# Patient Record
Sex: FEMALE | Race: WHITE | Employment: FULL TIME | URBAN - METROPOLITAN AREA
[De-identification: names, ages, dates, MRNs, and addresses within clinical notes are randomized per-mention and may not be internally consistent; named-entity substitution may affect disease eponyms.]

---

## 2017-01-23 ENCOUNTER — NURSE ONLY (OUTPATIENT)
Dept: FAMILY MEDICINE CLINIC | Facility: CLINIC | Age: 47
End: 2017-01-23

## 2017-01-23 PROCEDURE — 96372 THER/PROPH/DIAG INJ SC/IM: CPT | Performed by: FAMILY MEDICINE

## 2017-01-23 RX ORDER — CYANOCOBALAMIN 1000 UG/ML
1000 INJECTION INTRAMUSCULAR; SUBCUTANEOUS ONCE
Status: COMPLETED | OUTPATIENT
Start: 2017-01-23 | End: 2017-01-23

## 2017-01-23 RX ADMIN — CYANOCOBALAMIN 1000 MCG: 1000 INJECTION INTRAMUSCULAR; SUBCUTANEOUS at 17:35:00

## 2017-02-20 ENCOUNTER — NURSE ONLY (OUTPATIENT)
Dept: FAMILY MEDICINE CLINIC | Facility: CLINIC | Age: 47
End: 2017-02-20

## 2017-02-20 PROCEDURE — 96372 THER/PROPH/DIAG INJ SC/IM: CPT | Performed by: FAMILY MEDICINE

## 2017-02-20 RX ORDER — CYANOCOBALAMIN 1000 UG/ML
1000 INJECTION INTRAMUSCULAR; SUBCUTANEOUS ONCE
Status: COMPLETED | OUTPATIENT
Start: 2017-02-20 | End: 2017-02-20

## 2017-02-20 RX ADMIN — CYANOCOBALAMIN 1000 MCG: 1000 INJECTION INTRAMUSCULAR; SUBCUTANEOUS at 09:12:00

## 2017-03-01 ENCOUNTER — PRIOR ORIGINAL RECORDS (OUTPATIENT)
Dept: OTHER | Age: 47
End: 2017-03-01

## 2017-03-20 ENCOUNTER — NURSE ONLY (OUTPATIENT)
Dept: FAMILY MEDICINE CLINIC | Facility: CLINIC | Age: 47
End: 2017-03-20

## 2017-03-20 PROCEDURE — 96372 THER/PROPH/DIAG INJ SC/IM: CPT | Performed by: FAMILY MEDICINE

## 2017-03-20 RX ORDER — CYANOCOBALAMIN 1000 UG/ML
1000 INJECTION INTRAMUSCULAR; SUBCUTANEOUS ONCE
Status: COMPLETED | OUTPATIENT
Start: 2017-03-20 | End: 2017-03-20

## 2017-03-20 RX ADMIN — CYANOCOBALAMIN 1000 MCG: 1000 INJECTION INTRAMUSCULAR; SUBCUTANEOUS at 16:10:00

## 2017-04-04 RX ORDER — ZONISAMIDE 100 MG/1
100 CAPSULE ORAL DAILY
Qty: 90 CAPSULE | Refills: 0 | Status: SHIPPED | OUTPATIENT
Start: 2017-04-04 | End: 2017-09-12

## 2017-04-04 NOTE — TELEPHONE ENCOUNTER
From: Lydia Knight  To: Lars Sever, DO  Sent: 4/3/2017 6:40 PM CDT  Subject: Medication Renewal Request    Original authorizing provider: DO Lydia Ramirez would like a refill of the following medications:  zonisamide (Yanely Capone)

## 2017-06-15 ENCOUNTER — APPOINTMENT (OUTPATIENT)
Dept: ULTRASOUND IMAGING | Facility: HOSPITAL | Age: 47
End: 2017-06-15
Attending: EMERGENCY MEDICINE
Payer: COMMERCIAL

## 2017-06-15 ENCOUNTER — TELEPHONE (OUTPATIENT)
Dept: FAMILY MEDICINE CLINIC | Facility: CLINIC | Age: 47
End: 2017-06-15

## 2017-06-15 ENCOUNTER — HOSPITAL ENCOUNTER (EMERGENCY)
Facility: HOSPITAL | Age: 47
Discharge: HOME | End: 2017-06-15
Attending: EMERGENCY MEDICINE
Payer: COMMERCIAL

## 2017-06-15 ENCOUNTER — APPOINTMENT (OUTPATIENT)
Dept: CT IMAGING | Facility: HOSPITAL | Age: 47
End: 2017-06-15
Attending: EMERGENCY MEDICINE
Payer: COMMERCIAL

## 2017-06-15 VITALS
OXYGEN SATURATION: 100 % | HEART RATE: 60 BPM | WEIGHT: 140 LBS | SYSTOLIC BLOOD PRESSURE: 98 MMHG | DIASTOLIC BLOOD PRESSURE: 62 MMHG | RESPIRATION RATE: 18 BRPM | TEMPERATURE: 99 F | HEIGHT: 67 IN | BODY MASS INDEX: 21.97 KG/M2

## 2017-06-15 DIAGNOSIS — R09.1 PLEURISY: Primary | ICD-10-CM

## 2017-06-15 PROCEDURE — 85610 PROTHROMBIN TIME: CPT | Performed by: EMERGENCY MEDICINE

## 2017-06-15 PROCEDURE — 36415 COLL VENOUS BLD VENIPUNCTURE: CPT

## 2017-06-15 PROCEDURE — 93971 EXTREMITY STUDY: CPT | Performed by: EMERGENCY MEDICINE

## 2017-06-15 PROCEDURE — 99285 EMERGENCY DEPT VISIT HI MDM: CPT

## 2017-06-15 PROCEDURE — 71275 CT ANGIOGRAPHY CHEST: CPT | Performed by: EMERGENCY MEDICINE

## 2017-06-15 PROCEDURE — 80053 COMPREHEN METABOLIC PANEL: CPT | Performed by: EMERGENCY MEDICINE

## 2017-06-15 PROCEDURE — 85730 THROMBOPLASTIN TIME PARTIAL: CPT | Performed by: EMERGENCY MEDICINE

## 2017-06-15 PROCEDURE — 93010 ELECTROCARDIOGRAM REPORT: CPT

## 2017-06-15 PROCEDURE — 93005 ELECTROCARDIOGRAM TRACING: CPT

## 2017-06-15 PROCEDURE — 85025 COMPLETE CBC W/AUTO DIFF WBC: CPT | Performed by: EMERGENCY MEDICINE

## 2017-06-15 PROCEDURE — 84484 ASSAY OF TROPONIN QUANT: CPT | Performed by: EMERGENCY MEDICINE

## 2017-06-15 NOTE — ED PROVIDER NOTES
Patient Seen in: [facility] Emergency Department    History   Patient presents with:  Dyspnea JASBIR SOB (respiratory)    Stated Complaint: SOB, back pain, hx of PE    HPI    Patient is a 26-year-old female who presents emergency room with a history of situ    • Dissection of coronary artery            Past Surgical History    OTHER SURGICAL HISTORY  8/2005, 1/2009    Comment stents, ICD, mini vision     OTHER SURGICAL HISTORY  8/2005    Comment ICD implantation    ANGIOPLASTY (CORONARY)  2005    Comment Exam       ED Triage Vitals   BP 06/15/17 1022 115/69 mmHg   Pulse 06/15/17 1022 65   Resp 06/15/17 1022 18   Temp 06/15/17 1022 98.6 °F (37 °C)   Temp src 06/15/17 1022 Temporal   SpO2 06/15/17 1022 100 %   O2 Device 06/15/17 1130 None (Room air)       Cu Narrative: The aPTT Heparin Therapeutic Range is approximately 65- 104 seconds. The therapeutic range has been validated against 0.3-0.7 heparin anti-Xa units/mL.      PROTHROMBIN TIME (PT) - Abnormal; Notable for the following:     PT 16.6 (*)     INR stay.  The patient is felt better on repeat examination. Patient is eager to go home at this time states that she was only really concerned about having a blood clot.   Patient does have a history of a dissection of her coronary artery back in 2005 but sta

## 2017-06-15 NOTE — TELEPHONE ENCOUNTER
Pt states she says left back pain, sob, no numbness/tingling. Woke up fine, showered, ate, felt fine, then all of a sudden had left side/back pain. HR fine, no fever, no blurry vision. Pt is SOB, unable to take deep breath  Hx of carotid dissection.

## 2017-06-22 ENCOUNTER — OFFICE VISIT (OUTPATIENT)
Dept: FAMILY MEDICINE CLINIC | Facility: CLINIC | Age: 47
End: 2017-06-22

## 2017-06-22 VITALS
TEMPERATURE: 98 F | BODY MASS INDEX: 23.7 KG/M2 | HEART RATE: 99 BPM | WEIGHT: 151 LBS | RESPIRATION RATE: 20 BRPM | SYSTOLIC BLOOD PRESSURE: 104 MMHG | DIASTOLIC BLOOD PRESSURE: 60 MMHG | OXYGEN SATURATION: 98 % | HEIGHT: 67 IN

## 2017-06-22 DIAGNOSIS — I77.3 FIBROMUSCULAR DYSPLASIA (HCC): ICD-10-CM

## 2017-06-22 DIAGNOSIS — D35.02 ADRENAL ADENOMA, LEFT: Primary | ICD-10-CM

## 2017-06-22 DIAGNOSIS — E53.8 B12 DEFICIENCY: ICD-10-CM

## 2017-06-22 PROBLEM — D35.00 ADRENAL ADENOMA: Status: ACTIVE | Noted: 2017-06-22

## 2017-06-22 PROCEDURE — 99213 OFFICE O/P EST LOW 20 MIN: CPT | Performed by: FAMILY MEDICINE

## 2017-06-22 PROCEDURE — 96372 THER/PROPH/DIAG INJ SC/IM: CPT | Performed by: FAMILY MEDICINE

## 2017-06-22 RX ORDER — CYANOCOBALAMIN 1000 UG/ML
1000 INJECTION INTRAMUSCULAR; SUBCUTANEOUS ONCE
Status: COMPLETED | OUTPATIENT
Start: 2017-06-22 | End: 2017-06-22

## 2017-06-22 RX ADMIN — CYANOCOBALAMIN 1000 MCG: 1000 INJECTION INTRAMUSCULAR; SUBCUTANEOUS at 14:02:00

## 2017-06-22 NOTE — PROGRESS NOTES
HPI:   Jose Armando Garcia is a 49year old female here with h/o  HA's, fibromuscular dysplasia with h/o dissections here with sob and left thoracic pain     Pt went to ER  CT ok except ?  New adrenal adenoma ; pt reports she will examine her [facility] Resolved. • Thyromegaly    • Secondary hypercoagulability disorder (HCC)      Protein S deficiency, W/heterzygous MTHFR mutation.     • Spastic colon    • Tricuspid valve disorder    • Edema    • Hyperlipidemia    • Automatic implantable cardiac defibril apparent distress  MUSCULOSKELETAL: back is not tender,FROM of the back  HEART: normal   LUNGS: clear  EXTREMITIES: no cyanosis, clubbing or shimon  NEURO: cranial nerves are intact,motor and sensory are intact    ASSESSMENT AND PLAN:   [place] Niece is

## 2017-07-16 NOTE — PROGRESS NOTES
HPI:   Shannon Roberts is a 49year old female here with neck pain and paresthesia     Neck pain started abruptly while pushing an empty toy bin.    Pt felt very sharp pain   Pt felt like she could not move    Pt could not sleep   Pt finally was able to g Hyperlipidemia    • Migraines    • Other pulmonary embolism and infarction     Postpartum, Aug 2005. • Pulmonary embolism (HCC)    • Secondary hypercoagulability disorder (HCC)     Protein S deficiency, W/heterzygous MTHFR mutation.     • Spastic colon thyroid history  ALL/ASTHMA: denies asthma    EXAM:   /80   Pulse 60   Temp 98.8 °F (37.1 °C) (Oral)   Resp 22   Ht 67\"   Wt 150 lb   SpO2 99%   BMI 23.49 kg/m²   Body mass index is 23.49 kg/m².    GENERAL: alert and oriented X 3, well developed, wel

## 2017-07-17 ENCOUNTER — HOSPITAL ENCOUNTER (OUTPATIENT)
Dept: GENERAL RADIOLOGY | Age: 47
Discharge: HOME | End: 2017-07-17
Attending: FAMILY MEDICINE
Payer: COMMERCIAL

## 2017-07-17 ENCOUNTER — OFFICE VISIT (OUTPATIENT)
Dept: FAMILY MEDICINE CLINIC | Facility: CLINIC | Age: 47
End: 2017-07-17

## 2017-07-17 VITALS
OXYGEN SATURATION: 99 % | HEART RATE: 60 BPM | HEIGHT: 67 IN | WEIGHT: 150 LBS | BODY MASS INDEX: 23.54 KG/M2 | SYSTOLIC BLOOD PRESSURE: 126 MMHG | TEMPERATURE: 99 F | DIASTOLIC BLOOD PRESSURE: 80 MMHG | RESPIRATION RATE: 22 BRPM

## 2017-07-17 DIAGNOSIS — M54.16 LUMBAR BACK PAIN WITH RADICULOPATHY AFFECTING RIGHT LOWER EXTREMITY: Primary | ICD-10-CM

## 2017-07-17 DIAGNOSIS — M54.12 CERVICAL RADICULOPATHY: ICD-10-CM

## 2017-07-17 DIAGNOSIS — M54.16 LUMBAR BACK PAIN WITH RADICULOPATHY AFFECTING RIGHT LOWER EXTREMITY: ICD-10-CM

## 2017-07-17 PROCEDURE — 72110 X-RAY EXAM L-2 SPINE 4/>VWS: CPT | Performed by: FAMILY MEDICINE

## 2017-07-17 PROCEDURE — 99214 OFFICE O/P EST MOD 30 MIN: CPT | Performed by: FAMILY MEDICINE

## 2017-07-17 PROCEDURE — 72050 X-RAY EXAM NECK SPINE 4/5VWS: CPT | Performed by: FAMILY MEDICINE

## 2017-07-17 RX ORDER — TRAMADOL HYDROCHLORIDE 50 MG/1
TABLET ORAL
Refills: 0 | COMMUNITY
Start: 2017-07-15 | End: 2018-07-25

## 2017-07-17 RX ORDER — PREDNISONE 20 MG/1
40 TABLET ORAL DAILY
Qty: 8 TABLET | Refills: 0 | Status: SHIPPED | OUTPATIENT
Start: 2017-07-17 | End: 2017-07-21

## 2017-07-17 RX ORDER — CYCLOBENZAPRINE HCL 5 MG
5 TABLET ORAL 3 TIMES DAILY PRN
Qty: 60 TABLET | Refills: 0 | Status: SHIPPED | OUTPATIENT
Start: 2017-07-17 | End: 2017-10-13

## 2017-07-25 ENCOUNTER — TELEPHONE (OUTPATIENT)
Dept: SURGERY | Facility: CLINIC | Age: 47
End: 2017-07-25

## 2017-07-25 ENCOUNTER — OFFICE VISIT (OUTPATIENT)
Dept: FAMILY MEDICINE CLINIC | Facility: CLINIC | Age: 47
End: 2017-07-25

## 2017-07-25 VITALS
WEIGHT: 153 LBS | RESPIRATION RATE: 20 BRPM | BODY MASS INDEX: 24.01 KG/M2 | TEMPERATURE: 99 F | HEART RATE: 72 BPM | SYSTOLIC BLOOD PRESSURE: 124 MMHG | HEIGHT: 67 IN | DIASTOLIC BLOOD PRESSURE: 80 MMHG

## 2017-07-25 DIAGNOSIS — M54.16 LUMBAR BACK PAIN WITH RADICULOPATHY AFFECTING RIGHT LOWER EXTREMITY: ICD-10-CM

## 2017-07-25 DIAGNOSIS — M54.12 CERVICAL RADICULOPATHY: ICD-10-CM

## 2017-07-25 PROCEDURE — 99213 OFFICE O/P EST LOW 20 MIN: CPT | Performed by: FAMILY MEDICINE

## 2017-07-25 NOTE — PROGRESS NOTES
HPI:   Behzad Wood is a 49year old female here with neck pain and paresthesia     Overall pain is unchanged.   Pt only tool the flexeril 3 x ; she did sleep better   Pt is taking the fiorcet   Discussed rebound HAs  Discussed the x-ray findings catheter introduction in R coronary artery, Jan 2007. • Ventricular fibrillation ([address]     Arrest, 2 weeks post delivery of 4th child, Aug 2005.        Past Surgical History:  2005: ANGIOPLASTY (CORONARY)      Comment:  2.25x x 18mm Vision x 2 stents in edema  NEURO: cranial nerves are intact,motor and sensory are intact  BACK : + lumbar tenderness   + cervical tenderness  UE/ LE: 5+ strength 2+ DTR's normal sensation     ASSESSMENT AND PLAN:   Kalpana Henry is a 49year old female here with     1Tam Crockett Precise

## 2017-07-31 ENCOUNTER — TELEPHONE (OUTPATIENT)
Dept: NEUROLOGY | Facility: CLINIC | Age: 47
End: 2017-07-31

## 2017-07-31 NOTE — TELEPHONE ENCOUNTER
Was having neck pain numb right cheek and right leg  Neck pain base of neck.   xrays and sent to [facility] but then go to [address] [place] [address] Merrill Holguin MD  Vascular & General Neurology  Director, Multiple Sclerosis Program  [facility]  7/31/

## 2017-08-01 ENCOUNTER — OFFICE VISIT (OUTPATIENT)
Dept: NEUROLOGY | Facility: CLINIC | Age: 47
End: 2017-08-01

## 2017-08-01 VITALS — DIASTOLIC BLOOD PRESSURE: 62 MMHG | HEART RATE: 72 BPM | SYSTOLIC BLOOD PRESSURE: 100 MMHG | RESPIRATION RATE: 18 BRPM

## 2017-08-01 DIAGNOSIS — R29.898 RIGHT ARM WEAKNESS: ICD-10-CM

## 2017-08-01 DIAGNOSIS — I77.6 VASCULITIS (HCC): Primary | ICD-10-CM

## 2017-08-01 DIAGNOSIS — I77.71 CAROTID ARTERY DISSECTION (HCC): ICD-10-CM

## 2017-08-01 DIAGNOSIS — I25.42 DISSECTION OF CORONARY ARTERY: ICD-10-CM

## 2017-08-01 DIAGNOSIS — M54.2 NECK PAIN: ICD-10-CM

## 2017-08-01 PROCEDURE — 99214 OFFICE O/P EST MOD 30 MIN: CPT | Performed by: OTHER

## 2017-08-01 NOTE — PATIENT INSTRUCTIONS
Refill policies:    • Allow 2-3 business days for refills; controlled substances may take longer.   • Contact your pharmacy at least 5 days prior to running out of medication and have them send an electronic request or submit request through the [facility] have a procedure or additional testing performed. [employer] [facility]) will contact your insurance carrier to obtain pre-certification or prior authorization.     Unfortunately, [employer] has seen an increase in denial of payment even though the p

## 2017-08-01 NOTE — PROGRESS NOTES
[address] [place] [address] with [facility]  8/1/2017    12:43 PM    Cc:  Right neck pain and arm weakness    HPI:   2 weeks ago, she just casually pushed a toy box with her right arm and at that moment, instantly felt Location: Left arm)   Pulse 72   Resp 18   HENT:  Pink conjunctiva, anicteric sclerae, moist mucosa  Neck: No adenopathy or thyromegaly  Heart S1S2, no murmur  Lungs are clear, no wheezing  Extremities: no cyanosis or edema    NEURO  Alert with fluent spee

## 2017-08-16 ENCOUNTER — HOSPITAL ENCOUNTER (OUTPATIENT)
Dept: CT IMAGING | Facility: HOSPITAL | Age: 47
Discharge: HOME | End: 2017-08-16
Attending: Other
Payer: COMMERCIAL

## 2017-08-16 DIAGNOSIS — I25.42 DISSECTION OF CORONARY ARTERY: ICD-10-CM

## 2017-08-16 DIAGNOSIS — R29.898 RIGHT ARM WEAKNESS: ICD-10-CM

## 2017-08-16 DIAGNOSIS — I77.71 CAROTID ARTERY DISSECTION (HCC): ICD-10-CM

## 2017-08-16 DIAGNOSIS — I77.6 VASCULITIS (HCC): ICD-10-CM

## 2017-08-16 DIAGNOSIS — M54.2 NECK PAIN: ICD-10-CM

## 2017-08-16 PROCEDURE — 70498 CT ANGIOGRAPHY NECK: CPT | Performed by: OTHER

## 2017-08-16 PROCEDURE — 70496 CT ANGIOGRAPHY HEAD: CPT | Performed by: OTHER

## 2017-08-16 PROCEDURE — 72125 CT NECK SPINE W/O DYE: CPT | Performed by: OTHER

## 2017-08-22 ENCOUNTER — TELEPHONE (OUTPATIENT)
Dept: NEUROLOGY | Facility: CLINIC | Age: 47
End: 2017-08-22

## 2017-08-22 NOTE — TELEPHONE ENCOUNTER
Spoke with patient, no evidence of dissection to explain the sudden severe pain in head and neck    Stay on Silver Crespo MD  Vascular & General Neurology  Director, Multiple Sclerosis Program  [facility]  8/22/2017, Time c

## 2017-09-01 ENCOUNTER — OFFICE VISIT (OUTPATIENT)
Dept: NEUROLOGY | Facility: CLINIC | Age: 47
End: 2017-09-01

## 2017-09-01 VITALS
BODY MASS INDEX: 24.01 KG/M2 | DIASTOLIC BLOOD PRESSURE: 62 MMHG | HEART RATE: 68 BPM | SYSTOLIC BLOOD PRESSURE: 124 MMHG | RESPIRATION RATE: 16 BRPM | WEIGHT: 153 LBS | HEIGHT: 67 IN

## 2017-09-01 DIAGNOSIS — G44.031 INTRACTABLE EPISODIC PAROXYSMAL HEMICRANIA: ICD-10-CM

## 2017-09-01 DIAGNOSIS — I25.42 DISSECTION OF CORONARY ARTERY: Primary | ICD-10-CM

## 2017-09-01 DIAGNOSIS — M54.2 NECK PAIN: ICD-10-CM

## 2017-09-01 DIAGNOSIS — I77.71 CAROTID ARTERY DISSECTION (HCC): ICD-10-CM

## 2017-09-01 PROCEDURE — 99214 OFFICE O/P EST MOD 30 MIN: CPT | Performed by: OTHER

## 2017-09-01 NOTE — PATIENT INSTRUCTIONS
Refill policies:    • Allow 2-3 business days for refills; controlled substances may take longer.   • Contact your pharmacy at least 5 days prior to running out of medication and have them send an electronic request or submit request through the [facility] have a procedure or additional testing performed. [facility]) will contact your insurance carrier to obtain pre-certification or prior authorization.     Unfortunately, [employer] has seen an increase in denial of payment even though the p

## 2017-09-01 NOTE — PROGRESS NOTES
[facility] with [address] [facility]  2/17/1970  Primary Care Provider:  Dana Shi DO    9/1/2017    49year old yo patient being seen for:  History of carotid dissection    The head sclerae, moist mucosa  No LAD, no thyromegaly  Lungs clear breath sounds  Heart S1S2, no abnormal sounds  Pink nailbeds no Cyanosis, pulses palpated    Neuro: Alert oriented with normal CN 2-12. No motor and sensory deficits.  DTRs were symmetric and no upp

## 2017-09-07 ENCOUNTER — PRIOR ORIGINAL RECORDS (OUTPATIENT)
Dept: OTHER | Age: 47
End: 2017-09-07

## 2017-09-07 ENCOUNTER — MYAURORA ACCOUNT LINK (OUTPATIENT)
Dept: OTHER | Age: 47
End: 2017-09-07

## 2017-09-12 RX ORDER — ZONISAMIDE 100 MG/1
100 CAPSULE ORAL DAILY
Qty: 30 CAPSULE | Refills: 0 | Status: SHIPPED | OUTPATIENT
Start: 2017-09-12 | End: 2018-04-10

## 2017-09-22 DIAGNOSIS — G43.011 INTRACTABLE MIGRAINE WITHOUT AURA AND WITH STATUS MIGRAINOSUS: Primary | ICD-10-CM

## 2017-09-22 RX ORDER — ZONISAMIDE 100 MG/1
100 CAPSULE ORAL DAILY
Qty: 90 CAPSULE | Refills: 1 | Status: SHIPPED | OUTPATIENT
Start: 2017-09-22 | End: 2017-10-13

## 2017-09-22 NOTE — TELEPHONE ENCOUNTER
Medication: zonegran    Date of last refill: 9/12/17 (from Dr. Jose Brown)  [place]    Last office visit: 8/1/17  Due back to clinic per last office note:  Not addressed  Date next office visit scheduled:  No future appo

## 2017-10-13 ENCOUNTER — OFFICE VISIT (OUTPATIENT)
Dept: FAMILY MEDICINE CLINIC | Facility: CLINIC | Age: 47
End: 2017-10-13

## 2017-10-13 ENCOUNTER — HOSPITAL ENCOUNTER (OUTPATIENT)
Dept: ULTRASOUND IMAGING | Age: 47
Discharge: HOME | End: 2017-10-13
Attending: FAMILY MEDICINE
Payer: COMMERCIAL

## 2017-10-13 VITALS
WEIGHT: 150 LBS | RESPIRATION RATE: 18 BRPM | HEART RATE: 74 BPM | DIASTOLIC BLOOD PRESSURE: 68 MMHG | BODY MASS INDEX: 24.11 KG/M2 | TEMPERATURE: 99 F | HEIGHT: 66 IN | SYSTOLIC BLOOD PRESSURE: 100 MMHG

## 2017-10-13 DIAGNOSIS — M79.604 RIGHT LEG PAIN: Primary | ICD-10-CM

## 2017-10-13 DIAGNOSIS — E53.8 VITAMIN B12 DEFICIENCY: ICD-10-CM

## 2017-10-13 DIAGNOSIS — I77.3 FIBROMUSCULAR DYSPLASIA (HCC): ICD-10-CM

## 2017-10-13 DIAGNOSIS — Z15.89 MTHFR MUTATION: ICD-10-CM

## 2017-10-13 DIAGNOSIS — M79.604 RIGHT LEG PAIN: ICD-10-CM

## 2017-10-13 PROCEDURE — 90471 IMMUNIZATION ADMIN: CPT | Performed by: FAMILY MEDICINE

## 2017-10-13 PROCEDURE — 93971 EXTREMITY STUDY: CPT | Performed by: FAMILY MEDICINE

## 2017-10-13 PROCEDURE — 90686 IIV4 VACC NO PRSV 0.5 ML IM: CPT | Performed by: FAMILY MEDICINE

## 2017-10-13 PROCEDURE — 96372 THER/PROPH/DIAG INJ SC/IM: CPT | Performed by: FAMILY MEDICINE

## 2017-10-13 PROCEDURE — 99213 OFFICE O/P EST LOW 20 MIN: CPT | Performed by: FAMILY MEDICINE

## 2017-10-13 RX ORDER — CYANOCOBALAMIN 1000 UG/ML
1000 INJECTION INTRAMUSCULAR; SUBCUTANEOUS ONCE
Status: COMPLETED | OUTPATIENT
Start: 2017-10-13 | End: 2017-10-13

## 2017-10-13 RX ADMIN — CYANOCOBALAMIN 1000 MCG: 1000 INJECTION INTRAMUSCULAR; SUBCUTANEOUS at 09:41:00

## 2017-10-13 NOTE — PROGRESS NOTES
HPI:   Tierra Cole is a 49year old female here with leg pain. Right leg pain ; started 3 weeks ago ; not getting any better.    It is painful when first standing up and then then pt will limp and then it will get better    Pt has pain after sittin Ventricular fibrillation ([facility]     Arrest, 2 weeks post delivery of 4th child, Aug 2005.        Past Surgical History:  2005: ANGIOPLASTY (CORONARY)      Comment:  2.25x x 18mm Vision x 2 stents in LADm   2007: ANGIOPLASTY (CORONARY)      Comment: 2.5 x 8 m nerves are intact,motor and sensory are intact  BACK : + pain of right hip flexor and right calf  No calf warmth or redness or warmth   UE/ LE: 5+ strength 2+ DTR's normal sensation     ASSESSMENT AND PLAN:   Sergio Lambert is a 49year old female here

## 2017-10-23 ENCOUNTER — TELEPHONE (OUTPATIENT)
Dept: FAMILY MEDICINE CLINIC | Facility: CLINIC | Age: 47
End: 2017-10-23

## 2017-10-23 DIAGNOSIS — M79.604 PAIN OF RIGHT LOWER EXTREMITY: Primary | ICD-10-CM

## 2017-10-23 NOTE — TELEPHONE ENCOUNTER
Pt saw Izzy Lewis for  R leg pain. States she feels like there is more pain. Hurts to stand up. Pls call back to advise.

## 2017-10-23 NOTE — TELEPHONE ENCOUNTER
Pt states right leg still with pain, same as when she was seen, however it seems to be lasting longer. worse when she sits for a long period of time, when she gets up from sitting she has to limp for a while. No swelling, no reddness.   Please advise on ne

## 2017-10-31 ENCOUNTER — HOSPITAL ENCOUNTER (OUTPATIENT)
Dept: PHYSICAL THERAPY | Facility: HOSPITAL | Age: 47
Setting detail: THERAPIES SERIES
Discharge: HOME | End: 2017-10-31
Attending: FAMILY MEDICINE
Payer: COMMERCIAL

## 2017-10-31 ENCOUNTER — APPOINTMENT (OUTPATIENT)
Dept: PHYSICAL THERAPY | Facility: HOSPITAL | Age: 47
End: 2017-10-31
Attending: FAMILY MEDICINE
Payer: COMMERCIAL

## 2017-10-31 DIAGNOSIS — M79.604 PAIN OF RIGHT LOWER EXTREMITY: ICD-10-CM

## 2017-10-31 PROCEDURE — 97110 THERAPEUTIC EXERCISES: CPT

## 2017-10-31 PROCEDURE — 97162 PT EVAL MOD COMPLEX 30 MIN: CPT

## 2017-11-02 ENCOUNTER — HOSPITAL ENCOUNTER (OUTPATIENT)
Dept: PHYSICAL THERAPY | Facility: HOSPITAL | Age: 47
Setting detail: THERAPIES SERIES
Discharge: HOME | End: 2017-11-02
Attending: FAMILY MEDICINE
Payer: COMMERCIAL

## 2017-11-02 DIAGNOSIS — M79.604 PAIN OF RIGHT LOWER EXTREMITY: ICD-10-CM

## 2017-11-02 PROCEDURE — 97110 THERAPEUTIC EXERCISES: CPT

## 2017-11-02 PROCEDURE — 97140 MANUAL THERAPY 1/> REGIONS: CPT

## 2017-11-02 NOTE — PROGRESS NOTES
Dx: Pain of right lower extremity (M79.604)            Authorized # of Visits:  NA (10 per POC)         Next MD visit: none scheduled  Fall Risk: standard         Precautions: n/a             Subjective: R knee pain is the same with sit to stand and walkin proper posture and body mechanics to decrease pain and improve spinal safety (10 visits)  · Pt will demonstrate improved lumbar and thoracic spine segmental mobility to facilitate decreased difficulty with sit-to-stand and gait initiation (10 visits)  · Pt

## 2017-11-02 NOTE — PROGRESS NOTES
SPINE EVALUATION:   Referring Physician: Dr. Crystal Herzog  Diagnosis:  Pain of right lower extremity (M79.604)     Date of Service: 10/31/2017     PATIENT SUMMARY   Candy Peres is a 49year old y/o female who presents to therapy today with complaints o that. Pt goals include decreased pain. Past medical history was reviewed with Caresse Hodgkin.  Significant findings include fibromuscular dysplasia, chest pain/angina, blood clots, thyroid problems    ASSESSMENT  Liviersse Hodgkin is a 50 yo female who presents to physic myotome testing.      Flexibility: some limitations in R hip flexors; moderate limitations in R hamstrings with adverse neural tension; moderate limitations R piriformis     Special tests:   SIJ testing: Gaenslen's (-) B; SIJ compression and distraction (-) visits)      Frequency / Duration: Patient will be seen for 2 x/week or a total of 10 visits over a 90 day period. Treatment will include: Manual Therapy; Therapeutic Exercises; Neuromuscular Re-education; Therapeutic Activity;  Electrical Stim; Mechanical

## 2017-11-07 ENCOUNTER — HOSPITAL ENCOUNTER (OUTPATIENT)
Dept: PHYSICAL THERAPY | Facility: HOSPITAL | Age: 47
Setting detail: THERAPIES SERIES
End: 2017-11-07
Attending: FAMILY MEDICINE
Payer: COMMERCIAL

## 2017-11-07 DIAGNOSIS — M79.604 PAIN OF RIGHT LOWER EXTREMITY: ICD-10-CM

## 2017-11-07 PROCEDURE — 97110 THERAPEUTIC EXERCISES: CPT

## 2017-11-07 PROCEDURE — 97140 MANUAL THERAPY 1/> REGIONS: CPT

## 2017-11-07 NOTE — PROGRESS NOTES
Dx: Pain of right lower extremity (M79.604)            Authorized # of Visits:  NA (10 per POC)         Next MD visit: none scheduled  Fall Risk: standard         Precautions: n/a             Subjective: She feels that R knee pain is less frequent and is l TX#: 7/ Date:               TX#: 8/   R knee evaluation Stationary bike L2 4'        R hip flexors and quadriceps STM R hip flexors and quadriceps STM        R hip flexors stretch in modified Dann position R hip flexors stretch in modified Hannah Guevara posit

## 2017-11-09 ENCOUNTER — HOSPITAL ENCOUNTER (OUTPATIENT)
Dept: PHYSICAL THERAPY | Facility: HOSPITAL | Age: 47
Setting detail: THERAPIES SERIES
Discharge: HOME | End: 2017-11-09
Attending: FAMILY MEDICINE
Payer: COMMERCIAL

## 2017-11-09 DIAGNOSIS — M79.604 PAIN OF RIGHT LOWER EXTREMITY: ICD-10-CM

## 2017-11-09 PROCEDURE — 97110 THERAPEUTIC EXERCISES: CPT

## 2017-11-09 PROCEDURE — 97140 MANUAL THERAPY 1/> REGIONS: CPT

## 2017-11-09 NOTE — PROGRESS NOTES
Dx: Pain of right lower extremity (M79.604)            Authorized # of Visits:  NA (10 per POC)         Next MD visit: none scheduled  Fall Risk: standard         Precautions: n/a             Subjective: R knee is sore because she had to do a lot of walkin Date:               TX#: 5/ Date:               TX#: 6/ Date:               TX#: 7/ Date:               TX#: 8/   R knee evaluation Stationary bike L2 4' Stationary bike L2 4'       R hip flexors and quadriceps STM R hip flexors and quadriceps STM R hip fl

## 2017-11-13 ENCOUNTER — TELEPHONE (OUTPATIENT)
Dept: FAMILY MEDICINE CLINIC | Facility: CLINIC | Age: 47
End: 2017-11-13

## 2017-11-13 NOTE — TELEPHONE ENCOUNTER
Pt called to request a call back from the nurse, pt wants to discuss her px therapy and wants to ask if she is due to coming for anything. Please call pt and advise.

## 2017-11-14 ENCOUNTER — APPOINTMENT (OUTPATIENT)
Dept: PHYSICAL THERAPY | Facility: HOSPITAL | Age: 47
End: 2017-11-14
Attending: FAMILY MEDICINE
Payer: COMMERCIAL

## 2017-11-15 ENCOUNTER — OFFICE VISIT (OUTPATIENT)
Dept: FAMILY MEDICINE CLINIC | Facility: CLINIC | Age: 47
End: 2017-11-15

## 2017-11-15 VITALS
TEMPERATURE: 99 F | HEART RATE: 66 BPM | BODY MASS INDEX: 24.43 KG/M2 | DIASTOLIC BLOOD PRESSURE: 76 MMHG | HEIGHT: 66 IN | RESPIRATION RATE: 16 BRPM | SYSTOLIC BLOOD PRESSURE: 120 MMHG | WEIGHT: 152 LBS

## 2017-11-15 DIAGNOSIS — M23.91 ACUTE INTERNAL DERANGEMENT OF RIGHT KNEE: Primary | ICD-10-CM

## 2017-11-15 PROCEDURE — 99213 OFFICE O/P EST LOW 20 MIN: CPT | Performed by: FAMILY MEDICINE

## 2017-11-15 NOTE — PROGRESS NOTES
HPI:   Nataliya Muro is a 49year old female here with right knee pain     It started 7 weeks ago   Pt reports it started without any injury   U/s neg for DVT  Pt will have aching after sitting for while   Pt when using stairs and leading with the leg introduction in R coronary artery, Jan 2007. • Ventricular fibrillation ([address]     Arrest, 2 weeks post delivery of 4th child, Aug 2005.        Past Surgical History:  2005: ANGIOPLASTY (CORONARY)      Comment:  2.25x x 18mm Vision x 2 stents in LADm   20 clear  EXTREMITIES: no cyanosis, clubbing or edema  NEURO: cranial nerves are intact,motor and sensory are intact  Left knee : normal   Right knee: + effusion + lachman's normal MCL and LCL   No calf warmth or redness or warmth   UE/ LE: 5+ strength 2+ DTR

## 2017-11-16 ENCOUNTER — APPOINTMENT (OUTPATIENT)
Dept: PHYSICAL THERAPY | Facility: HOSPITAL | Age: 47
End: 2017-11-16
Attending: FAMILY MEDICINE
Payer: COMMERCIAL

## 2017-12-28 ENCOUNTER — MYAURORA ACCOUNT LINK (OUTPATIENT)
Dept: OTHER | Age: 47
End: 2017-12-28

## 2018-03-07 ENCOUNTER — PRIOR ORIGINAL RECORDS (OUTPATIENT)
Dept: OTHER | Age: 48
End: 2018-03-07

## 2018-04-10 DIAGNOSIS — G89.29 CHRONIC INTRACTABLE HEADACHE, UNSPECIFIED HEADACHE TYPE: ICD-10-CM

## 2018-04-10 DIAGNOSIS — I77.6 VASCULITIS (HCC): Primary | ICD-10-CM

## 2018-04-10 DIAGNOSIS — R51.9 CHRONIC INTRACTABLE HEADACHE, UNSPECIFIED HEADACHE TYPE: ICD-10-CM

## 2018-04-10 DIAGNOSIS — I77.6 VASCULITIS (HCC): ICD-10-CM

## 2018-04-10 RX ORDER — TRAMADOL HYDROCHLORIDE 50 MG/1
50 TABLET ORAL EVERY 8 HOURS PRN
Qty: 40 TABLET | Refills: 0 | Status: SHIPPED
Start: 2018-04-10 | End: 2019-04-22

## 2018-04-10 RX ORDER — ZONISAMIDE 100 MG/1
100 CAPSULE ORAL DAILY
Qty: 30 CAPSULE | Refills: 0 | Status: SHIPPED | OUTPATIENT
Start: 2018-04-10 | End: 2018-04-10

## 2018-04-10 NOTE — TELEPHONE ENCOUNTER
Medication: Tramadol 50 mg& Zonasamide 100 mg  Date of last refill: 9/12/17 (from Dr. Ori Rae) & 07/15/17 by Latha Pratt  [place]     Last office visit: 9/1/17  Due back to clinic per last office note:  Not addressed

## 2018-04-11 RX ORDER — ZONISAMIDE 100 MG/1
CAPSULE ORAL
Qty: 90 CAPSULE | Refills: 0 | Status: SHIPPED | OUTPATIENT
Start: 2018-04-11 | End: 2018-10-22

## 2018-04-11 NOTE — TELEPHONE ENCOUNTER
Medication:  Zonasamide 100 mg  Date of last refill: 9/12/17 (from Dr. Cris Lou) & 07/15/17 by Maryuri May  [place]     Last office visit: 9/1/17  Due back to clinic per last office note:  Not addressed  Date next off

## 2018-07-12 ENCOUNTER — MYAURORA ACCOUNT LINK (OUTPATIENT)
Dept: OTHER | Age: 48
End: 2018-07-12

## 2018-09-14 ENCOUNTER — PRIOR ORIGINAL RECORDS (OUTPATIENT)
Dept: OTHER | Age: 48
End: 2018-09-14

## 2018-09-21 ENCOUNTER — OFFICE VISIT (OUTPATIENT)
Dept: FAMILY MEDICINE CLINIC | Facility: CLINIC | Age: 48
End: 2018-09-21
Payer: COMMERCIAL

## 2018-09-21 VITALS
DIASTOLIC BLOOD PRESSURE: 72 MMHG | SYSTOLIC BLOOD PRESSURE: 122 MMHG | TEMPERATURE: 98 F | WEIGHT: 155 LBS | HEIGHT: 66 IN | HEART RATE: 62 BPM | OXYGEN SATURATION: 97 % | RESPIRATION RATE: 16 BRPM | BODY MASS INDEX: 24.91 KG/M2

## 2018-09-21 DIAGNOSIS — J42 CHRONIC BRONCHITIS, UNSPECIFIED CHRONIC BRONCHITIS TYPE (HCC): Primary | ICD-10-CM

## 2018-09-21 PROCEDURE — 99213 OFFICE O/P EST LOW 20 MIN: CPT | Performed by: FAMILY MEDICINE

## 2018-09-21 RX ORDER — AZITHROMYCIN 250 MG/1
TABLET, FILM COATED ORAL
Qty: 6 TABLET | Refills: 0 | Status: SHIPPED | OUTPATIENT
Start: 2018-09-21 | End: 2018-10-22 | Stop reason: ALTCHOICE

## 2018-09-21 RX ORDER — PREDNISONE 20 MG/1
40 TABLET ORAL DAILY
Qty: 8 TABLET | Refills: 0 | Status: SHIPPED | OUTPATIENT
Start: 2018-09-21 | End: 2018-09-25

## 2018-09-21 NOTE — PROGRESS NOTES
Pt here with cold symptoms.     Started Saturday   Getting worse   OTC meds mucinex   +  cough and congestion  no sinus tendernss  No  ear pain  No  throat pain  ?  fever and chills  ++  sick contacts    ROS otherwise negative  Past medical, surgical and so

## 2018-10-03 ENCOUNTER — HOSPITAL ENCOUNTER (OUTPATIENT)
Dept: GENERAL RADIOLOGY | Age: 48
Discharge: HOME | End: 2018-10-03
Attending: FAMILY MEDICINE
Payer: COMMERCIAL

## 2018-10-03 ENCOUNTER — TELEPHONE (OUTPATIENT)
Dept: FAMILY MEDICINE CLINIC | Facility: CLINIC | Age: 48
End: 2018-10-03

## 2018-10-03 ENCOUNTER — TELEPHONE (OUTPATIENT)
Dept: NEUROLOGY | Facility: CLINIC | Age: 48
End: 2018-10-03

## 2018-10-03 DIAGNOSIS — R07.89 CHEST TIGHTNESS: Primary | ICD-10-CM

## 2018-10-03 DIAGNOSIS — R07.89 CHEST TIGHTNESS: ICD-10-CM

## 2018-10-03 PROCEDURE — 71046 X-RAY EXAM CHEST 2 VIEWS: CPT | Performed by: FAMILY MEDICINE

## 2018-10-03 NOTE — TELEPHONE ENCOUNTER
CXR ordered    Called patient - verified patient's name and  - informed pt of doctor's note - patient verbalized understanding

## 2018-10-03 NOTE — TELEPHONE ENCOUNTER
Called patient - verified patient's name and  - pt states yesterday she lost her voice, pt very hoarse on phone. Pt states this morning she woke up with some chest tightness but it went away and she went to work.  Pt states this morning at work, she deve

## 2018-10-04 ENCOUNTER — TELEPHONE (OUTPATIENT)
Dept: FAMILY MEDICINE CLINIC | Facility: CLINIC | Age: 48
End: 2018-10-04

## 2018-10-18 ENCOUNTER — MYAURORA ACCOUNT LINK (OUTPATIENT)
Dept: OTHER | Age: 48
End: 2018-10-18

## 2018-10-22 ENCOUNTER — OFFICE VISIT (OUTPATIENT)
Dept: NEUROLOGY | Facility: CLINIC | Age: 48
End: 2018-10-22
Payer: COMMERCIAL

## 2018-10-22 VITALS
SYSTOLIC BLOOD PRESSURE: 111 MMHG | HEART RATE: 63 BPM | WEIGHT: 155 LBS | DIASTOLIC BLOOD PRESSURE: 67 MMHG | HEIGHT: 66 IN | RESPIRATION RATE: 16 BRPM | BODY MASS INDEX: 24.91 KG/M2

## 2018-10-22 DIAGNOSIS — R51.9 CHRONIC INTRACTABLE HEADACHE, UNSPECIFIED HEADACHE TYPE: ICD-10-CM

## 2018-10-22 DIAGNOSIS — Z86.79 HISTORY OF CAROTID ARTERY DISSECTION: ICD-10-CM

## 2018-10-22 DIAGNOSIS — G89.29 CHRONIC INTRACTABLE HEADACHE, UNSPECIFIED HEADACHE TYPE: ICD-10-CM

## 2018-10-22 DIAGNOSIS — M54.2 NECK PAIN: Primary | ICD-10-CM

## 2018-10-22 DIAGNOSIS — I77.6 VASCULITIS (HCC): ICD-10-CM

## 2018-10-22 PROCEDURE — 99214 OFFICE O/P EST MOD 30 MIN: CPT | Performed by: PHYSICIAN ASSISTANT

## 2018-10-22 RX ORDER — ZONISAMIDE 100 MG/1
100 CAPSULE ORAL
Qty: 90 CAPSULE | Refills: 1 | Status: SHIPPED | OUTPATIENT
Start: 2018-10-22 | End: 2019-04-22

## 2018-10-22 NOTE — PROGRESS NOTES
[facility] with [address] [facility]  2/17/1970  Primary Care Provider:  Sarah Shannon DO    10/22/2018  Accompanied visit:  (X) No ( ) yes, by:      51year old female patient being see • Other pulmonary embolism and infarction     Postpartum, Aug 2005. • Pulmonary embolism (HCC)    • Secondary hypercoagulability disorder (HCC)     Protein S deficiency, W/heterzygous MTHFR mutation.     • Spastic colon    • Thyromegaly    • Tricuspid today's visit, the following items were reviewed: recent medical and surgical history and medications, and the following relevant information are as noted in appropriate sections above and below.      Problem/s Identified this visit:   Neck pain  (primary e

## 2018-10-22 NOTE — PATIENT INSTRUCTIONS
Refill policies:    • Allow 2-3 business days for refills; controlled substances may take longer.   • Contact your pharmacy at least 5 days prior to running out of medication and have them send an electronic request or submit request through the “request re entire amount billed. Precertification and Prior Authorizations: If your physician has recommended that you have a procedure or additional testing performed.   [facility]) will contact your insurance carrier to obtain pre-certi

## 2018-10-27 ENCOUNTER — HOSPITAL ENCOUNTER (OUTPATIENT)
Dept: CT IMAGING | Facility: HOSPITAL | Age: 48
Discharge: HOME | End: 2018-10-27
Attending: PHYSICIAN ASSISTANT
Payer: COMMERCIAL

## 2018-10-27 DIAGNOSIS — M54.2 NECK PAIN: ICD-10-CM

## 2018-10-27 DIAGNOSIS — Z86.79 HISTORY OF CAROTID ARTERY DISSECTION: ICD-10-CM

## 2018-10-27 PROCEDURE — 70496 CT ANGIOGRAPHY HEAD: CPT | Performed by: PHYSICIAN ASSISTANT

## 2018-10-27 PROCEDURE — 70498 CT ANGIOGRAPHY NECK: CPT | Performed by: PHYSICIAN ASSISTANT

## 2018-10-27 PROCEDURE — 82565 ASSAY OF CREATININE: CPT

## 2018-12-10 ENCOUNTER — OFFICE VISIT (OUTPATIENT)
Dept: FAMILY MEDICINE CLINIC | Facility: CLINIC | Age: 48
End: 2018-12-10
Payer: COMMERCIAL

## 2018-12-10 VITALS
WEIGHT: 156.81 LBS | HEART RATE: 62 BPM | DIASTOLIC BLOOD PRESSURE: 62 MMHG | BODY MASS INDEX: 25.2 KG/M2 | TEMPERATURE: 97 F | OXYGEN SATURATION: 99 % | SYSTOLIC BLOOD PRESSURE: 122 MMHG | HEIGHT: 66 IN | RESPIRATION RATE: 18 BRPM

## 2018-12-10 DIAGNOSIS — E04.9 GOITER: ICD-10-CM

## 2018-12-10 DIAGNOSIS — Z00.00 ANNUAL PHYSICAL EXAM: Primary | ICD-10-CM

## 2018-12-10 DIAGNOSIS — Z23 NEED FOR VACCINATION: ICD-10-CM

## 2018-12-10 DIAGNOSIS — Z12.11 SCREEN FOR COLON CANCER: ICD-10-CM

## 2018-12-10 PROCEDURE — 90686 IIV4 VACC NO PRSV 0.5 ML IM: CPT | Performed by: FAMILY MEDICINE

## 2018-12-10 PROCEDURE — 90471 IMMUNIZATION ADMIN: CPT | Performed by: FAMILY MEDICINE

## 2018-12-10 PROCEDURE — 90715 TDAP VACCINE 7 YRS/> IM: CPT | Performed by: FAMILY MEDICINE

## 2018-12-10 PROCEDURE — 90472 IMMUNIZATION ADMIN EACH ADD: CPT | Performed by: FAMILY MEDICINE

## 2018-12-10 PROCEDURE — 99396 PREV VISIT EST AGE 40-64: CPT | Performed by: FAMILY MEDICINE

## 2018-12-10 NOTE — PROGRESS NOTES
HPI:   Zane Marshall is a 51year old female who presents for a complete physical exam.     Wt Readings from Last 6 Encounters:  12/10/18 : 156 lb 12.8 oz  10/22/18 : 155 lb  09/21/18 : 155 lb  07/25/18 : 157 lb  11/15/17 : 152 lb  10/13/17 : 150 lb Oral Tab Take 1 tablet (50 mg total) by mouth every 8 (eight) hours as needed for Pain. Disp: 40 tablet Rfl: 0   Butalbital-APAP-Caffeine (FIORICET, ESGIC) -40 MG Oral Tab Take 1 tablet by mouth every 4 (four) hours as needed for Headaches.  Disp: 60 Smokeless tobacco: Never Used    Alcohol use: Yes      Alcohol/week: 0.0 oz      Comment: socially    Drug use: No    Occ: . : 4. 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   Diet: watches theDrop Goiter    - US THYROID (RNR=[id number]); Future      Discussed diet, exercise,calcium, vitamin D, fish oil and self breast exams. Questions answered and patient indicates understanding of these issues and agrees to the plan.   Follow up in 1 year or sooner if

## 2018-12-19 DIAGNOSIS — G43.011 INTRACTABLE MIGRAINE WITHOUT AURA AND WITH STATUS MIGRAINOSUS: Primary | ICD-10-CM

## 2018-12-19 DIAGNOSIS — G89.29 CHRONIC INTRACTABLE HEADACHE, UNSPECIFIED HEADACHE TYPE: ICD-10-CM

## 2018-12-19 DIAGNOSIS — R51.9 CHRONIC INTRACTABLE HEADACHE, UNSPECIFIED HEADACHE TYPE: ICD-10-CM

## 2018-12-19 NOTE — TELEPHONE ENCOUNTER
Pt called for refill of butalbital (fiorocet). [address], [facility].

## 2018-12-19 NOTE — TELEPHONE ENCOUNTER
Medication: Fiorocet    Date of last refill: 1/20/16 (#60/2)  [place] Nothing filled since 6/22/18    Last office visit: 10/22/18   Due back to clinic per last office note:  Return in about 6 months (around 4/22/2019).

## 2018-12-20 RX ORDER — BUTALBITAL, ACETAMINOPHEN AND CAFFEINE 50; 325; 40 MG/1; MG/1; MG/1
TABLET ORAL
Qty: 60 TABLET | Refills: 0 | Status: SHIPPED
Start: 2018-12-20 | End: 2020-04-01

## 2018-12-20 NOTE — TELEPHONE ENCOUNTER
Prescription approved for Butalbital and faxed to [facility] pharmacy with receipt confirmation.

## 2019-02-20 ENCOUNTER — HOSPITAL ENCOUNTER (OUTPATIENT)
Dept: ULTRASOUND IMAGING | Age: 49
Discharge: HOME | End: 2019-02-20
Attending: FAMILY MEDICINE
Payer: COMMERCIAL

## 2019-02-20 ENCOUNTER — OFFICE VISIT (OUTPATIENT)
Dept: FAMILY MEDICINE CLINIC | Facility: CLINIC | Age: 49
End: 2019-02-20
Payer: COMMERCIAL

## 2019-02-20 VITALS
DIASTOLIC BLOOD PRESSURE: 74 MMHG | RESPIRATION RATE: 16 BRPM | HEART RATE: 67 BPM | WEIGHT: 151 LBS | OXYGEN SATURATION: 91 % | TEMPERATURE: 98 F | SYSTOLIC BLOOD PRESSURE: 120 MMHG | BODY MASS INDEX: 24.27 KG/M2 | HEIGHT: 66 IN

## 2019-02-20 DIAGNOSIS — M79.89 LEG SWELLING: ICD-10-CM

## 2019-02-20 DIAGNOSIS — M79.89 LEG SWELLING: Primary | ICD-10-CM

## 2019-02-20 DIAGNOSIS — I82.401 ACUTE DEEP VEIN THROMBOSIS (DVT) OF RIGHT LOWER EXTREMITY, UNSPECIFIED VEIN (HCC): ICD-10-CM

## 2019-02-20 PROCEDURE — 93971 EXTREMITY STUDY: CPT | Performed by: FAMILY MEDICINE

## 2019-02-20 PROCEDURE — 99214 OFFICE O/P EST MOD 30 MIN: CPT | Performed by: FAMILY MEDICINE

## 2019-02-20 NOTE — PROGRESS NOTES
HPI:   Nataliya Muro is a 47year old female who presents for right calf swelling and pain     Pt drove to and from [facility] ; only got out one time to walk  Pt sat all weekend for volleyball  Pt has been off of the xarelto for 4 days  Patient denies chest p • Spastic colon    • Thyromegaly    • Tricuspid valve disorder    • Vasculitis (HCC)     Involving cerebral vascular bed & R carotid. • Vasospasm ([address]     During catheter introduction in R coronary artery, Jan 2007.     • Ventricular fibrillation ([address] BMI 24.37 kg/m²   Body mass index is 24.37 kg/m².    GENERAL: alert and oriented X 3, well developed, well nourished,in no apparent distress  CARDIO: RRR without murmur  LUNGS: clear to auscultation  NECK: supple,no adenopathy  HEENT: atraumatic, normocep

## 2019-02-22 ENCOUNTER — OFFICE VISIT (OUTPATIENT)
Dept: FAMILY MEDICINE CLINIC | Facility: CLINIC | Age: 49
End: 2019-02-22
Payer: COMMERCIAL

## 2019-02-22 VITALS
HEART RATE: 69 BPM | OXYGEN SATURATION: 96 % | DIASTOLIC BLOOD PRESSURE: 66 MMHG | HEIGHT: 66 IN | BODY MASS INDEX: 23.95 KG/M2 | RESPIRATION RATE: 16 BRPM | TEMPERATURE: 98 F | WEIGHT: 149 LBS | SYSTOLIC BLOOD PRESSURE: 108 MMHG

## 2019-02-22 DIAGNOSIS — I82.401 ACUTE DEEP VEIN THROMBOSIS (DVT) OF RIGHT LOWER EXTREMITY, UNSPECIFIED VEIN (HCC): Primary | ICD-10-CM

## 2019-02-22 DIAGNOSIS — Z15.89 MTHFR MUTATION: ICD-10-CM

## 2019-02-22 DIAGNOSIS — R52 PAIN MANAGEMENT: ICD-10-CM

## 2019-02-22 DIAGNOSIS — I77.3 FIBROMUSCULAR DYSPLASIA (HCC): ICD-10-CM

## 2019-02-22 PROCEDURE — 99213 OFFICE O/P EST LOW 20 MIN: CPT | Performed by: FAMILY MEDICINE

## 2019-02-22 RX ORDER — HYDROCODONE BITARTRATE AND ACETAMINOPHEN 10; 325 MG/1; MG/1
1 TABLET ORAL EVERY 6 HOURS PRN
Qty: 20 TABLET | Refills: 0 | Status: SHIPPED | OUTPATIENT
Start: 2019-02-22 | End: 2019-04-22

## 2019-02-22 NOTE — PROGRESS NOTES
HPI:   Jamie Jaffe is a 47year old female here for DVT follow up      Pt has been back on her xarelto since her last visit   MTHFR +   Patient denies chest pain, shortness of breath, dizziness, and lightheadedness. No exertional symptoms.   H/o clott valve disorder    • Vasculitis ([facility] [address]     Involving cerebral vascular bed & R carotid. • Vasospasm ([facility] [address]     During catheter introduction in R coronary artery, Jan 2007.     • Ventricular fibrillation (HCC)     Arrest, 2 weeks post delivery of 4th child, A GENERAL: alert and oriented X 3, well developed, well nourished,in no apparent distress  CARDIO: RRR without murmur  LUNGS: clear to auscultation  NECK: supple,no adenopathy  HEENT: atraumatic, normocephalic,ears and throat are clear  EYES:PERRLA, EOMI,

## 2019-02-25 ENCOUNTER — TELEPHONE (OUTPATIENT)
Dept: FAMILY MEDICINE CLINIC | Facility: CLINIC | Age: 49
End: 2019-02-25

## 2019-02-25 NOTE — TELEPHONE ENCOUNTER
Dr. Norma Lay asked patient to check in today. She was in to see her for 2 blood clots. She said she was just doing as told. I don't know what info LE would be looking for.

## 2019-02-26 NOTE — TELEPHONE ENCOUNTER
Spoke to pt, she states she did not fill norco. She does not need it. Pain is better, she did go to work yesterday. She felt fine,  After work she did notice swelling in the legs. She if fine this morning, no swelling.

## 2019-02-28 ENCOUNTER — APPOINTMENT (OUTPATIENT)
Dept: CT IMAGING | Facility: HOSPITAL | Age: 49
End: 2019-02-28
Attending: EMERGENCY MEDICINE
Payer: COMMERCIAL

## 2019-02-28 ENCOUNTER — HOSPITAL ENCOUNTER (EMERGENCY)
Facility: HOSPITAL | Age: 49
Discharge: HOME | End: 2019-02-28
Attending: EMERGENCY MEDICINE
Payer: COMMERCIAL

## 2019-02-28 ENCOUNTER — APPOINTMENT (OUTPATIENT)
Dept: GENERAL RADIOLOGY | Facility: HOSPITAL | Age: 49
End: 2019-02-28
Payer: COMMERCIAL

## 2019-02-28 ENCOUNTER — TELEPHONE (OUTPATIENT)
Dept: FAMILY MEDICINE CLINIC | Facility: CLINIC | Age: 49
End: 2019-02-28

## 2019-02-28 VITALS — WEIGHT: 150 LBS | DIASTOLIC BLOOD PRESSURE: 76 MMHG | SYSTOLIC BLOOD PRESSURE: 112 MMHG | HEART RATE: 68 BPM

## 2019-02-28 VITALS
SYSTOLIC BLOOD PRESSURE: 131 MMHG | DIASTOLIC BLOOD PRESSURE: 71 MMHG | OXYGEN SATURATION: 100 % | TEMPERATURE: 98 F | BODY MASS INDEX: 24.11 KG/M2 | HEART RATE: 78 BPM | WEIGHT: 150 LBS | RESPIRATION RATE: 22 BRPM | HEIGHT: 66 IN

## 2019-02-28 VITALS — HEART RATE: 68 BPM | DIASTOLIC BLOOD PRESSURE: 76 MMHG | SYSTOLIC BLOOD PRESSURE: 104 MMHG | WEIGHT: 157 LBS

## 2019-02-28 VITALS — SYSTOLIC BLOOD PRESSURE: 102 MMHG | HEART RATE: 58 BPM | DIASTOLIC BLOOD PRESSURE: 76 MMHG

## 2019-02-28 DIAGNOSIS — R07.89 CHEST PAIN, ATYPICAL: Primary | ICD-10-CM

## 2019-02-28 LAB
ALBUMIN SERPL-MCNC: 4.3 G/DL (ref 3.4–5)
ALBUMIN/GLOB SERPL: 1.2 {RATIO} (ref 1–2)
ALP LIVER SERPL-CCNC: 84 U/L (ref 39–100)
ALT SERPL-CCNC: 16 U/L (ref 13–56)
ANION GAP SERPL CALC-SCNC: 11 MMOL/L (ref 0–18)
APTT PPP: 42.8 SECONDS (ref 26.1–34.6)
AST SERPL-CCNC: 19 U/L (ref 15–37)
BASOPHILS # BLD AUTO: 0.05 X10(3) UL (ref 0–0.2)
BASOPHILS NFR BLD AUTO: 0.8 %
BILIRUB SERPL-MCNC: 0.7 MG/DL (ref 0.1–2)
BUN BLD-MCNC: 8 MG/DL (ref 7–18)
BUN/CREAT SERPL: 10.7 (ref 10–20)
CALCIUM BLD-MCNC: 8.7 MG/DL (ref 8.5–10.1)
CHLORIDE SERPL-SCNC: 109 MMOL/L (ref 98–107)
CO2 SERPL-SCNC: 22 MMOL/L (ref 21–32)
CREAT BLD-MCNC: 0.75 MG/DL (ref 0.55–1.02)
DEPRECATED RDW RBC AUTO: 40.3 FL (ref 35.1–46.3)
EOSINOPHIL # BLD AUTO: 0.16 X10(3) UL (ref 0–0.7)
EOSINOPHIL NFR BLD AUTO: 2.4 %
ERYTHROCYTE [DISTWIDTH] IN BLOOD BY AUTOMATED COUNT: 13 % (ref 11–15)
GLOBULIN PLAS-MCNC: 3.7 G/DL (ref 2.8–4.4)
GLUCOSE BLD-MCNC: 81 MG/DL (ref 70–99)
HCT VFR BLD AUTO: 44.7 % (ref 35–48)
HGB BLD-MCNC: 15.4 G/DL (ref 12–16)
IMM GRANULOCYTES # BLD AUTO: 0.02 X10(3) UL (ref 0–1)
IMM GRANULOCYTES NFR BLD: 0.3 %
INR BLD: 1.47 (ref 0.9–1.1)
LYMPHOCYTES # BLD AUTO: 2.31 X10(3) UL (ref 1–4)
LYMPHOCYTES NFR BLD AUTO: 34.9 %
M PROTEIN MFR SERPL ELPH: 8 G/DL (ref 6.4–8.2)
MCH RBC QN AUTO: 29.4 PG (ref 26–34)
MCHC RBC AUTO-ENTMCNC: 34.5 G/DL (ref 31–37)
MCV RBC AUTO: 85.3 FL (ref 80–100)
MONOCYTES # BLD AUTO: 0.49 X10(3) UL (ref 0.1–1)
MONOCYTES NFR BLD AUTO: 7.4 %
NEUTROPHILS # BLD AUTO: 3.58 X10 (3) UL (ref 1.5–7.7)
NEUTROPHILS # BLD AUTO: 3.58 X10(3) UL (ref 1.5–7.7)
NEUTROPHILS NFR BLD AUTO: 54.2 %
OSMOLALITY SERPL CALC.SUM OF ELEC: 291 MOSM/KG (ref 275–295)
PLATELET # BLD AUTO: 242 10(3)UL (ref 150–450)
POTASSIUM SERPL-SCNC: 3.3 MMOL/L (ref 3.5–5.1)
PSA SERPL DL<=0.01 NG/ML-MCNC: 18.6 SECONDS (ref 12.5–14.7)
RBC # BLD AUTO: 5.24 X10(6)UL (ref 3.8–5.3)
SODIUM SERPL-SCNC: 142 MMOL/L (ref 136–145)
TROPONIN I SERPL-MCNC: <0.045 NG/ML (ref ?–0.04)
WBC # BLD AUTO: 6.6 X10(3) UL (ref 4–11)

## 2019-02-28 PROCEDURE — 96361 HYDRATE IV INFUSION ADD-ON: CPT

## 2019-02-28 PROCEDURE — 93005 ELECTROCARDIOGRAM TRACING: CPT

## 2019-02-28 PROCEDURE — 85025 COMPLETE CBC W/AUTO DIFF WBC: CPT

## 2019-02-28 PROCEDURE — 84484 ASSAY OF TROPONIN QUANT: CPT

## 2019-02-28 PROCEDURE — 84484 ASSAY OF TROPONIN QUANT: CPT | Performed by: EMERGENCY MEDICINE

## 2019-02-28 PROCEDURE — 99285 EMERGENCY DEPT VISIT HI MDM: CPT

## 2019-02-28 PROCEDURE — 85730 THROMBOPLASTIN TIME PARTIAL: CPT | Performed by: EMERGENCY MEDICINE

## 2019-02-28 PROCEDURE — 93010 ELECTROCARDIOGRAM REPORT: CPT

## 2019-02-28 PROCEDURE — 85025 COMPLETE CBC W/AUTO DIFF WBC: CPT | Performed by: EMERGENCY MEDICINE

## 2019-02-28 PROCEDURE — 96360 HYDRATION IV INFUSION INIT: CPT

## 2019-02-28 PROCEDURE — 71275 CT ANGIOGRAPHY CHEST: CPT | Performed by: EMERGENCY MEDICINE

## 2019-02-28 PROCEDURE — 85610 PROTHROMBIN TIME: CPT | Performed by: EMERGENCY MEDICINE

## 2019-02-28 PROCEDURE — 80053 COMPREHEN METABOLIC PANEL: CPT | Performed by: EMERGENCY MEDICINE

## 2019-02-28 PROCEDURE — 80053 COMPREHEN METABOLIC PANEL: CPT

## 2019-02-28 RX ORDER — SODIUM CHLORIDE 9 MG/ML
INJECTION, SOLUTION INTRAVENOUS CONTINUOUS
Status: DISCONTINUED | OUTPATIENT
Start: 2019-02-28 | End: 2019-02-28

## 2019-02-28 NOTE — TELEPHONE ENCOUNTER
Yesterday not feeling great. Patient keeps getting hot and flushed. Patient is uncomfortable  Not SOB  Feels like someone is pushing on her. She has had clots in her lungs before. Does not feel the same.

## 2019-02-28 NOTE — TELEPHONE ENCOUNTER
Called patient - verified patient's name and  - pt states she currently has a DVT in her right leg, had bilateral PE in , currently taking Xarelto. Pt states starting last night she began to experience intermittent chest pressure.  Advised pt that ping

## 2019-03-01 VITALS
WEIGHT: 150 LBS | BODY MASS INDEX: 23.54 KG/M2 | HEIGHT: 67 IN | SYSTOLIC BLOOD PRESSURE: 104 MMHG | HEART RATE: 72 BPM | DIASTOLIC BLOOD PRESSURE: 66 MMHG

## 2019-03-01 LAB
ATRIAL RATE: 72 BPM
P AXIS: 67 DEGREES
P-R INTERVAL: 144 MS
Q-T INTERVAL: 412 MS
QRS DURATION: 68 MS
QTC CALCULATION (BEZET): 451 MS
R AXIS: 64 DEGREES
T AXIS: 46 DEGREES
VENTRICULAR RATE: 72 BPM

## 2019-03-01 NOTE — ED PROVIDER NOTES
Patient Seen in: [facility] Emergency Department    History   Patient presents with:  Chest Pain Angina (cardiovascular)    Stated Complaint: chest pain- recent R lower leg DVT    HPI    Patient is a pleasant 27-year-old female, with multiple past m • Vasculitis ([address]     Involving cerebral vascular bed & R carotid. • Vasospasm ([address]     During catheter introduction in R coronary artery, Jan 2007. • Ventricular fibrillation ([address]     Arrest, 2 weeks post delivery of 4th child, Aug 2005. respirations are unlabored. There is no wheezing, rales, or rhonchi. HEART: Regular rate and rhythm. There are no murmurs, rubs, or gallops. ABDOMEN: The abdomen is soft, nondistended, nontender. Bowel sounds present.  There is no guarding, rebound, or (obw=[id number])    Result Date: 2/28/2019  PROCEDURE:  CT ANGIOGRAPHY, CHEST (CPT=71275)  COMPARISON:  None.   INDICATIONS:  chest pain- recent R lower leg DVT  TECHNIQUE:  IV contrast-enhanced multislice CT angiography is performed through the pulmonary arteri vein.  PATIENT STATED HISTORY: (As transcribed by Technologist)  Patient states she has right calf pain. She is on blood thinners.  Patient has a history of DVT and PE's in the past.    FINDINGS:  EXTREMITY EXAMINED:  Right lower extremity THROMBI:  Thrombu Impression:  Chest pain, atypical  (primary encounter diagnosis)    Disposition:  Discharge  2/28/2019 10:41 pm    Follow-up:  Dewey Shone, MD  [facility] [address]  [facility] [phone]    Call in 1 day  Schedule follow up as soon as pos

## 2019-03-08 ENCOUNTER — TELEPHONE (OUTPATIENT)
Dept: CARDIOLOGY | Age: 49
End: 2019-03-08

## 2019-04-02 ENCOUNTER — TELEPHONE (OUTPATIENT)
Dept: FAMILY MEDICINE CLINIC | Facility: CLINIC | Age: 49
End: 2019-04-02

## 2019-04-02 NOTE — TELEPHONE ENCOUNTER
Pt was instructed to call Dr. Garland Penaloza if pt was going to fly, due to the fact that pt was diagnosed with BLOOD CLOTS.  pt is going to [facility] this Friday in the morning, is there anything the pt should do. ? Please call pt and advise.

## 2019-04-04 NOTE — TELEPHONE ENCOUNTER
Ok for [address] only   PT NEEDS TO TAKE DAILY / PLEASE CALL IF SHE MISPLACES IT AGAIN AND HAVE FUN

## 2019-04-12 RX ORDER — TRAMADOL HYDROCHLORIDE 50 MG/1
TABLET ORAL
COMMUNITY
End: 2021-06-21 | Stop reason: ALTCHOICE

## 2019-04-12 RX ORDER — AMLODIPINE BESYLATE 5 MG/1
5 TABLET ORAL DAILY
COMMUNITY
End: 2021-02-24 | Stop reason: SDUPTHER

## 2019-04-12 RX ORDER — ZONISAMIDE 100 MG/1
100 CAPSULE ORAL NIGHTLY
COMMUNITY

## 2019-04-12 RX ORDER — ATORVASTATIN CALCIUM 40 MG/1
TABLET, FILM COATED ORAL
COMMUNITY
End: 2021-02-24 | Stop reason: SDUPTHER

## 2019-04-12 RX ORDER — BUTALBITAL, ACETAMINOPHEN AND CAFFEINE 300; 40; 50 MG/1; MG/1; MG/1
1-2 CAPSULE ORAL
COMMUNITY

## 2019-04-22 ENCOUNTER — OFFICE VISIT (OUTPATIENT)
Dept: NEUROLOGY | Facility: CLINIC | Age: 49
End: 2019-04-22
Payer: COMMERCIAL

## 2019-04-22 VITALS
WEIGHT: 151 LBS | HEIGHT: 66 IN | DIASTOLIC BLOOD PRESSURE: 60 MMHG | HEART RATE: 62 BPM | SYSTOLIC BLOOD PRESSURE: 116 MMHG | BODY MASS INDEX: 24.27 KG/M2 | RESPIRATION RATE: 16 BRPM

## 2019-04-22 DIAGNOSIS — Z86.79 HISTORY OF CAROTID ARTERY DISSECTION: ICD-10-CM

## 2019-04-22 DIAGNOSIS — G89.29 CHRONIC INTRACTABLE HEADACHE, UNSPECIFIED HEADACHE TYPE: Primary | ICD-10-CM

## 2019-04-22 DIAGNOSIS — M54.2 NECK PAIN: ICD-10-CM

## 2019-04-22 DIAGNOSIS — R51.9 CHRONIC INTRACTABLE HEADACHE, UNSPECIFIED HEADACHE TYPE: Primary | ICD-10-CM

## 2019-04-22 PROCEDURE — 99214 OFFICE O/P EST MOD 30 MIN: CPT | Performed by: PHYSICIAN ASSISTANT

## 2019-04-22 RX ORDER — ZONISAMIDE 100 MG/1
100 CAPSULE ORAL
Qty: 90 CAPSULE | Refills: 3 | Status: SHIPPED | OUTPATIENT
Start: 2019-04-22 | End: 2019-12-09

## 2019-04-22 NOTE — PROGRESS NOTES
[facility] with [address] [facility]  2/17/1970  Primary Care Provider:  Gabino Samson DO    4/22/2019  Accompanied visit:  (X) No ( ) yes, by:      47year old female patient being seen symptoms. Relevant Neuro as noted above.     Past Medical History:   Diagnosis Date   • AICD (automatic cardioverter/defibrillator) present    • Automatic implantable cardiac defibrillator in situ    • Blood clot in vein    • Cardiac arrest [facility])    • Cardi neck supple  Lungs clear breath sounds  Heart S1S2, no abnormal sounds  Pink nailbeds no Cyanosis    NEURO    NAD, A&OX3  EOMI  CN II-XII intact  Strength 5/5 all extremities  DTRs symmetric  FTN intact bilaterally. No drift on exam  Normal gait.  Tandem ga

## 2019-05-02 ENCOUNTER — APPOINTMENT (OUTPATIENT)
Dept: CARDIOLOGY | Age: 49
End: 2019-05-02
Attending: INTERNAL MEDICINE

## 2019-05-02 PROCEDURE — 93295 DEV INTERROG REMOTE 1/2/MLT: CPT | Performed by: INTERNAL MEDICINE

## 2019-05-02 PROCEDURE — 93296 REM INTERROG EVL PM/IDS: CPT | Performed by: INTERNAL MEDICINE

## 2019-05-31 ENCOUNTER — ANCILLARY PROCEDURE (OUTPATIENT)
Dept: CARDIOLOGY | Age: 49
End: 2019-05-31
Attending: INTERNAL MEDICINE

## 2019-05-31 ENCOUNTER — ANCILLARY ORDERS (OUTPATIENT)
Dept: CARDIOLOGY | Age: 49
End: 2019-05-31

## 2019-05-31 DIAGNOSIS — Z95.810 ICD (IMPLANTABLE CARDIOVERTER-DEFIBRILLATOR) IN PLACE: ICD-10-CM

## 2019-08-08 PROCEDURE — 93295 DEV INTERROG REMOTE 1/2/MLT: CPT | Performed by: INTERNAL MEDICINE

## 2019-08-08 PROCEDURE — 93296 REM INTERROG EVL PM/IDS: CPT | Performed by: INTERNAL MEDICINE

## 2019-09-03 ENCOUNTER — ANCILLARY PROCEDURE (OUTPATIENT)
Dept: CARDIOLOGY | Age: 49
End: 2019-09-03
Attending: INTERNAL MEDICINE

## 2019-09-03 ENCOUNTER — ANCILLARY ORDERS (OUTPATIENT)
Dept: CARDIOLOGY | Age: 49
End: 2019-09-03

## 2019-09-03 DIAGNOSIS — Z95.810 ICD (IMPLANTABLE CARDIOVERTER-DEFIBRILLATOR) IN PLACE: ICD-10-CM

## 2019-09-16 ENCOUNTER — ANCILLARY PROCEDURE (OUTPATIENT)
Dept: CARDIOLOGY | Age: 49
End: 2019-09-16
Attending: INTERNAL MEDICINE

## 2019-09-16 ENCOUNTER — OFFICE VISIT (OUTPATIENT)
Dept: CARDIOLOGY | Age: 49
End: 2019-09-16

## 2019-09-16 VITALS
DIASTOLIC BLOOD PRESSURE: 66 MMHG | BODY MASS INDEX: 23.23 KG/M2 | HEIGHT: 67 IN | WEIGHT: 148 LBS | SYSTOLIC BLOOD PRESSURE: 98 MMHG | HEART RATE: 53 BPM

## 2019-09-16 VITALS
BODY MASS INDEX: 23.23 KG/M2 | HEART RATE: 53 BPM | HEIGHT: 67 IN | WEIGHT: 148 LBS | SYSTOLIC BLOOD PRESSURE: 98 MMHG | DIASTOLIC BLOOD PRESSURE: 66 MMHG

## 2019-09-16 DIAGNOSIS — I77.71 CAROTID ARTERY DISSECTION (CMD): Primary | ICD-10-CM

## 2019-09-16 DIAGNOSIS — Z95.810 ICD (IMPLANTABLE CARDIOVERTER-DEFIBRILLATOR) IN PLACE: ICD-10-CM

## 2019-09-16 DIAGNOSIS — Z45.018 PACEMAKER REPROGRAMMING/CHECK: ICD-10-CM

## 2019-09-16 DIAGNOSIS — Z95.810 ICD (IMPLANTABLE CARDIOVERTER-DEFIBRILLATOR) IN PLACE: Primary | ICD-10-CM

## 2019-09-16 PROCEDURE — 93282 PRGRMG EVAL IMPLANTABLE DFB: CPT | Performed by: INTERNAL MEDICINE

## 2019-09-16 PROCEDURE — 99213 OFFICE O/P EST LOW 20 MIN: CPT | Performed by: INTERNAL MEDICINE

## 2019-09-16 ASSESSMENT — PATIENT HEALTH QUESTIONNAIRE - PHQ9
SUM OF ALL RESPONSES TO PHQ9 QUESTIONS 1 AND 2: 0
1. LITTLE INTEREST OR PLEASURE IN DOING THINGS: NOT AT ALL
SUM OF ALL RESPONSES TO PHQ9 QUESTIONS 1 AND 2: 0
2. FEELING DOWN, DEPRESSED OR HOPELESS: NOT AT ALL

## 2019-11-12 ENCOUNTER — LAB ENCOUNTER (OUTPATIENT)
Dept: LAB | Age: 49
End: 2019-11-12
Attending: FAMILY MEDICINE
Payer: COMMERCIAL

## 2019-11-12 DIAGNOSIS — Z00.00 ANNUAL PHYSICAL EXAM: ICD-10-CM

## 2019-11-12 PROCEDURE — 84439 ASSAY OF FREE THYROXINE: CPT | Performed by: FAMILY MEDICINE

## 2019-11-12 PROCEDURE — 36415 COLL VENOUS BLD VENIPUNCTURE: CPT | Performed by: FAMILY MEDICINE

## 2019-11-12 PROCEDURE — 82607 VITAMIN B-12: CPT | Performed by: FAMILY MEDICINE

## 2019-11-12 PROCEDURE — 80061 LIPID PANEL: CPT | Performed by: FAMILY MEDICINE

## 2019-11-12 PROCEDURE — 82306 VITAMIN D 25 HYDROXY: CPT | Performed by: FAMILY MEDICINE

## 2019-11-12 PROCEDURE — 80050 GENERAL HEALTH PANEL: CPT | Performed by: FAMILY MEDICINE

## 2019-11-14 PROCEDURE — 93296 REM INTERROG EVL PM/IDS: CPT | Performed by: INTERNAL MEDICINE

## 2019-12-09 ENCOUNTER — OFFICE VISIT (OUTPATIENT)
Dept: NEUROLOGY | Facility: CLINIC | Age: 49
End: 2019-12-09
Payer: COMMERCIAL

## 2019-12-09 VITALS
HEART RATE: 74 BPM | DIASTOLIC BLOOD PRESSURE: 78 MMHG | WEIGHT: 153 LBS | BODY MASS INDEX: 24.59 KG/M2 | HEIGHT: 66 IN | RESPIRATION RATE: 16 BRPM | SYSTOLIC BLOOD PRESSURE: 117 MMHG

## 2019-12-09 DIAGNOSIS — G89.29 CHRONIC INTRACTABLE HEADACHE, UNSPECIFIED HEADACHE TYPE: ICD-10-CM

## 2019-12-09 DIAGNOSIS — G43.011 INTRACTABLE MIGRAINE WITHOUT AURA AND WITH STATUS MIGRAINOSUS: Primary | ICD-10-CM

## 2019-12-09 DIAGNOSIS — I77.6 VASCULITIS (HCC): ICD-10-CM

## 2019-12-09 DIAGNOSIS — R51.9 CHRONIC INTRACTABLE HEADACHE, UNSPECIFIED HEADACHE TYPE: ICD-10-CM

## 2019-12-09 PROCEDURE — 99214 OFFICE O/P EST MOD 30 MIN: CPT | Performed by: OTHER

## 2019-12-09 RX ORDER — ZONISAMIDE 100 MG/1
100 CAPSULE ORAL
Qty: 90 CAPSULE | Refills: 3 | Status: SHIPPED | OUTPATIENT
Start: 2019-12-09 | End: 2020-06-08

## 2019-12-09 NOTE — PROGRESS NOTES
[facility] with [address]  2/17/1970  Primary Care Provider:  Nicole Carrera DO    12/9/2019  Accompanied visit:      (x) No.        47year old yo patient being seen for:  Delroy Schumacher without aura and with status migrainosus  (primary encounter diagnosis)  Chronic intractable headache, unspecified headache type  Vasculitis ([facility]    Discussion plus Diagnostics & Treatment Orders:  Clinically stable neurologic condition  Renewed necessary

## 2019-12-29 NOTE — PROGRESS NOTES
HPI:   Igor Kim is a 47year old female who presents for a complete physical exam.     Wt Readings from Last 6 Encounters:  12/30/19 : 153 lb (69.4 kg)  12/09/19 : 153 lb (69.4 kg)  11/12/19 : 153 lb (69.4 kg)  08/14/19 : 151 lb (68.5 kg)  04/22/1 MG Oral Tab Take 1 tablet (20 mg total) by mouth daily.  90 tablet 3   • Butalbital-APAP-Caffeine -40 MG Oral Tab Take 1-2 tab po q 4-6 hours prn for headache 60 tablet 0      Past Medical History:   Diagnosis Date   • AICD (automatic cardioverter/def Alcohol/week: 0.0 standard drinks      Comment: socially    Drug use: No    Occ: . : 4. 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   Diet: watches calories closely     REVIEW OF SYSTEMS:   GENERAL

## 2019-12-30 ENCOUNTER — OFFICE VISIT (OUTPATIENT)
Dept: FAMILY MEDICINE CLINIC | Facility: CLINIC | Age: 49
End: 2019-12-30
Payer: COMMERCIAL

## 2019-12-30 VITALS
TEMPERATURE: 99 F | SYSTOLIC BLOOD PRESSURE: 124 MMHG | OXYGEN SATURATION: 99 % | HEIGHT: 66 IN | WEIGHT: 153 LBS | DIASTOLIC BLOOD PRESSURE: 80 MMHG | RESPIRATION RATE: 18 BRPM | HEART RATE: 63 BPM | BODY MASS INDEX: 24.59 KG/M2

## 2019-12-30 DIAGNOSIS — Z23 NEED FOR VACCINATION: ICD-10-CM

## 2019-12-30 DIAGNOSIS — E04.9 GOITER: ICD-10-CM

## 2019-12-30 DIAGNOSIS — Z00.00 ANNUAL PHYSICAL EXAM: Primary | ICD-10-CM

## 2019-12-30 DIAGNOSIS — Z12.11 SCREEN FOR COLON CANCER: ICD-10-CM

## 2019-12-30 PROCEDURE — 99396 PREV VISIT EST AGE 40-64: CPT | Performed by: FAMILY MEDICINE

## 2019-12-30 PROCEDURE — 90686 IIV4 VACC NO PRSV 0.5 ML IM: CPT | Performed by: FAMILY MEDICINE

## 2019-12-30 PROCEDURE — 90471 IMMUNIZATION ADMIN: CPT | Performed by: FAMILY MEDICINE

## 2020-01-03 ENCOUNTER — TELEPHONE (OUTPATIENT)
Dept: CARDIOLOGY | Age: 50
End: 2020-01-03

## 2020-02-20 ENCOUNTER — ANCILLARY ORDERS (OUTPATIENT)
Dept: CARDIOLOGY | Age: 50
End: 2020-02-20

## 2020-02-20 ENCOUNTER — ANCILLARY PROCEDURE (OUTPATIENT)
Dept: CARDIOLOGY | Age: 50
End: 2020-02-20
Attending: INTERNAL MEDICINE

## 2020-02-20 DIAGNOSIS — Z45.02 ENCOUNTER FOR ASSESSMENT OF IMPLANTABLE CARDIOVERTER-DEFIBRILLATOR (ICD): ICD-10-CM

## 2020-02-20 PROCEDURE — 93296 REM INTERROG EVL PM/IDS: CPT | Performed by: INTERNAL MEDICINE

## 2020-02-20 PROCEDURE — 93295 DEV INTERROG REMOTE 1/2/MLT: CPT | Performed by: INTERNAL MEDICINE

## 2020-02-20 PROCEDURE — X1114 CARDIAC DEVICE HOME CHECK - REMOTE UNSCHEDULED: HCPCS | Performed by: INTERNAL MEDICINE

## 2020-02-21 ENCOUNTER — TELEPHONE (OUTPATIENT)
Dept: CARDIOLOGY | Age: 50
End: 2020-02-21

## 2020-03-31 DIAGNOSIS — G43.011 INTRACTABLE MIGRAINE WITHOUT AURA AND WITH STATUS MIGRAINOSUS: ICD-10-CM

## 2020-03-31 DIAGNOSIS — R51.9 CHRONIC INTRACTABLE HEADACHE, UNSPECIFIED HEADACHE TYPE: ICD-10-CM

## 2020-03-31 DIAGNOSIS — G89.29 CHRONIC INTRACTABLE HEADACHE, UNSPECIFIED HEADACHE TYPE: ICD-10-CM

## 2020-03-31 NOTE — TELEPHONE ENCOUNTER
Medication: Butalbital    Date of last refill: 12/20/2018 (#60/0)  [place]    Last office visit: 12/09/2019  Due back to clinic per last office note:  Not specified  Date next office visit scheduled:    Future Appoin

## 2020-04-01 RX ORDER — BUTALBITAL, ACETAMINOPHEN AND CAFFEINE 50; 325; 40 MG/1; MG/1; MG/1
TABLET ORAL
Qty: 60 TABLET | Refills: 0 | Status: SHIPPED | OUTPATIENT
Start: 2020-04-01 | End: 2020-08-21

## 2020-05-28 PROCEDURE — 93296 REM INTERROG EVL PM/IDS: CPT | Performed by: INTERNAL MEDICINE

## 2020-05-28 PROCEDURE — 93295 DEV INTERROG REMOTE 1/2/MLT: CPT | Performed by: INTERNAL MEDICINE

## 2020-06-02 ENCOUNTER — ANCILLARY ORDERS (OUTPATIENT)
Dept: CARDIOLOGY | Age: 50
End: 2020-06-02

## 2020-06-02 ENCOUNTER — ANCILLARY PROCEDURE (OUTPATIENT)
Dept: CARDIOLOGY | Age: 50
End: 2020-06-02
Attending: INTERNAL MEDICINE

## 2020-06-02 DIAGNOSIS — Z45.02 ENCOUNTER FOR ASSESSMENT OF IMPLANTABLE CARDIOVERTER-DEFIBRILLATOR (ICD): ICD-10-CM

## 2020-06-02 PROCEDURE — X1114 CARDIAC DEVICE HOME CHECK - REMOTE UNSCHEDULED: HCPCS | Performed by: INTERNAL MEDICINE

## 2020-06-06 DIAGNOSIS — R51.9 CHRONIC INTRACTABLE HEADACHE, UNSPECIFIED HEADACHE TYPE: ICD-10-CM

## 2020-06-06 DIAGNOSIS — G89.29 CHRONIC INTRACTABLE HEADACHE, UNSPECIFIED HEADACHE TYPE: ICD-10-CM

## 2020-06-08 DIAGNOSIS — R51.9 CHRONIC INTRACTABLE HEADACHE, UNSPECIFIED HEADACHE TYPE: ICD-10-CM

## 2020-06-08 DIAGNOSIS — G89.29 CHRONIC INTRACTABLE HEADACHE, UNSPECIFIED HEADACHE TYPE: ICD-10-CM

## 2020-06-08 RX ORDER — ZONISAMIDE 100 MG/1
CAPSULE ORAL
Qty: 90 CAPSULE | Refills: 0 | OUTPATIENT
Start: 2020-06-08

## 2020-06-08 NOTE — TELEPHONE ENCOUNTER
Pt should have refills remaining on this Rx. Spoke with pharmacist Allison Talbert at pharmacy who confirmed that Rx from December was on hold. They will fill from that.   Will disregard this request.    Medication: ZONISAMIDE 100 MG Oral Cap    Date of last refill

## 2020-06-09 RX ORDER — ZONISAMIDE 100 MG/1
100 CAPSULE ORAL
Qty: 90 CAPSULE | Refills: 3 | Status: SHIPPED | OUTPATIENT
Start: 2020-06-09 | End: 2020-12-14

## 2020-06-09 NOTE — TELEPHONE ENCOUNTER
Medication:   zonisamide 100 MG Oral Cap    Date of last refill: 12/9/2019 (#90/3)  [place] 8/5/5731    Last office visit: 12/9/2019  Due back to clinic per last office note:  N/A  Date next office visit scheduled:     Fu

## 2020-06-12 ENCOUNTER — VIRTUAL PHONE E/M (OUTPATIENT)
Dept: FAMILY MEDICINE CLINIC | Facility: CLINIC | Age: 50
End: 2020-06-12
Payer: COMMERCIAL

## 2020-06-12 DIAGNOSIS — I77.3 FIBROMUSCULAR DYSPLASIA (HCC): ICD-10-CM

## 2020-06-12 DIAGNOSIS — D21.9 FIBROIDS: Primary | ICD-10-CM

## 2020-06-12 DIAGNOSIS — N92.1 MENORRHAGIA WITH IRREGULAR CYCLE: ICD-10-CM

## 2020-06-12 DIAGNOSIS — I42.8 OTHER CARDIOMYOPATHY (HCC): ICD-10-CM

## 2020-06-12 PROCEDURE — 99213 OFFICE O/P EST LOW 20 MIN: CPT | Performed by: FAMILY MEDICINE

## 2020-06-12 NOTE — PROGRESS NOTES
Virtual Telephone Check-In    Maxisarathyas Bienvenido verbally consents to a Virtual/Telephone Check-In visit on 06/12/20. Patient has been referred to the [facility] website at [email or web address] to review the yearly Consent to Treat document.     Patient under History:   Diagnosis Date   • Abdominal pain    • AICD (automatic cardioverter/defibrillator) present    • Automatic implantable cardiac defibrillator in situ    • Blood clot in vein    • Cardiac arrest [facility])    • Cardiac defibrillator in place    • Cardio Attack Maternal Grandmother    • Stroke Maternal Grandmother       Social History:   Social History    Tobacco Use      Smoking status: Never Smoker      Smokeless tobacco: Never Used    Alcohol use:  Yes      Alcohol/week: 0.0 standard drinks      Comment:

## 2020-06-18 ENCOUNTER — TELEPHONE (OUTPATIENT)
Dept: NEUROLOGY | Facility: CLINIC | Age: 50
End: 2020-06-18

## 2020-06-18 DIAGNOSIS — R51.9 CHRONIC INTRACTABLE HEADACHE, UNSPECIFIED HEADACHE TYPE: Primary | ICD-10-CM

## 2020-06-18 DIAGNOSIS — G89.29 CHRONIC INTRACTABLE HEADACHE, UNSPECIFIED HEADACHE TYPE: Primary | ICD-10-CM

## 2020-06-18 RX ORDER — METHYLPREDNISOLONE 4 MG/1
TABLET ORAL
Qty: 1 PACKAGE | Refills: 0 | Status: SHIPPED | OUTPATIENT
Start: 2020-06-18 | End: 2020-10-14

## 2020-06-18 NOTE — TELEPHONE ENCOUNTER
Acute Migraine assessment:    Is this your typical migraine? Describe any change in character from past migraines.   Yes:     Location of Pain (select all that apply):  right sideback of head and neck   # Days pain has been present:      Describe the pain:

## 2020-06-18 NOTE — TELEPHONE ENCOUNTER
RN LM to call the office by 4:00pm as that is when we close and will not be open again until Monday morning. RN needed to verify the symptoms she is experiencing.

## 2020-06-18 NOTE — TELEPHONE ENCOUNTER
RN spoke to provider and was informed to order the medrol dose pack. RN called the patient to inform her.      Previous TE.

## 2020-06-18 NOTE — TELEPHONE ENCOUNTER
Pt experiencing several bad headaches and unable to control with medications. Butalbital not helping.     Call pt

## 2020-06-22 NOTE — TELEPHONE ENCOUNTER
She is better with the steroid (headache)  She is going to have Hysterectomy and will be on HRT and wondering its effect on FMD and stroke and heart disease  Advised to get follow up with Dr Ariel Blair and myself and meanwhile will look at updated literature on

## 2020-06-23 ENCOUNTER — OFFICE VISIT (OUTPATIENT)
Dept: NEUROLOGY | Facility: CLINIC | Age: 50
End: 2020-06-23
Payer: COMMERCIAL

## 2020-06-23 VITALS
DIASTOLIC BLOOD PRESSURE: 70 MMHG | SYSTOLIC BLOOD PRESSURE: 104 MMHG | TEMPERATURE: 99 F | HEART RATE: 70 BPM | RESPIRATION RATE: 16 BRPM

## 2020-06-23 DIAGNOSIS — Z86.79 HISTORY OF CAROTID ARTERY DISSECTION: ICD-10-CM

## 2020-06-23 DIAGNOSIS — I77.6 VASCULITIS (HCC): ICD-10-CM

## 2020-06-23 DIAGNOSIS — R51.9 CHRONIC INTRACTABLE HEADACHE, UNSPECIFIED HEADACHE TYPE: Primary | ICD-10-CM

## 2020-06-23 DIAGNOSIS — G89.29 CHRONIC INTRACTABLE HEADACHE, UNSPECIFIED HEADACHE TYPE: Primary | ICD-10-CM

## 2020-06-23 PROCEDURE — 99214 OFFICE O/P EST MOD 30 MIN: CPT | Performed by: OTHER

## 2020-06-23 RX ORDER — NARATRIPTAN 1 MG/1
TABLET ORAL
Qty: 12 TABLET | Refills: 5 | Status: SHIPPED | OUTPATIENT
Start: 2020-06-23 | End: 2021-04-29

## 2020-06-23 NOTE — PROGRESS NOTES
[address] [place] [address] with [facility]  6/23/2020    4:27 PM      Cc: HEADACHE    A few weeks ago she began experiencing increasing headaches usually one-sided on the right side with the same migrainous qualities 5 MG Oral Tab, Take 1 tablet (5 mg total) by mouth daily. , Disp: 90 tablet, Rfl: 3  •  Rivaroxaban (XARELTO) 20 MG Oral Tab, Take 1 tablet (20 mg total) by mouth daily. , Disp: 90 tablet, Rfl: 3    /70 (BP Location: Right arm, Patient Position: Sittin

## 2020-06-24 ENCOUNTER — LAB ENCOUNTER (OUTPATIENT)
Dept: LAB | Age: 50
End: 2020-06-24
Attending: FAMILY MEDICINE
Payer: COMMERCIAL

## 2020-06-24 ENCOUNTER — HOSPITAL ENCOUNTER (OUTPATIENT)
Dept: ULTRASOUND IMAGING | Age: 50
Discharge: HOME | End: 2020-06-24
Attending: FAMILY MEDICINE
Payer: COMMERCIAL

## 2020-06-24 ENCOUNTER — APPOINTMENT (OUTPATIENT)
Dept: LAB | Age: 50
End: 2020-06-24
Attending: FAMILY MEDICINE
Payer: COMMERCIAL

## 2020-06-24 DIAGNOSIS — D21.9 FIBROIDS: ICD-10-CM

## 2020-06-24 DIAGNOSIS — N92.1 MENORRHAGIA WITH IRREGULAR CYCLE: ICD-10-CM

## 2020-06-24 DIAGNOSIS — E04.9 GOITER: ICD-10-CM

## 2020-06-24 PROCEDURE — 82728 ASSAY OF FERRITIN: CPT

## 2020-06-24 PROCEDURE — 85025 COMPLETE CBC W/AUTO DIFF WBC: CPT

## 2020-06-24 PROCEDURE — 36415 COLL VENOUS BLD VENIPUNCTURE: CPT

## 2020-06-24 PROCEDURE — 76536 US EXAM OF HEAD AND NECK: CPT | Performed by: FAMILY MEDICINE

## 2020-06-26 DIAGNOSIS — R79.0 LOW FERRITIN: Primary | ICD-10-CM

## 2020-08-21 DIAGNOSIS — G43.011 INTRACTABLE MIGRAINE WITHOUT AURA AND WITH STATUS MIGRAINOSUS: ICD-10-CM

## 2020-08-21 DIAGNOSIS — G89.29 CHRONIC INTRACTABLE HEADACHE, UNSPECIFIED HEADACHE TYPE: ICD-10-CM

## 2020-08-21 DIAGNOSIS — R51.9 CHRONIC INTRACTABLE HEADACHE, UNSPECIFIED HEADACHE TYPE: ICD-10-CM

## 2020-08-21 RX ORDER — BUTALBITAL, ACETAMINOPHEN AND CAFFEINE 50; 325; 40 MG/1; MG/1; MG/1
TABLET ORAL
Qty: 60 TABLET | Refills: 0 | Status: SHIPPED | OUTPATIENT
Start: 2020-08-21 | End: 2020-10-26

## 2020-08-21 NOTE — TELEPHONE ENCOUNTER
Bultalbotol refill  Pt only has enough for the weekend. Send to:  Dean Mcgovern questioning if she is able to go to school if she has headaches due to covid. Pt has questions and about increased usage.

## 2020-08-21 NOTE — TELEPHONE ENCOUNTER
Medication: butalbital    Date of last refill: 4/1/20  [place] 4/4/65    Last office visit: 6/23/2020  Due back to clinic per last office note:  6 months  Date next office visit scheduled:  No future appointments.     Bernadette Young

## 2020-08-24 NOTE — TELEPHONE ENCOUNTER
LMTCB to discuss chronic migraines and school COVID precautions. Per initial call, patient is concerned that when her headaches occur she will be \"quarantined\" with COVID-suspicious symptoms.

## 2020-09-03 PROCEDURE — 93296 REM INTERROG EVL PM/IDS: CPT | Performed by: INTERNAL MEDICINE

## 2020-09-21 ENCOUNTER — APPOINTMENT (OUTPATIENT)
Dept: CARDIOLOGY | Age: 50
End: 2020-09-21
Attending: INTERNAL MEDICINE

## 2020-09-21 ENCOUNTER — TELEPHONE (OUTPATIENT)
Dept: CARDIOLOGY | Age: 50
End: 2020-09-21

## 2020-09-22 ENCOUNTER — TELEPHONE (OUTPATIENT)
Dept: FAMILY MEDICINE CLINIC | Facility: CLINIC | Age: 50
End: 2020-09-22

## 2020-09-22 LAB — AMB EXT COVID-19 RESULT: DETECTED

## 2020-09-22 NOTE — TELEPHONE ENCOUNTER
Pt tested positive for covid -19 . She wants to know if we need her positive test results to be put in her file. Her and her daughter got tested and are positive.  And the rest of  Her family is negatige

## 2020-10-01 ENCOUNTER — TELEPHONE (OUTPATIENT)
Dept: CARDIOLOGY | Age: 50
End: 2020-10-01

## 2020-10-05 ENCOUNTER — TELEPHONE (OUTPATIENT)
Dept: FAMILY MEDICINE CLINIC | Facility: CLINIC | Age: 50
End: 2020-10-05

## 2020-10-05 NOTE — TELEPHONE ENCOUNTER
Patient tested positive for COVID. She is back to work but experiencing   Headaches  Fatigue  No sense of taste or smell    Patient is currently taking butalbital for the headaches and doesn't want to take if everyday.     She wants to know if LE can help

## 2020-10-13 NOTE — TELEPHONE ENCOUNTER
Pt states she was diagnosed with covid 9/22 she is still having headaches and loss of taste and smell, she is also hoarse. Okay to overbook for video visit tomorrow?

## 2020-10-14 ENCOUNTER — TELEMEDICINE (OUTPATIENT)
Dept: FAMILY MEDICINE CLINIC | Facility: CLINIC | Age: 50
End: 2020-10-14

## 2020-10-14 DIAGNOSIS — U07.1 COVID-19 VIRUS INFECTION: Primary | ICD-10-CM

## 2020-10-14 DIAGNOSIS — G44.021 INTRACTABLE CHRONIC CLUSTER HEADACHE: ICD-10-CM

## 2020-10-14 DIAGNOSIS — R49.0 HOARSENESS: ICD-10-CM

## 2020-10-14 PROCEDURE — 99214 OFFICE O/P EST MOD 30 MIN: CPT | Performed by: FAMILY MEDICINE

## 2020-10-14 RX ORDER — CYCLOBENZAPRINE HCL 5 MG
5 TABLET ORAL 3 TIMES DAILY PRN
Qty: 20 TABLET | Refills: 0 | Status: SHIPPED | OUTPATIENT
Start: 2020-10-14 | End: 2020-12-21 | Stop reason: ALTCHOICE

## 2020-10-14 RX ORDER — OMEPRAZOLE 40 MG/1
40 CAPSULE, DELAYED RELEASE ORAL DAILY
Qty: 30 CAPSULE | Refills: 2 | Status: SHIPPED | OUTPATIENT
Start: 2020-10-14 | End: 2020-12-21 | Stop reason: ALTCHOICE

## 2020-10-14 RX ORDER — PREDNISONE 20 MG/1
60 TABLET ORAL DAILY
Qty: 15 TABLET | Refills: 0 | Status: SHIPPED | OUTPATIENT
Start: 2020-10-14 | End: 2020-10-19

## 2020-10-14 NOTE — PROGRESS NOTES
This visit is conducted using Telemedicine with live, interactive video and audio.     Telehealth outside of [address] Verbal Consent   I conducted a telehealth visit with Redd Vaughn today, 10/14/20, which was completed using two-way, real- daily HA's - pt taking fiorcet for a few days and then will take a break   (pt typically gets HA's but different now - these HA's start at base of neck)   Continued hoarseness - has been persistent for 2 weeks     Pt has not taken any abx or steroids   No • Tricuspid valve disorder    • Vasculitis (HCC)     Involving cerebral vascular bed & R carotid. • Vasospasm ([facility] [address]     During catheter introduction in R coronary artery, Jan 2007.     • Ventricular fibrillation (HCC)     Arrest, 2 weeks post delivery denies back pain  PSYCHE: denies depression or anxiety  HEMATOLOGIC: denies hx of anemia  ENDOCRINE: denies thyroid history  ALL/ASTHMA: denies asthma    EXAM:   alert, appears stated age and cooperative, Normocephalic, without obvious abnormality, atrauma

## 2020-10-25 ENCOUNTER — PATIENT MESSAGE (OUTPATIENT)
Dept: NEUROLOGY | Facility: CLINIC | Age: 50
End: 2020-10-25

## 2020-10-26 ENCOUNTER — TELEPHONE (OUTPATIENT)
Dept: NEUROLOGY | Facility: CLINIC | Age: 50
End: 2020-10-26

## 2020-10-26 ENCOUNTER — TELEMEDICINE (OUTPATIENT)
Dept: NEUROLOGY | Facility: CLINIC | Age: 50
End: 2020-10-26
Payer: COMMERCIAL

## 2020-10-26 DIAGNOSIS — Z86.79 HISTORY OF CAROTID ARTERY DISSECTION: ICD-10-CM

## 2020-10-26 DIAGNOSIS — G43.011 INTRACTABLE MIGRAINE WITHOUT AURA AND WITH STATUS MIGRAINOSUS: Primary | ICD-10-CM

## 2020-10-26 DIAGNOSIS — G89.29 CHRONIC INTRACTABLE HEADACHE, UNSPECIFIED HEADACHE TYPE: ICD-10-CM

## 2020-10-26 DIAGNOSIS — R51.9 CHRONIC INTRACTABLE HEADACHE, UNSPECIFIED HEADACHE TYPE: ICD-10-CM

## 2020-10-26 DIAGNOSIS — I77.6 VASCULITIS (HCC): ICD-10-CM

## 2020-10-26 PROCEDURE — 99213 OFFICE O/P EST LOW 20 MIN: CPT | Performed by: OTHER

## 2020-10-26 RX ORDER — PREDNISONE 20 MG/1
TABLET ORAL
Qty: 30 TABLET | Refills: 0 | Status: SHIPPED | OUTPATIENT
Start: 2020-10-26 | End: 2020-12-21 | Stop reason: ALTCHOICE

## 2020-10-26 RX ORDER — BUTALBITAL, ACETAMINOPHEN AND CAFFEINE 50; 325; 40 MG/1; MG/1; MG/1
TABLET ORAL
Qty: 60 TABLET | Refills: 0 | Status: SHIPPED | OUTPATIENT
Start: 2020-10-26 | End: 2021-01-15

## 2020-10-26 NOTE — TELEPHONE ENCOUNTER
From: Igor Kim  To: Vineet Aguilar MD  Sent: 10/25/2020 10:02 PM CDT  Subject: Other    Dr. Whit Nelson,    Is there any way I can get an appointment to see you this week? I have been struggling with headaches again since mid September.      I te

## 2020-10-26 NOTE — PROGRESS NOTES
[facility] with [address] [facility]  2/17/1970  Primary Care Provider:  Shellie Travis DO    10/26/2020  Accompanied visit:      (x) No.    Video visit ---> converted to phone later torri Butalbital-APAP-Caffeine -40 MG Oral Tab, Take 1-2 tab po q 4-6 hours prn for headache, Disp: 60 tablet, Rfl: 0  •  amLODIPine Besylate (NORVASC) 5 MG Oral Tab, Take 1 tablet (5 mg total) by mouth daily. , Disp: 90 tablet, Rfl: 3  •  Naratriptan HCl 1 authorized [facility] member--non F2F  ( x ) other records reviewed --non F2F including consultations  (  ) [facility] meetings - patient not present --non F2F  Non Face to Face CPT code 54291/97953 applies as documented above    PROCEDURE DONE     (   ) see notes

## 2020-10-27 ENCOUNTER — TELEPHONE (OUTPATIENT)
Dept: NEUROLOGY | Facility: CLINIC | Age: 50
End: 2020-10-27

## 2020-10-27 NOTE — TELEPHONE ENCOUNTER
R[employer]  DOS:  10/14/20  SARS antibody and other labs test results received    Placed copy in nurses bin for review  Copy to scanning

## 2020-11-04 ENCOUNTER — TELEPHONE (OUTPATIENT)
Dept: FAMILY MEDICINE CLINIC | Facility: CLINIC | Age: 50
End: 2020-11-04

## 2020-11-04 DIAGNOSIS — R49.0 HOARSENESS: Primary | ICD-10-CM

## 2020-11-04 NOTE — TELEPHONE ENCOUNTER
See previous message spoke to pt  she had televisit  with Dr Marvin Salmon and wanted you to know she is on 2nd week of prednisone and still with hoarse voice and headache. Pt states she wanted you aware. Please advise.

## 2020-11-05 NOTE — TELEPHONE ENCOUNTER
Pt reports breathing isn't that bad. Said O2 sats stay at about 98-99%. Denies any SOB and chest pain. Reports that sometimes she feels \"tightness\" in chest and has noticed that her HR elevates quicker than it used to with any exertion.    States she has

## 2020-11-06 ENCOUNTER — TELEPHONE (OUTPATIENT)
Dept: NEUROLOGY | Facility: CLINIC | Age: 50
End: 2020-11-06

## 2020-11-06 NOTE — TELEPHONE ENCOUNTER
S: Unable to break headache cycle    B: Televisit 10/26 to discuss possible post-covid headache    A: She is starting 20 mg daily prednisone portion of taper. She still gets a headache every afternoon.     Not the same spot every night when headache retu

## 2020-11-06 NOTE — TELEPHONE ENCOUNTER
Prednisone is not working on headache. Per pharmacy, pt cannot refill naratriptan until 11/16/20. Pt asking for alternative.

## 2020-11-06 NOTE — TELEPHONE ENCOUNTER
Julia Liang MD   Otolaryngology (ENT)   [facility]   Phone: [phone] with patient I offered [facility], she said she saw him before and would see anybody else other than his group, Giuliana Cruz phone number provid

## 2020-11-09 NOTE — TELEPHONE ENCOUNTER
LM That I just got aware of this message which was not labeled urgent.   Will call back again  Dr Marco Betancourt

## 2020-11-10 NOTE — TELEPHONE ENCOUNTER
Pt reports prednisone did not help. Headaches continuing. Started since she tested positive for Covid on Sept. 18th. Pt reports the pain rotates around her head describes it squeezing pain.

## 2020-11-11 RX ORDER — CLONAZEPAM 0.5 MG/1
0.5 TABLET ORAL 2 TIMES DAILY PRN
Qty: 20 TABLET | Refills: 0 | Status: SHIPPED | OUTPATIENT
Start: 2020-11-11 | End: 2020-12-21 | Stop reason: ALTCHOICE

## 2020-11-11 NOTE — TELEPHONE ENCOUNTER
RN called the patient and informed her Dr. Jeffrey Simpson called in the Clonazepam prescription. Advised her to call us with an update on her status. Pt verbalized understanding and did not have any further questions.

## 2020-11-11 NOTE — TELEPHONE ENCOUNTER
Current Outpatient Medications:   •  predniSONE 20 MG Oral Tab, 3 tab daily for 5 days, then 2 tab daily for 5 days then 1 tab daily for 5 days then stop, Disp: 30 tablet, Rfl: 0  •  Butalbital-APAP-Caffeine -40 MG Oral Tab, Take 1-2 tab po q 4-6 h

## 2020-11-12 ENCOUNTER — TELEPHONE (OUTPATIENT)
Dept: NEUROLOGY | Facility: CLINIC | Age: 50
End: 2020-11-12

## 2020-11-14 DIAGNOSIS — G43.011 INTRACTABLE MIGRAINE WITHOUT AURA AND WITH STATUS MIGRAINOSUS: Primary | ICD-10-CM

## 2020-11-14 DIAGNOSIS — G89.29 CHRONIC INTRACTABLE HEADACHE, UNSPECIFIED HEADACHE TYPE: ICD-10-CM

## 2020-11-14 DIAGNOSIS — R51.9 CHRONIC INTRACTABLE HEADACHE, UNSPECIFIED HEADACHE TYPE: ICD-10-CM

## 2020-11-16 RX ORDER — NARATRIPTAN 1 MG/1
TABLET ORAL
Qty: 12 TABLET | Refills: 0 | Status: CANCELLED | OUTPATIENT
Start: 2020-11-16

## 2020-11-16 NOTE — TELEPHONE ENCOUNTER
Should still have refills on file.  Responded to [employer] request.     Medication: Naratriptan     Date of last refill: 6/23/2020 for #12/5 additional refills  [place]    Last office visit: 10/26/2020  Due back to clin

## 2020-11-18 ENCOUNTER — E-ADVICE (OUTPATIENT)
Dept: CARDIOLOGY | Age: 50
End: 2020-11-18

## 2020-11-19 ENCOUNTER — TELEPHONE (OUTPATIENT)
Dept: CARDIOLOGY | Age: 50
End: 2020-11-19

## 2020-12-11 ENCOUNTER — ANCILLARY ORDERS (OUTPATIENT)
Dept: CARDIOLOGY | Age: 50
End: 2020-12-11

## 2020-12-11 ENCOUNTER — TELEPHONE (OUTPATIENT)
Dept: NEUROLOGY | Facility: CLINIC | Age: 50
End: 2020-12-11

## 2020-12-11 ENCOUNTER — ANCILLARY PROCEDURE (OUTPATIENT)
Dept: CARDIOLOGY | Age: 50
End: 2020-12-11
Attending: INTERNAL MEDICINE

## 2020-12-11 DIAGNOSIS — R51.9 CHRONIC INTRACTABLE HEADACHE, UNSPECIFIED HEADACHE TYPE: ICD-10-CM

## 2020-12-11 DIAGNOSIS — G89.29 CHRONIC INTRACTABLE HEADACHE, UNSPECIFIED HEADACHE TYPE: ICD-10-CM

## 2020-12-11 DIAGNOSIS — Z95.810 ICD (IMPLANTABLE CARDIOVERTER-DEFIBRILLATOR) IN PLACE: ICD-10-CM

## 2020-12-11 PROCEDURE — 93295 DEV INTERROG REMOTE 1/2/MLT: CPT | Performed by: INTERNAL MEDICINE

## 2020-12-11 PROCEDURE — X1114 CARDIAC DEVICE HOME CHECK - REMOTE UNSCHEDULED: HCPCS | Performed by: INTERNAL MEDICINE

## 2020-12-11 PROCEDURE — 93296 REM INTERROG EVL PM/IDS: CPT | Performed by: INTERNAL MEDICINE

## 2020-12-14 RX ORDER — ZONISAMIDE 100 MG/1
CAPSULE ORAL
Qty: 90 CAPSULE | Refills: 3 | Status: SHIPPED | OUTPATIENT
Start: 2020-12-14 | End: 2020-12-30

## 2020-12-14 NOTE — TELEPHONE ENCOUNTER
Medication:Zonisamide 100 mg     Date of last refill: 609/20 with 3 ddt refills  [place]     Last office visit: 10/26/2020  Due back to clinic per last office note:  6 weeks  Date next office visit scheduled:  not ye

## 2020-12-21 ENCOUNTER — OFFICE VISIT (OUTPATIENT)
Dept: FAMILY MEDICINE CLINIC | Facility: CLINIC | Age: 50
End: 2020-12-21
Payer: COMMERCIAL

## 2020-12-21 VITALS
SYSTOLIC BLOOD PRESSURE: 118 MMHG | BODY MASS INDEX: 25.39 KG/M2 | HEART RATE: 63 BPM | DIASTOLIC BLOOD PRESSURE: 80 MMHG | HEIGHT: 66 IN | RESPIRATION RATE: 18 BRPM | WEIGHT: 158 LBS | OXYGEN SATURATION: 98 %

## 2020-12-21 DIAGNOSIS — Z00.00 ANNUAL PHYSICAL EXAM: Primary | ICD-10-CM

## 2020-12-21 PROCEDURE — 3074F SYST BP LT 130 MM HG: CPT | Performed by: FAMILY MEDICINE

## 2020-12-21 PROCEDURE — 3079F DIAST BP 80-89 MM HG: CPT | Performed by: FAMILY MEDICINE

## 2020-12-21 PROCEDURE — 99396 PREV VISIT EST AGE 40-64: CPT | Performed by: FAMILY MEDICINE

## 2020-12-21 PROCEDURE — 90471 IMMUNIZATION ADMIN: CPT | Performed by: FAMILY MEDICINE

## 2020-12-21 PROCEDURE — 3008F BODY MASS INDEX DOCD: CPT | Performed by: FAMILY MEDICINE

## 2020-12-21 PROCEDURE — 90732 PPSV23 VACC 2 YRS+ SUBQ/IM: CPT | Performed by: FAMILY MEDICINE

## 2020-12-21 NOTE — PROGRESS NOTES
HPI:   Margarette Yang is a 45year old female who presents for a complete physical exam.     Wt Readings from Last 6 Encounters:  12/21/20 : 158 lb (71.7 kg)  07/20/20 : 158 lb (71.7 kg)  01/02/20 : 150 lb (68 kg)  01/02/20 : 155 lb (70.3 kg)  12/30/19 Oral Tab Take 1 tablet (40 mg total) by mouth nightly. 90 tablet 3   • XARELTO 20 MG Oral Tab TAKE 1 TABLET BY MOUTH DAILY 90 tablet 3   • amLODIPine Besylate (NORVASC) 5 MG Oral Tab Take 1 tablet (5 mg total) by mouth daily.  90 tablet 3   • Naratriptan HC OTHER SURGICAL HISTORY  8/2005    ICD implantation      Family History   Problem Relation Age of Onset   • Diabetes Other         grandmother    • Cancer Other         grandmother    • Stroke Other         grandmother    • Heart Disease Other         grand suspicious lesions  GI: good BS's,no masses, HSM or tenderness  CHEST: no chest tenderness  MUSCULOSKELETAL: back is not tender,FROM of the back  EXTREMITIES: no cyanosis, clubbing or edema  NEURO: cranial nerves are intact,motor and sensory are intact

## 2020-12-22 ENCOUNTER — LABORATORY ENCOUNTER (OUTPATIENT)
Dept: LAB | Age: 50
End: 2020-12-22
Attending: FAMILY MEDICINE
Payer: COMMERCIAL

## 2020-12-22 DIAGNOSIS — G44.89 OTHER HEADACHE SYNDROME: ICD-10-CM

## 2020-12-22 DIAGNOSIS — R79.0 LOW FERRITIN: ICD-10-CM

## 2020-12-22 DIAGNOSIS — U07.1 COVID-19 VIRUS INFECTION: ICD-10-CM

## 2020-12-22 PROCEDURE — 82728 ASSAY OF FERRITIN: CPT

## 2020-12-22 PROCEDURE — 82607 VITAMIN B-12: CPT

## 2020-12-22 PROCEDURE — 85025 COMPLETE CBC W/AUTO DIFF WBC: CPT

## 2020-12-22 PROCEDURE — 86769 SARS-COV-2 COVID-19 ANTIBODY: CPT

## 2020-12-22 PROCEDURE — 36415 COLL VENOUS BLD VENIPUNCTURE: CPT

## 2020-12-22 PROCEDURE — 82746 ASSAY OF FOLIC ACID SERUM: CPT

## 2020-12-30 DIAGNOSIS — G89.29 CHRONIC INTRACTABLE HEADACHE, UNSPECIFIED HEADACHE TYPE: ICD-10-CM

## 2020-12-30 DIAGNOSIS — R51.9 CHRONIC INTRACTABLE HEADACHE, UNSPECIFIED HEADACHE TYPE: ICD-10-CM

## 2020-12-30 RX ORDER — ZONISAMIDE 100 MG/1
100 CAPSULE ORAL DAILY
Qty: 5 CAPSULE | Refills: 0 | Status: SHIPPED | OUTPATIENT
Start: 2020-12-30 | End: 2020-12-30

## 2020-12-30 RX ORDER — ZONISAMIDE 100 MG/1
100 CAPSULE ORAL DAILY
Qty: 90 CAPSULE | Refills: 3 | Status: SHIPPED | OUTPATIENT
Start: 2020-12-30 | End: 2021-07-29

## 2020-12-30 NOTE — TELEPHONE ENCOUNTER
Patient states that the pharmacy never received her request for Zonisamide capsules. Short 5 day supply sent to local pharmacy as requested by patient. Will have provider sign mail order prescription.

## 2020-12-30 NOTE — TELEPHONE ENCOUNTER
[facility] never received refill request for zonisamide. Pt requesting we resend 90-day supply to them. Pt made appt for 1/15/21.     Pt will also need 5 tablets for local pharmacy, will enter in a separate TE.

## 2020-12-30 NOTE — TELEPHONE ENCOUNTER
Pt needs rx for 5 zonasimide tablets sent to her local pharmacy to hold her over until she receives mail order refill. Please send to [facility] in [place] [address].

## 2021-01-15 ENCOUNTER — OFFICE VISIT (OUTPATIENT)
Dept: NEUROLOGY | Facility: CLINIC | Age: 51
End: 2021-01-15
Payer: COMMERCIAL

## 2021-01-15 VITALS
HEIGHT: 66 IN | RESPIRATION RATE: 18 BRPM | HEART RATE: 66 BPM | BODY MASS INDEX: 24.11 KG/M2 | DIASTOLIC BLOOD PRESSURE: 60 MMHG | WEIGHT: 150 LBS | SYSTOLIC BLOOD PRESSURE: 100 MMHG

## 2021-01-15 DIAGNOSIS — R51.9 CHRONIC INTRACTABLE HEADACHE, UNSPECIFIED HEADACHE TYPE: ICD-10-CM

## 2021-01-15 DIAGNOSIS — I77.6 VASCULITIS (HCC): ICD-10-CM

## 2021-01-15 DIAGNOSIS — I42.9 CARDIOMYOPATHY, UNSPECIFIED TYPE (HCC): ICD-10-CM

## 2021-01-15 DIAGNOSIS — G43.011 INTRACTABLE MIGRAINE WITHOUT AURA AND WITH STATUS MIGRAINOSUS: ICD-10-CM

## 2021-01-15 DIAGNOSIS — G89.29 CHRONIC INTRACTABLE HEADACHE, UNSPECIFIED HEADACHE TYPE: ICD-10-CM

## 2021-01-15 DIAGNOSIS — G43.011 INTRACTABLE MIGRAINE WITHOUT AURA AND WITH STATUS MIGRAINOSUS: Primary | ICD-10-CM

## 2021-01-15 PROCEDURE — 3074F SYST BP LT 130 MM HG: CPT | Performed by: OTHER

## 2021-01-15 PROCEDURE — 99214 OFFICE O/P EST MOD 30 MIN: CPT | Performed by: OTHER

## 2021-01-15 PROCEDURE — 3078F DIAST BP <80 MM HG: CPT | Performed by: OTHER

## 2021-01-15 PROCEDURE — 3008F BODY MASS INDEX DOCD: CPT | Performed by: OTHER

## 2021-01-15 RX ORDER — ERENUMAB-AOOE 70 MG/ML
140 INJECTION SUBCUTANEOUS
Qty: 1 ML | Refills: 0 | Status: SHIPPED | COMMUNITY
Start: 2021-01-15 | End: 2021-02-12

## 2021-01-15 RX ORDER — BUTALBITAL, ACETAMINOPHEN AND CAFFEINE 50; 325; 40 MG/1; MG/1; MG/1
TABLET ORAL
Qty: 60 TABLET | Refills: 0 | Status: SHIPPED | OUTPATIENT
Start: 2021-01-15 | End: 2021-04-29

## 2021-01-15 NOTE — PROGRESS NOTES
[facility] with [address] [facility]  2/17/1970  Primary Care Provider:  Cyndi Salinas DO    1/15/2021  Accompanied visit:      (x) No.      45year old yo patient being seen for:  martinez once after 4 hours- ONLY 2 IN 24 HOUR PERIOD MAX. This is a 30 day supply. , Disp: 12 tablet, Rfl: 5  •  Na Sulfate-K Sulfate-Mg Sulf (SUPREP BOWEL PREP KIT) 17.5-3.13-1.6 GM/177ML Oral Solution, Take as directed by physician.  (Patient not taking: Reported time with patiern or authorized Fam member--non F2F  ( x ) other records reviewed --non F2F including consultations  (  ) Fam meetings - patient not present --non F2F  (  ) Independent Historian obtained    Non Face to Face CPT code 68084/58103 applies as

## 2021-01-15 NOTE — TELEPHONE ENCOUNTER
Patient requests during [facility] education that she needs refill of FIOROCET    Medication: Bubalbital-APAP-Caffeine -40 oral tab  Take 1-2 tab PO Q 4-6 hours PRN for headache    Date of last refill: 10/26/2020 (#60/0)  [place]

## 2021-01-15 NOTE — PROGRESS NOTES
Patient to start [address] [place] [address] for migraines. While in office, patient completed an [employer] questionnaire. Rx sent directly to preferred pharmacy. RN demonstrated injection of 70 mg/ml technique to patient using Demonstration Kit.  Patient then administered 70

## 2021-01-18 ENCOUNTER — TELEPHONE (OUTPATIENT)
Dept: NEUROLOGY | Facility: CLINIC | Age: 51
End: 2021-01-18

## 2021-01-18 DIAGNOSIS — G43.701 CHRONIC MIGRAINE WITHOUT AURA WITH STATUS MIGRAINOSUS, NOT INTRACTABLE: Primary | ICD-10-CM

## 2021-02-04 ENCOUNTER — TELEPHONE (OUTPATIENT)
Dept: CARDIOLOGY | Age: 51
End: 2021-02-04

## 2021-02-23 RX ORDER — ERENUMAB-AOOE 70 MG/ML
70 INJECTION SUBCUTANEOUS
Qty: 1 ML | Refills: 5 | Status: SHIPPED | OUTPATIENT
Start: 2021-02-23 | End: 2021-02-23 | Stop reason: CLARIF

## 2021-02-23 RX ORDER — ERENUMAB-AOOE 140 MG/ML
140 INJECTION, SOLUTION SUBCUTANEOUS
Qty: 1 ML | Refills: 11 | Status: SHIPPED | OUTPATIENT
Start: 2021-02-23 | End: 2021-04-29

## 2021-02-23 NOTE — TELEPHONE ENCOUNTER
Prescription sent to pharmacy. Patient received 2 doses in office. Prior authorization on file. Home

## 2021-02-23 NOTE — TELEPHONE ENCOUNTER
Patient called, advised that she went to  her [employer] and that Yamini stated they did not have any orders. Told patient that PA was approved shortly after her appointment.     Can we please check to see which pharmacy should be filling this me

## 2021-02-24 ENCOUNTER — ANCILLARY PROCEDURE (OUTPATIENT)
Dept: CARDIOLOGY | Age: 51
End: 2021-02-24
Attending: INTERNAL MEDICINE

## 2021-02-24 ENCOUNTER — OFFICE VISIT (OUTPATIENT)
Dept: CARDIOLOGY | Age: 51
End: 2021-02-24

## 2021-02-24 VITALS
DIASTOLIC BLOOD PRESSURE: 66 MMHG | HEIGHT: 67 IN | WEIGHT: 150 LBS | SYSTOLIC BLOOD PRESSURE: 104 MMHG | HEART RATE: 68 BPM | BODY MASS INDEX: 23.54 KG/M2

## 2021-02-24 DIAGNOSIS — Z95.810 ICD (IMPLANTABLE CARDIOVERTER-DEFIBRILLATOR) IN PLACE: Primary | ICD-10-CM

## 2021-02-24 DIAGNOSIS — I77.71 CAROTID ARTERY DISSECTION (CMD): ICD-10-CM

## 2021-02-24 DIAGNOSIS — Z95.810 ICD (IMPLANTABLE CARDIOVERTER-DEFIBRILLATOR) IN PLACE: ICD-10-CM

## 2021-02-24 PROCEDURE — 99213 OFFICE O/P EST LOW 20 MIN: CPT | Performed by: INTERNAL MEDICINE

## 2021-02-24 PROCEDURE — 93282 PRGRMG EVAL IMPLANTABLE DFB: CPT | Performed by: INTERNAL MEDICINE

## 2021-02-24 RX ORDER — ERENUMAB-AOOE 140 MG/ML
140 INJECTION, SOLUTION SUBCUTANEOUS
COMMUNITY
Start: 2021-02-23 | End: 2021-06-21 | Stop reason: ALTCHOICE

## 2021-02-24 RX ORDER — ATORVASTATIN CALCIUM 40 MG/1
40 TABLET, FILM COATED ORAL DAILY
Qty: 90 TABLET | Refills: 3 | Status: SHIPPED | OUTPATIENT
Start: 2021-02-24

## 2021-02-24 RX ORDER — AMLODIPINE BESYLATE 5 MG/1
5 TABLET ORAL DAILY
Qty: 90 TABLET | Refills: 3 | Status: SHIPPED | OUTPATIENT
Start: 2021-02-24

## 2021-02-24 ASSESSMENT — PATIENT HEALTH QUESTIONNAIRE - PHQ9
CLINICAL INTERPRETATION OF PHQ2 SCORE: NO FURTHER SCREENING NEEDED
1. LITTLE INTEREST OR PLEASURE IN DOING THINGS: NOT AT ALL
SUM OF ALL RESPONSES TO PHQ9 QUESTIONS 1 AND 2: 0
CLINICAL INTERPRETATION OF PHQ9 SCORE: NO FURTHER SCREENING NEEDED
2. FEELING DOWN, DEPRESSED OR HOPELESS: NOT AT ALL
SUM OF ALL RESPONSES TO PHQ9 QUESTIONS 1 AND 2: 0

## 2021-03-18 PROCEDURE — 93296 REM INTERROG EVL PM/IDS: CPT | Performed by: INTERNAL MEDICINE

## 2021-03-26 ENCOUNTER — PATIENT MESSAGE (OUTPATIENT)
Dept: FAMILY MEDICINE CLINIC | Facility: CLINIC | Age: 51
End: 2021-03-26

## 2021-03-26 NOTE — TELEPHONE ENCOUNTER
From: Burgess Lynn  To: Markus Perales DO  Sent: 3/26/2021 9:24 AM CDT  Subject: Non-Urgent Medical Question    Good morning    I wanted to give you my vaccination record card to update my chart.      I also need advice as my daughter just tested positi

## 2021-04-07 ENCOUNTER — OFFICE VISIT (OUTPATIENT)
Dept: NEUROLOGY | Facility: CLINIC | Age: 51
End: 2021-04-07
Payer: COMMERCIAL

## 2021-04-07 ENCOUNTER — TELEPHONE (OUTPATIENT)
Dept: NEUROLOGY | Facility: CLINIC | Age: 51
End: 2021-04-07

## 2021-04-07 VITALS
RESPIRATION RATE: 16 BRPM | WEIGHT: 150 LBS | HEIGHT: 66 IN | DIASTOLIC BLOOD PRESSURE: 62 MMHG | BODY MASS INDEX: 24.11 KG/M2 | SYSTOLIC BLOOD PRESSURE: 102 MMHG | HEART RATE: 68 BPM

## 2021-04-07 DIAGNOSIS — I77.3 FIBROMUSCULAR DYSPLASIA OF BOTH CAROTID ARTERIES (HCC): ICD-10-CM

## 2021-04-07 DIAGNOSIS — R51.9 CHRONIC INTRACTABLE HEADACHE, UNSPECIFIED HEADACHE TYPE: Primary | ICD-10-CM

## 2021-04-07 DIAGNOSIS — G89.29 CHRONIC INTRACTABLE HEADACHE, UNSPECIFIED HEADACHE TYPE: Primary | ICD-10-CM

## 2021-04-07 DIAGNOSIS — G43.701 CHRONIC MIGRAINE WITHOUT AURA WITH STATUS MIGRAINOSUS, NOT INTRACTABLE: Primary | ICD-10-CM

## 2021-04-07 DIAGNOSIS — M54.81 OCCIPITAL NEURALGIA OF RIGHT SIDE: ICD-10-CM

## 2021-04-07 PROCEDURE — 3008F BODY MASS INDEX DOCD: CPT | Performed by: OTHER

## 2021-04-07 PROCEDURE — 99214 OFFICE O/P EST MOD 30 MIN: CPT | Performed by: OTHER

## 2021-04-07 PROCEDURE — 3074F SYST BP LT 130 MM HG: CPT | Performed by: OTHER

## 2021-04-07 PROCEDURE — 3078F DIAST BP <80 MM HG: CPT | Performed by: OTHER

## 2021-04-07 NOTE — TELEPHONE ENCOUNTER
Botox Questionnaire:      Year of initial migraine onset? Prior to 2013, patient states since she was a child. Does patient have more than 15 headache days a month? yes   If yes, how many days monthly? 20  Do headaches last 4 hours or more per day? Ineffective. 4. Diclofenac-Misoprostol (ARTHROTEC) 75-0.2 MG Oral Tab EC. Take 1 tablet by mouth 2 (two) times daily. 2013 2014 2016. Ineffective.     Explained prior authorization timeline and process, including that her insurance may require trial and fa

## 2021-04-07 NOTE — PROGRESS NOTES
[facility] with [address] [facility]  2/17/1970  Primary Care Provider:  Caitlyn Mcdermott DO    4/7/2021  Accompanied visit:      (x) No.      45year old yo patient being seen for:  headac hours- ONLY 2 IN 24 HOUR PERIOD MAX. This is a 30 day supply. , Disp: 12 tablet, Rfl: 5  PRN:     Allergies:    Augmentin [Amoxicil*    HIVES  Keflex [Cephalexin *    HIVES         EXAM:  /62 (BP Location: Left arm, Patient Position: Sitting, Cuff Siz records reviewed --non F2F including consultations  (  ) Fam meetings - patient not present --non F2F  (  ) Independent Historian obtained    Non Face to Face CPT code 70858/21993 applies as documented above    PROCEDURE DONE     (   ) see notes      After

## 2021-04-16 NOTE — TELEPHONE ENCOUNTER
Botox approved as buy and bill.     Approval #[id number] 6 visits from 4/8/21-10/8/22    Please order 200 units for migraine and once Botox available call patient for 1st appointment

## 2021-04-19 ENCOUNTER — HOSPITAL ENCOUNTER (OUTPATIENT)
Dept: CT IMAGING | Facility: HOSPITAL | Age: 51
Discharge: HOME | End: 2021-04-19
Attending: Other
Payer: COMMERCIAL

## 2021-04-19 DIAGNOSIS — G43.701 CHRONIC MIGRAINE WITHOUT AURA WITH STATUS MIGRAINOSUS, NOT INTRACTABLE: ICD-10-CM

## 2021-04-19 DIAGNOSIS — I77.3 FIBROMUSCULAR DYSPLASIA OF BOTH CAROTID ARTERIES (HCC): ICD-10-CM

## 2021-04-19 PROCEDURE — 82565 ASSAY OF CREATININE: CPT

## 2021-04-19 PROCEDURE — 70498 CT ANGIOGRAPHY NECK: CPT | Performed by: OTHER

## 2021-04-19 PROCEDURE — 70496 CT ANGIOGRAPHY HEAD: CPT | Performed by: OTHER

## 2021-04-19 NOTE — TELEPHONE ENCOUNTER
RN provided Darcie Mccloud with information with Botox. RN provided the  with the information to make the appt for the 1st Botox appt.

## 2021-04-29 ENCOUNTER — OFFICE VISIT (OUTPATIENT)
Dept: NEUROLOGY | Facility: CLINIC | Age: 51
End: 2021-04-29
Payer: COMMERCIAL

## 2021-04-29 VITALS — BODY MASS INDEX: 24.11 KG/M2 | WEIGHT: 150 LBS | RESPIRATION RATE: 16 BRPM | HEIGHT: 66 IN

## 2021-04-29 DIAGNOSIS — G43.011 INTRACTABLE MIGRAINE WITHOUT AURA AND WITH STATUS MIGRAINOSUS: ICD-10-CM

## 2021-04-29 DIAGNOSIS — G43.701 CHRONIC MIGRAINE WITHOUT AURA WITH STATUS MIGRAINOSUS, NOT INTRACTABLE: ICD-10-CM

## 2021-04-29 DIAGNOSIS — G89.29 CHRONIC INTRACTABLE HEADACHE, UNSPECIFIED HEADACHE TYPE: Primary | ICD-10-CM

## 2021-04-29 DIAGNOSIS — R51.9 CHRONIC INTRACTABLE HEADACHE, UNSPECIFIED HEADACHE TYPE: Primary | ICD-10-CM

## 2021-04-29 PROCEDURE — 3008F BODY MASS INDEX DOCD: CPT | Performed by: OTHER

## 2021-04-29 PROCEDURE — 64615 CHEMODENERV MUSC MIGRAINE: CPT | Performed by: OTHER

## 2021-04-29 RX ORDER — BUTALBITAL, ACETAMINOPHEN AND CAFFEINE 50; 325; 40 MG/1; MG/1; MG/1
TABLET ORAL
Qty: 60 TABLET | Refills: 1 | Status: SHIPPED | OUTPATIENT
Start: 2021-04-29 | End: 2021-07-29

## 2021-04-29 RX ORDER — NARATRIPTAN 1 MG/1
TABLET ORAL
Qty: 12 TABLET | Refills: 5 | Status: SHIPPED | OUTPATIENT
Start: 2021-04-29 | End: 2021-07-29

## 2021-04-29 NOTE — PROGRESS NOTES
[facility] with [facility]  4/29/2021  CHRONIC MIGRAINE - BOTOX Injection  CPT: 54983    Lacretia Sneha is a(n) 45year old female with chronic migraine. Patient is here for Botox injection. following concentration:  5 units per 0.1 ml.      Muscles, number of sites, units used and Side injected were as follows:                                                 Units used     Side  Procerus   5 units        center  Corrugators 2 sites  10 units

## 2021-05-03 ENCOUNTER — TELEPHONE (OUTPATIENT)
Dept: NEUROLOGY | Facility: CLINIC | Age: 51
End: 2021-05-03

## 2021-05-09 NOTE — TELEPHONE ENCOUNTER
Ok to keep using Tramadol but call in MDP    Dr Erlinda Pineda
S: Calling with intense headache    B: LOV 9/1/17 with notes:  IMPRESSION & PLANS:  Dissection of coronary artery  (primary encounter diagnosis)  Carotid artery dissection (HCC)  Neck pain  Intractable episodic paroxysmal hemicrania     Discussion on the c
Spoke with the patient, she just got over a cold but the headache started as describe and at same time, is again dealing with a sore throat    Advised ok to take Tramadol every 4-6 hours for HA but will hold off on MDP given the likelihood of infection  Ca
warm

## 2021-05-24 ENCOUNTER — TELEPHONE (OUTPATIENT)
Dept: CARDIOLOGY | Age: 51
End: 2021-05-24

## 2021-05-24 ENCOUNTER — HOSPITAL ENCOUNTER (OUTPATIENT)
Dept: ULTRASOUND IMAGING | Age: 51
Discharge: HOME | End: 2021-05-24
Attending: INTERNAL MEDICINE
Payer: COMMERCIAL

## 2021-05-24 DIAGNOSIS — M79.605 LOWER EXTREMITY PAIN, BILATERAL: ICD-10-CM

## 2021-05-24 DIAGNOSIS — Z86.718 HISTORY OF DVT (DEEP VEIN THROMBOSIS): ICD-10-CM

## 2021-05-24 DIAGNOSIS — M79.604 LOWER EXTREMITY PAIN, BILATERAL: ICD-10-CM

## 2021-05-24 DIAGNOSIS — Z86.718 HISTORY OF DVT (DEEP VEIN THROMBOSIS): Primary | ICD-10-CM

## 2021-05-24 PROCEDURE — 93970 EXTREMITY STUDY: CPT | Performed by: INTERNAL MEDICINE

## 2021-06-16 ENCOUNTER — OFFICE VISIT (OUTPATIENT)
Dept: FAMILY MEDICINE CLINIC | Facility: CLINIC | Age: 51
End: 2021-06-16
Payer: COMMERCIAL

## 2021-06-16 VITALS
SYSTOLIC BLOOD PRESSURE: 108 MMHG | DIASTOLIC BLOOD PRESSURE: 64 MMHG | BODY MASS INDEX: 24.27 KG/M2 | HEIGHT: 66 IN | OXYGEN SATURATION: 99 % | HEART RATE: 63 BPM | RESPIRATION RATE: 16 BRPM | WEIGHT: 151 LBS

## 2021-06-16 DIAGNOSIS — S86.812A STRAIN OF LEFT TIBIALIS ANTERIOR MUSCLE, INITIAL ENCOUNTER: ICD-10-CM

## 2021-06-16 DIAGNOSIS — M54.50 LUMBAR PAIN: Primary | ICD-10-CM

## 2021-06-16 DIAGNOSIS — M77.11 RIGHT TENNIS ELBOW: ICD-10-CM

## 2021-06-16 DIAGNOSIS — M76.32 ILIOTIBIAL BAND SYNDROME OF LEFT SIDE: ICD-10-CM

## 2021-06-16 PROCEDURE — 3008F BODY MASS INDEX DOCD: CPT | Performed by: FAMILY MEDICINE

## 2021-06-16 PROCEDURE — 99214 OFFICE O/P EST MOD 30 MIN: CPT | Performed by: FAMILY MEDICINE

## 2021-06-16 PROCEDURE — 3078F DIAST BP <80 MM HG: CPT | Performed by: FAMILY MEDICINE

## 2021-06-16 PROCEDURE — 3074F SYST BP LT 130 MM HG: CPT | Performed by: FAMILY MEDICINE

## 2021-06-16 RX ORDER — ERENUMAB-AOOE 140 MG/ML
140 INJECTION, SOLUTION SUBCUTANEOUS
COMMUNITY
Start: 2021-02-23 | End: 2021-07-29

## 2021-06-16 NOTE — PROGRESS NOTES
HPI:   Heather Chen is a 45year old female who presents with back pain and left leg pain     Left sided back pain since April   No injury or fall   Overall better / no radiation /   Pt is walking for exercise     Left leg pain started at the same dg Pulmonary embolism (HCC)    • Secondary hypercoagulability disorder (HCC)     Protein S deficiency, W/heterzygous MTHFR mutation.     • Spastic colon    • Stented coronary artery    • Thyromegaly    • Tricuspid valve disorder    • Vasculitis (HCC)     Invol blurred vision or double vision  HEENT: denies nasal congestion, sinus pain or ST  LUNGS: denies shortness of breath with exertion  CARDIOVASCULAR: denies chest pain on exertion  GI: denies abdominal pain,denies heartburn  MUSCULOSKELETAL: denies back pain

## 2021-06-17 ENCOUNTER — TELEPHONE (OUTPATIENT)
Dept: CARDIOLOGY | Age: 51
End: 2021-06-17

## 2021-06-18 ENCOUNTER — TELEPHONE (OUTPATIENT)
Dept: CARDIOLOGY | Age: 51
End: 2021-06-18

## 2021-06-21 RX ORDER — NARATRIPTAN 1 MG/1
1 TABLET ORAL
COMMUNITY

## 2021-06-21 ASSESSMENT — ACTIVITIES OF DAILY LIVING (ADL)
ADL_SCORE: 12
SENSORY_SUPPORT_DEVICES: EYEGLASSES
ADL_BEFORE_ADMISSION: INDEPENDENT

## 2021-06-23 ENCOUNTER — DOCUMENTATION (OUTPATIENT)
Dept: CARDIOLOGY | Age: 51
End: 2021-06-23

## 2021-06-24 PROCEDURE — 93296 REM INTERROG EVL PM/IDS: CPT | Performed by: INTERNAL MEDICINE

## 2021-06-24 PROCEDURE — 93295 DEV INTERROG REMOTE 1/2/MLT: CPT | Performed by: INTERNAL MEDICINE

## 2021-06-28 ENCOUNTER — ANCILLARY ORDERS (OUTPATIENT)
Dept: CARDIOLOGY | Age: 51
End: 2021-06-28

## 2021-06-28 ENCOUNTER — ANCILLARY PROCEDURE (OUTPATIENT)
Dept: CARDIOLOGY | Age: 51
End: 2021-06-28
Attending: INTERNAL MEDICINE

## 2021-06-28 DIAGNOSIS — Z95.810 ICD (IMPLANTABLE CARDIOVERTER-DEFIBRILLATOR) IN PLACE: ICD-10-CM

## 2021-06-28 PROCEDURE — X1114 CARDIAC DEVICE HOME CHECK - REMOTE UNSCHEDULED: HCPCS | Performed by: INTERNAL MEDICINE

## 2021-07-06 ENCOUNTER — HOSPITAL ENCOUNTER (OUTPATIENT)
Dept: LAB | Age: 51
Discharge: HOME | End: 2021-07-06

## 2021-07-06 ENCOUNTER — HOSPITAL ENCOUNTER (OUTPATIENT)
Dept: GENERAL RADIOLOGY | Age: 51
Discharge: HOME | End: 2021-07-06

## 2021-07-06 DIAGNOSIS — Z95.810 AUTOMATIC IMPLANTABLE CARDIAC DEFIBRILLATOR IN SITU: ICD-10-CM

## 2021-07-06 DIAGNOSIS — Z95.810 AUTOMATIC IMPLANTABLE CARDIAC DEFIBRILLATOR IN SITU: Primary | ICD-10-CM

## 2021-07-06 DIAGNOSIS — Z95.810 ICD (IMPLANTABLE CARDIOVERTER-DEFIBRILLATOR) IN PLACE: ICD-10-CM

## 2021-07-06 LAB
ATRIAL RATE (BPM): 56
P AXIS (DEGREES): 66
PR-INTERVAL (MSEC): 152
QRS-INTERVAL (MSEC): 76
QT-INTERVAL (MSEC): 464
QTC: 448
R AXIS (DEGREES): 64
REPORT TEXT: NORMAL
T AXIS (DEGREES): 60
VENTRICULAR RATE EKG/MIN (BPM): 56

## 2021-07-06 PROCEDURE — 71046 X-RAY EXAM CHEST 2 VIEWS: CPT

## 2021-07-06 PROCEDURE — 93005 ELECTROCARDIOGRAM TRACING: CPT

## 2021-07-13 ENCOUNTER — HOSPITAL ENCOUNTER (INPATIENT)
Age: 51
LOS: 2 days | Discharge: HOME | DRG: 743 | End: 2021-07-15
Attending: OBSTETRICS & GYNECOLOGY | Admitting: OBSTETRICS & GYNECOLOGY

## 2021-07-13 ENCOUNTER — ANESTHESIA EVENT (OUTPATIENT)
Dept: SURGERY | Age: 51
DRG: 743 | End: 2021-07-13

## 2021-07-13 ENCOUNTER — ANESTHESIA (OUTPATIENT)
Dept: SURGERY | Age: 51
DRG: 743 | End: 2021-07-13

## 2021-07-13 DIAGNOSIS — Z95.810 ICD (IMPLANTABLE CARDIOVERTER-DEFIBRILLATOR) IN PLACE: ICD-10-CM

## 2021-07-13 DIAGNOSIS — I77.71 CAROTID ARTERY DISSECTION (CMD): Primary | ICD-10-CM

## 2021-07-13 LAB
ABO + RH BLD: NORMAL
ANION GAP SERPL CALC-SCNC: 8 MMOL/L (ref 10–20)
APTT PPP: 25 SEC (ref 22–30)
BLD GP AB SCN SERPL QL GEL: NEGATIVE
BUN SERPL-MCNC: 6 MG/DL (ref 6–20)
BUN/CREAT SERPL: 8 (ref 7–25)
CALCIUM SERPL-MCNC: 8.1 MG/DL (ref 8.4–10.2)
CHLORIDE SERPL-SCNC: 112 MMOL/L (ref 98–107)
CO2 SERPL-SCNC: 27 MMOL/L (ref 21–32)
CREAT SERPL-MCNC: 0.71 MG/DL (ref 0.51–0.95)
FASTING DURATION TIME PATIENT: ABNORMAL H
GFR SERPLBLD BASED ON 1.73 SQ M-ARVRAT: >90 ML/MIN/1.73M2
GLUCOSE SERPL-MCNC: 92 MG/DL (ref 65–99)
HCT VFR BLD CALC: 39.7 % (ref 36–46.5)
HGB BLD-MCNC: 12.6 G/DL (ref 12–15.5)
INR PPP: 1
POTASSIUM SERPL-SCNC: 3.8 MMOL/L (ref 3.4–5.1)
PROTHROMBIN TIME: 10.3 SEC (ref 9.7–11.8)
SODIUM SERPL-SCNC: 143 MMOL/L (ref 135–145)
TYPE AND SCREEN EXPIRATION DATE: NORMAL

## 2021-07-13 PROCEDURE — 10002807 HB RX 258: Performed by: ANESTHESIOLOGY

## 2021-07-13 PROCEDURE — 0UT20ZZ RESECTION OF BILATERAL OVARIES, OPEN APPROACH: ICD-10-PCS | Performed by: OBSTETRICS & GYNECOLOGY

## 2021-07-13 PROCEDURE — 0UT90ZL RESECTION OF UTERUS, SUPRACERVICAL, OPEN APPROACH: ICD-10-PCS | Performed by: OBSTETRICS & GYNECOLOGY

## 2021-07-13 PROCEDURE — 10002801 HB RX 250 W/O HCPCS: Performed by: OBSTETRICS & GYNECOLOGY

## 2021-07-13 PROCEDURE — 10002800 HB RX 250 W HCPCS: Performed by: OBSTETRICS & GYNECOLOGY

## 2021-07-13 PROCEDURE — 86850 RBC ANTIBODY SCREEN: CPT | Performed by: OBSTETRICS & GYNECOLOGY

## 2021-07-13 PROCEDURE — 85730 THROMBOPLASTIN TIME PARTIAL: CPT | Performed by: OBSTETRICS & GYNECOLOGY

## 2021-07-13 PROCEDURE — 10002800 HB RX 250 W HCPCS: Performed by: GENERAL ACUTE CARE HOSPITAL

## 2021-07-13 PROCEDURE — 10002016 HB COUNTER INCENTIVE SPIROMETRY

## 2021-07-13 PROCEDURE — 10004451 HB PACU RECOVERY 1ST 30 MINUTES: Performed by: OBSTETRICS & GYNECOLOGY

## 2021-07-13 PROCEDURE — 10000002 HB ROOM CHARGE MED SURG

## 2021-07-13 PROCEDURE — 10004651 HB RX, NO CHARGE ITEM: Performed by: OBSTETRICS & GYNECOLOGY

## 2021-07-13 PROCEDURE — 13001086 HB INCENTIVE SPIROMETER W INSTRUCT

## 2021-07-13 PROCEDURE — 85610 PROTHROMBIN TIME: CPT | Performed by: OBSTETRICS & GYNECOLOGY

## 2021-07-13 PROCEDURE — 13000003 HB ANESTHESIA  GENERAL EA ADD MINUTE: Performed by: OBSTETRICS & GYNECOLOGY

## 2021-07-13 PROCEDURE — 10002801 HB RX 250 W/O HCPCS: Performed by: GENERAL ACUTE CARE HOSPITAL

## 2021-07-13 PROCEDURE — 80048 BASIC METABOLIC PNL TOTAL CA: CPT | Performed by: OBSTETRICS & GYNECOLOGY

## 2021-07-13 PROCEDURE — 13000037 HB COMPLEX CASE EACH ADD MINUTE: Performed by: OBSTETRICS & GYNECOLOGY

## 2021-07-13 PROCEDURE — 10006023 HB SUPPLY 272: Performed by: OBSTETRICS & GYNECOLOGY

## 2021-07-13 PROCEDURE — 10002803 HB RX 637: Performed by: OBSTETRICS & GYNECOLOGY

## 2021-07-13 PROCEDURE — 0UT70ZZ RESECTION OF BILATERAL FALLOPIAN TUBES, OPEN APPROACH: ICD-10-PCS | Performed by: OBSTETRICS & GYNECOLOGY

## 2021-07-13 PROCEDURE — 10004452 HB PACU ADDL 30 MINUTES: Performed by: OBSTETRICS & GYNECOLOGY

## 2021-07-13 PROCEDURE — 13000036 HB COMPLEX  CASE S/U + 1ST 15 MIN: Performed by: OBSTETRICS & GYNECOLOGY

## 2021-07-13 PROCEDURE — 10002803 HB RX 637: Performed by: ANESTHESIOLOGY

## 2021-07-13 PROCEDURE — 36415 COLL VENOUS BLD VENIPUNCTURE: CPT | Performed by: OBSTETRICS & GYNECOLOGY

## 2021-07-13 PROCEDURE — 10002800 HB RX 250 W HCPCS

## 2021-07-13 PROCEDURE — 13000002 HB ANESTHESIA  GENERAL  S/U + 1ST 15 MIN: Performed by: OBSTETRICS & GYNECOLOGY

## 2021-07-13 PROCEDURE — 10002807 HB RX 258: Performed by: OBSTETRICS & GYNECOLOGY

## 2021-07-13 PROCEDURE — 10002807 HB RX 258: Performed by: GENERAL ACUTE CARE HOSPITAL

## 2021-07-13 PROCEDURE — 85014 HEMATOCRIT: CPT | Performed by: OBSTETRICS & GYNECOLOGY

## 2021-07-13 PROCEDURE — 88307 TISSUE EXAM BY PATHOLOGIST: CPT | Performed by: OBSTETRICS & GYNECOLOGY

## 2021-07-13 RX ORDER — CHLORHEXIDINE GLUCONATE ORAL RINSE 1.2 MG/ML
15 SOLUTION DENTAL EVERY 12 HOURS SCHEDULED
Status: DISCONTINUED | OUTPATIENT
Start: 2021-07-13 | End: 2021-07-15 | Stop reason: HOSPADM

## 2021-07-13 RX ORDER — CIPROFLOXACIN 2 MG/ML
400 INJECTION, SOLUTION INTRAVENOUS
Status: COMPLETED | OUTPATIENT
Start: 2021-07-13 | End: 2021-07-13

## 2021-07-13 RX ORDER — HUMAN INSULIN 100 [IU]/ML
INJECTION, SOLUTION SUBCUTANEOUS
Status: DISCONTINUED | OUTPATIENT
Start: 2021-07-13 | End: 2021-07-13 | Stop reason: HOSPADM

## 2021-07-13 RX ORDER — ENOXAPARIN SODIUM 100 MG/ML
40 INJECTION SUBCUTANEOUS DAILY
Status: DISCONTINUED | OUTPATIENT
Start: 2021-07-13 | End: 2021-07-15 | Stop reason: HOSPADM

## 2021-07-13 RX ORDER — 0.9 % SODIUM CHLORIDE 0.9 %
2 VIAL (ML) INJECTION EVERY 12 HOURS SCHEDULED
Status: DISCONTINUED | OUTPATIENT
Start: 2021-07-13 | End: 2021-07-13 | Stop reason: HOSPADM

## 2021-07-13 RX ORDER — MORPHINE SULFATE/0.9% NACL/PF 30 MG/30ML
PATIENT CONTROLLED ANALGESIA SYRINGE INTRAVENOUS CONTINUOUS
Status: DISCONTINUED | OUTPATIENT
Start: 2021-07-13 | End: 2021-07-14

## 2021-07-13 RX ORDER — DEXAMETHASONE SODIUM PHOSPHATE 4 MG/ML
INJECTION, SOLUTION INTRA-ARTICULAR; INTRALESIONAL; INTRAMUSCULAR; INTRAVENOUS; SOFT TISSUE PRN
Status: DISCONTINUED | OUTPATIENT
Start: 2021-07-13 | End: 2021-07-13

## 2021-07-13 RX ORDER — MORPHINE SULFATE/0.9% NACL/PF 30 MG/30ML
PATIENT CONTROLLED ANALGESIA SYRINGE INTRAVENOUS
Status: DISPENSED
Start: 2021-07-13 | End: 2021-07-13

## 2021-07-13 RX ORDER — SODIUM CHLORIDE 9 MG/ML
INJECTION, SOLUTION INTRAVENOUS CONTINUOUS
Status: DISCONTINUED | OUTPATIENT
Start: 2021-07-13 | End: 2021-07-15 | Stop reason: HOSPADM

## 2021-07-13 RX ORDER — METOPROLOL SUCCINATE 25 MG/1
25 TABLET, EXTENDED RELEASE ORAL
Status: DISCONTINUED | OUTPATIENT
Start: 2021-07-13 | End: 2021-07-13 | Stop reason: HOSPADM

## 2021-07-13 RX ORDER — POLYETHYLENE GLYCOL 3350 17 G/17G
17 POWDER, FOR SOLUTION ORAL DAILY
Status: DISCONTINUED | OUTPATIENT
Start: 2021-07-13 | End: 2021-07-15 | Stop reason: HOSPADM

## 2021-07-13 RX ORDER — ZONISAMIDE 100 MG/1
100 CAPSULE ORAL NIGHTLY
Status: DISCONTINUED | OUTPATIENT
Start: 2021-07-13 | End: 2021-07-15 | Stop reason: HOSPADM

## 2021-07-13 RX ORDER — SODIUM CHLORIDE, SODIUM LACTATE, POTASSIUM CHLORIDE, CALCIUM CHLORIDE 600; 310; 30; 20 MG/100ML; MG/100ML; MG/100ML; MG/100ML
INJECTION, SOLUTION INTRAVENOUS CONTINUOUS
Status: DISCONTINUED | OUTPATIENT
Start: 2021-07-13 | End: 2021-07-13 | Stop reason: HOSPADM

## 2021-07-13 RX ORDER — ONDANSETRON 4 MG/1
4 TABLET, ORALLY DISINTEGRATING ORAL EVERY 12 HOURS PRN
Status: DISCONTINUED | OUTPATIENT
Start: 2021-07-13 | End: 2021-07-15 | Stop reason: HOSPADM

## 2021-07-13 RX ORDER — PROPOFOL 10 MG/ML
INJECTION, EMULSION INTRAVENOUS PRN
Status: DISCONTINUED | OUTPATIENT
Start: 2021-07-13 | End: 2021-07-13

## 2021-07-13 RX ORDER — ACETAMINOPHEN 500 MG
1000 TABLET ORAL EVERY 8 HOURS SCHEDULED
Status: DISCONTINUED | OUTPATIENT
Start: 2021-07-13 | End: 2021-07-15 | Stop reason: HOSPADM

## 2021-07-13 RX ORDER — ROCURONIUM BROMIDE 10 MG/ML
INJECTION, SOLUTION INTRAVENOUS PRN
Status: DISCONTINUED | OUTPATIENT
Start: 2021-07-13 | End: 2021-07-13

## 2021-07-13 RX ORDER — AMLODIPINE BESYLATE 5 MG/1
5 TABLET ORAL NIGHTLY
Status: DISCONTINUED | OUTPATIENT
Start: 2021-07-13 | End: 2021-07-15 | Stop reason: HOSPADM

## 2021-07-13 RX ORDER — ONDANSETRON 2 MG/ML
4 INJECTION INTRAMUSCULAR; INTRAVENOUS 2 TIMES DAILY PRN
Status: DISCONTINUED | OUTPATIENT
Start: 2021-07-13 | End: 2021-07-13 | Stop reason: HOSPADM

## 2021-07-13 RX ORDER — NEOSTIGMINE METHYLSULFATE 4 MG/4 ML
SYRINGE (ML) INTRAVENOUS PRN
Status: DISCONTINUED | OUTPATIENT
Start: 2021-07-13 | End: 2021-07-13

## 2021-07-13 RX ORDER — NALBUPHINE HYDROCHLORIDE 10 MG/ML
2.5 INJECTION, SOLUTION INTRAMUSCULAR; INTRAVENOUS; SUBCUTANEOUS EVERY 8 HOURS PRN
Status: DISCONTINUED | OUTPATIENT
Start: 2021-07-13 | End: 2021-07-15 | Stop reason: HOSPADM

## 2021-07-13 RX ORDER — HYDRALAZINE HYDROCHLORIDE 20 MG/ML
5 INJECTION INTRAMUSCULAR; INTRAVENOUS EVERY 10 MIN PRN
Status: DISCONTINUED | OUTPATIENT
Start: 2021-07-13 | End: 2021-07-13 | Stop reason: HOSPADM

## 2021-07-13 RX ORDER — GABAPENTIN 300 MG/1
300 CAPSULE ORAL EVERY 8 HOURS SCHEDULED
Status: DISCONTINUED | OUTPATIENT
Start: 2021-07-13 | End: 2021-07-15 | Stop reason: HOSPADM

## 2021-07-13 RX ORDER — DIPHENHYDRAMINE HYDROCHLORIDE 50 MG/ML
25 INJECTION INTRAMUSCULAR; INTRAVENOUS EVERY 4 HOURS PRN
Status: DISCONTINUED | OUTPATIENT
Start: 2021-07-13 | End: 2021-07-13 | Stop reason: HOSPADM

## 2021-07-13 RX ORDER — METOCLOPRAMIDE HYDROCHLORIDE 5 MG/ML
5 INJECTION INTRAMUSCULAR; INTRAVENOUS EVERY 6 HOURS PRN
Status: DISCONTINUED | OUTPATIENT
Start: 2021-07-13 | End: 2021-07-13 | Stop reason: HOSPADM

## 2021-07-13 RX ORDER — SIMETHICONE 125 MG
125 TABLET,CHEWABLE ORAL EVERY 4 HOURS PRN
Status: DISCONTINUED | OUTPATIENT
Start: 2021-07-13 | End: 2021-07-15 | Stop reason: HOSPADM

## 2021-07-13 RX ORDER — GLYCOPYRROLATE 0.2 MG/ML
INJECTION, SOLUTION INTRAMUSCULAR; INTRAVENOUS PRN
Status: DISCONTINUED | OUTPATIENT
Start: 2021-07-13 | End: 2021-07-13

## 2021-07-13 RX ORDER — FAMOTIDINE 20 MG/1
20 TABLET, FILM COATED ORAL
Status: COMPLETED | OUTPATIENT
Start: 2021-07-13 | End: 2021-07-13

## 2021-07-13 RX ORDER — BUTALBITAL, ACETAMINOPHEN AND CAFFEINE 50; 325; 40 MG/1; MG/1; MG/1
1 TABLET ORAL EVERY 6 HOURS PRN
Status: DISCONTINUED | OUTPATIENT
Start: 2021-07-13 | End: 2021-07-15 | Stop reason: HOSPADM

## 2021-07-13 RX ORDER — SCOLOPAMINE TRANSDERMAL SYSTEM 1 MG/1
1 PATCH, EXTENDED RELEASE TRANSDERMAL
Status: DISCONTINUED | OUTPATIENT
Start: 2021-07-13 | End: 2021-07-13 | Stop reason: HOSPADM

## 2021-07-13 RX ORDER — ALBUTEROL SULFATE 2.5 MG/3ML
5 SOLUTION RESPIRATORY (INHALATION) ONCE
Status: DISCONTINUED | OUTPATIENT
Start: 2021-07-13 | End: 2021-07-13 | Stop reason: HOSPADM

## 2021-07-13 RX ORDER — SODIUM CHLORIDE, SODIUM LACTATE, POTASSIUM CHLORIDE, CALCIUM CHLORIDE 600; 310; 30; 20 MG/100ML; MG/100ML; MG/100ML; MG/100ML
INJECTION, SOLUTION INTRAVENOUS CONTINUOUS
Status: DISCONTINUED | OUTPATIENT
Start: 2021-07-13 | End: 2021-07-15 | Stop reason: HOSPADM

## 2021-07-13 RX ORDER — MIDAZOLAM HYDROCHLORIDE 1 MG/ML
INJECTION, SOLUTION INTRAMUSCULAR; INTRAVENOUS PRN
Status: DISCONTINUED | OUTPATIENT
Start: 2021-07-13 | End: 2021-07-13

## 2021-07-13 RX ORDER — ONDANSETRON 2 MG/ML
INJECTION INTRAMUSCULAR; INTRAVENOUS PRN
Status: DISCONTINUED | OUTPATIENT
Start: 2021-07-13 | End: 2021-07-13

## 2021-07-13 RX ORDER — LIDOCAINE HYDROCHLORIDE 10 MG/ML
INJECTION, SOLUTION INFILTRATION; PERINEURAL PRN
Status: DISCONTINUED | OUTPATIENT
Start: 2021-07-13 | End: 2021-07-13

## 2021-07-13 RX ORDER — DEXAMETHASONE SODIUM PHOSPHATE 4 MG/ML
4 INJECTION, SOLUTION INTRA-ARTICULAR; INTRALESIONAL; INTRAMUSCULAR; INTRAVENOUS; SOFT TISSUE
Status: DISCONTINUED | OUTPATIENT
Start: 2021-07-13 | End: 2021-07-13 | Stop reason: HOSPADM

## 2021-07-13 RX ORDER — ACETAMINOPHEN 325 MG/1
650 TABLET ORAL EVERY 4 HOURS PRN
Status: DISCONTINUED | OUTPATIENT
Start: 2021-07-13 | End: 2021-07-13 | Stop reason: HOSPADM

## 2021-07-13 RX ORDER — NALOXONE HCL 0.4 MG/ML
0.1 VIAL (ML) INJECTION PRN
Status: DISCONTINUED | OUTPATIENT
Start: 2021-07-13 | End: 2021-07-15 | Stop reason: HOSPADM

## 2021-07-13 RX ORDER — SODIUM CHLORIDE 9 MG/ML
INJECTION, SOLUTION INTRAVENOUS CONTINUOUS PRN
Status: DISCONTINUED | OUTPATIENT
Start: 2021-07-13 | End: 2021-07-13

## 2021-07-13 RX ORDER — MIDAZOLAM HYDROCHLORIDE 1 MG/ML
2 INJECTION, SOLUTION INTRAMUSCULAR; INTRAVENOUS
Status: DISCONTINUED | OUTPATIENT
Start: 2021-07-13 | End: 2021-07-13 | Stop reason: HOSPADM

## 2021-07-13 RX ORDER — 0.9 % SODIUM CHLORIDE 0.9 %
2 VIAL (ML) INJECTION EVERY 12 HOURS SCHEDULED
Status: DISCONTINUED | OUTPATIENT
Start: 2021-07-13 | End: 2021-07-15 | Stop reason: HOSPADM

## 2021-07-13 RX ORDER — ONDANSETRON 2 MG/ML
4 INJECTION INTRAMUSCULAR; INTRAVENOUS EVERY 12 HOURS PRN
Status: DISCONTINUED | OUTPATIENT
Start: 2021-07-13 | End: 2021-07-15 | Stop reason: HOSPADM

## 2021-07-13 RX ORDER — CELECOXIB 100 MG/1
200 CAPSULE ORAL EVERY 12 HOURS SCHEDULED
Status: DISCONTINUED | OUTPATIENT
Start: 2021-07-14 | End: 2021-07-15 | Stop reason: HOSPADM

## 2021-07-13 RX ORDER — ATORVASTATIN CALCIUM 40 MG/1
40 TABLET, FILM COATED ORAL NIGHTLY
Status: DISCONTINUED | OUTPATIENT
Start: 2021-07-13 | End: 2021-07-15 | Stop reason: HOSPADM

## 2021-07-13 RX ADMIN — PROPOFOL 140 MG: 10 INJECTION, EMULSION INTRAVENOUS at 07:34

## 2021-07-13 RX ADMIN — ACETAMINOPHEN 1000 MG: 500 TABLET ORAL at 20:59

## 2021-07-13 RX ADMIN — ACETAMINOPHEN 1000 MG: 500 TABLET ORAL at 15:36

## 2021-07-13 RX ADMIN — FENTANYL CITRATE 25 MCG: 50 INJECTION INTRAMUSCULAR; INTRAVENOUS at 09:20

## 2021-07-13 RX ADMIN — GABAPENTIN 300 MG: 300 CAPSULE ORAL at 15:36

## 2021-07-13 RX ADMIN — FENTANYL CITRATE 25 MCG: 50 INJECTION INTRAMUSCULAR; INTRAVENOUS at 09:10

## 2021-07-13 RX ADMIN — SODIUM CHLORIDE, POTASSIUM CHLORIDE, SODIUM LACTATE AND CALCIUM CHLORIDE: 600; 310; 30; 20 INJECTION, SOLUTION INTRAVENOUS at 06:32

## 2021-07-13 RX ADMIN — SODIUM CHLORIDE, POTASSIUM CHLORIDE, SODIUM LACTATE AND CALCIUM CHLORIDE: 600; 310; 30; 20 INJECTION, SOLUTION INTRAVENOUS at 06:15

## 2021-07-13 RX ADMIN — MIDAZOLAM HYDROCHLORIDE 2 MG: 1 INJECTION, SOLUTION INTRAMUSCULAR; INTRAVENOUS at 07:28

## 2021-07-13 RX ADMIN — MORPHINE SULFATE: 1 INJECTION INTRAVENOUS at 09:35

## 2021-07-13 RX ADMIN — FENTANYL CITRATE 50 MCG: 50 INJECTION, SOLUTION INTRAMUSCULAR; INTRAVENOUS at 07:28

## 2021-07-13 RX ADMIN — ENOXAPARIN SODIUM 40 MG: 40 INJECTION SUBCUTANEOUS at 20:59

## 2021-07-13 RX ADMIN — DEXAMETHASONE SODIUM PHOSPHATE 8 MG: 4 INJECTION, SOLUTION INTRAMUSCULAR; INTRAVENOUS at 07:37

## 2021-07-13 RX ADMIN — SODIUM CHLORIDE, PRESERVATIVE FREE 2 ML: 5 INJECTION INTRAVENOUS at 21:13

## 2021-07-13 RX ADMIN — POLYETHYLENE GLYCOL 3350 17 G: 17 POWDER, FOR SOLUTION ORAL at 12:26

## 2021-07-13 RX ADMIN — METRONIDAZOLE 500 MG: 500 INJECTION, SOLUTION INTRAVENOUS at 07:46

## 2021-07-13 RX ADMIN — DESMOPRESSIN ACETATE 40 MG: 0.2 TABLET ORAL at 22:51

## 2021-07-13 RX ADMIN — ONDANSETRON 4 MG: 2 INJECTION INTRAMUSCULAR; INTRAVENOUS at 08:30

## 2021-07-13 RX ADMIN — Medication 30 MG: at 08:29

## 2021-07-13 RX ADMIN — HYDROMORPHONE HYDROCHLORIDE 1 MG: 1 INJECTION, SOLUTION INTRAMUSCULAR; INTRAVENOUS; SUBCUTANEOUS at 07:52

## 2021-07-13 RX ADMIN — GABAPENTIN 300 MG: 300 CAPSULE ORAL at 20:59

## 2021-07-13 RX ADMIN — SODIUM CHLORIDE: 9 INJECTION, SOLUTION INTRAVENOUS at 08:13

## 2021-07-13 RX ADMIN — CHLORHEXIDINE GLUCONATE 15 ML: 1.2 RINSE ORAL at 12:26

## 2021-07-13 RX ADMIN — FENTANYL CITRATE 50 MCG: 50 INJECTION INTRAMUSCULAR; INTRAVENOUS at 09:35

## 2021-07-13 RX ADMIN — AMLODIPINE BESYLATE 5 MG: 5 TABLET ORAL at 22:51

## 2021-07-13 RX ADMIN — SODIUM CHLORIDE, SODIUM LACTATE, POTASSIUM CHLORIDE, AND CALCIUM CHLORIDE: .6; .31; .03; .02 INJECTION, SOLUTION INTRAVENOUS at 12:26

## 2021-07-13 RX ADMIN — KETOROLAC TROMETHAMINE 30 MG: 30 INJECTION, SOLUTION INTRAMUSCULAR; INTRAVENOUS at 15:36

## 2021-07-13 RX ADMIN — CIPROFLOXACIN 400 MG: 2 INJECTION, SOLUTION INTRAVENOUS at 07:38

## 2021-07-13 RX ADMIN — SODIUM CHLORIDE, SODIUM LACTATE, POTASSIUM CHLORIDE, AND CALCIUM CHLORIDE: .6; .31; .03; .02 INJECTION, SOLUTION INTRAVENOUS at 20:58

## 2021-07-13 RX ADMIN — ROCURONIUM BROMIDE 40 MG: 50 INJECTION, SOLUTION INTRAVENOUS at 07:34

## 2021-07-13 RX ADMIN — Medication 3 MG: at 08:30

## 2021-07-13 RX ADMIN — FAMOTIDINE 20 MG: 20 TABLET, FILM COATED ORAL at 06:29

## 2021-07-13 RX ADMIN — CHLORHEXIDINE GLUCONATE 15 ML: 1.2 RINSE ORAL at 20:59

## 2021-07-13 RX ADMIN — ZONISAMIDE 100 MG: 100 CAPSULE ORAL at 22:51

## 2021-07-13 RX ADMIN — KETOROLAC TROMETHAMINE 30 MG: 30 INJECTION, SOLUTION INTRAMUSCULAR; INTRAVENOUS at 20:59

## 2021-07-13 RX ADMIN — FENTANYL CITRATE 50 MCG: 50 INJECTION INTRAMUSCULAR; INTRAVENOUS at 09:25

## 2021-07-13 RX ADMIN — LIDOCAINE HYDROCHLORIDE 7 ML: 10 INJECTION, SOLUTION INFILTRATION; PERINEURAL at 07:34

## 2021-07-13 RX ADMIN — GLYCOPYRROLATE 0.6 MG: 0.2 INJECTION, SOLUTION INTRAMUSCULAR; INTRAVENOUS at 08:30

## 2021-07-13 ASSESSMENT — COGNITIVE AND FUNCTIONAL STATUS - GENERAL
ARE YOU BLIND OR DO YOU HAVE SERIOUS DIFFICULTY SEEING, EVEN WHEN WEARING GLASSES: NO
DO YOU HAVE SERIOUS DIFFICULTY WALKING OR CLIMBING STAIRS: NO
ARE YOU DEAF OR DO YOU HAVE SERIOUS DIFFICULTY  HEARING: NO
BECAUSE OF A PHYSICAL, MENTAL, OR EMOTIONAL CONDITION, DO YOU HAVE DIFFICULTY DOING ERRANDS ALONE: NO
BECAUSE OF A PHYSICAL, MENTAL, OR EMOTIONAL CONDITION, DO YOU HAVE SERIOUS DIFFICULTY CONCENTRATING, REMEMBERING OR MAKING DECISIONS: NO
DO YOU HAVE DIFFICULTY DRESSING OR BATHING: NO

## 2021-07-13 ASSESSMENT — LIFESTYLE VARIABLES
HOW OFTEN DO YOU HAVE A DRINK CONTAINING ALCOHOL: 2 TO 4 TIMES PER MONTH
ALCOHOL_USE_STATUS: NO OR LOW RISK WITH VALIDATED TOOL
HOW OFTEN DO YOU HAVE 6 OR MORE DRINKS ON ONE OCCASION: NEVER
AUDIT-C TOTAL SCORE: 2
HOW MANY STANDARD DRINKS CONTAINING ALCOHOL DO YOU HAVE ON A TYPICAL DAY: 0,1 OR 2

## 2021-07-13 ASSESSMENT — PAIN DESCRIPTION - PAIN TYPE
TYPE: ACUTE PAIN

## 2021-07-13 ASSESSMENT — ACTIVITIES OF DAILY LIVING (ADL)
RECENT_DECLINE_ADL: NO
ADL_SHORT_OF_BREATH: NO
CHRONIC_PAIN_PRESENT: NO
ADL_SCORE: 12
ADL_BEFORE_ADMISSION: INDEPENDENT

## 2021-07-13 ASSESSMENT — PAIN SCALES - GENERAL
PAINLEVEL_OUTOF10: 2
PAINLEVEL_OUTOF10: 5
PAINLEVEL_OUTOF10: 6
PAINLEVEL_OUTOF10: 3
PAINLEVEL_OUTOF10: 5
PAINLEVEL_OUTOF10: 7
PAINLEVEL_OUTOF10: 6
PAINLEVEL_OUTOF10: 8
PAINLEVEL_OUTOF10: 8

## 2021-07-13 ASSESSMENT — PAIN SCALES - WONG BAKER
WONGBAKER_NUMERICALRESPONSE: 0
WONGBAKER_NUMERICALRESPONSE: 0

## 2021-07-13 ASSESSMENT — COLUMBIA-SUICIDE SEVERITY RATING SCALE - C-SSRS: IS THE PATIENT ABLE TO COMPLETE C-SSRS: NO, DEFER TO LATER TIME

## 2021-07-13 ASSESSMENT — PATIENT HEALTH QUESTIONNAIRE - PHQ9: IS PATIENT ABLE TO COMPLETE PHQ2 OR PHQ9: NO, DEFER TO LATER TIME

## 2021-07-13 ASSESSMENT — ENCOUNTER SYMPTOMS: HEADACHES: 1

## 2021-07-14 LAB
BASOPHILS # BLD: 0 K/MCL (ref 0–0.3)
BASOPHILS NFR BLD: 0 %
DEPRECATED RDW RBC: 45.4 FL (ref 39–50)
EOSINOPHIL # BLD: 0 K/MCL (ref 0–0.5)
EOSINOPHIL NFR BLD: 0 %
ERYTHROCYTE [DISTWIDTH] IN BLOOD: 14 % (ref 11–15)
HCT VFR BLD CALC: 35 % (ref 36–46.5)
HGB BLD-MCNC: 11.2 G/DL (ref 12–15.5)
IMM GRANULOCYTES # BLD AUTO: 0 K/MCL (ref 0–0.2)
IMM GRANULOCYTES # BLD: 1 %
LYMPHOCYTES # BLD: 1.1 K/MCL (ref 1–4)
LYMPHOCYTES NFR BLD: 15 %
MCH RBC QN AUTO: 28.6 PG (ref 26–34)
MCHC RBC AUTO-ENTMCNC: 32 G/DL (ref 32–36.5)
MCV RBC AUTO: 89.3 FL (ref 78–100)
MONOCYTES # BLD: 0.8 K/MCL (ref 0.3–0.9)
MONOCYTES NFR BLD: 11 %
NEUTROPHILS # BLD: 5.8 K/MCL (ref 1.8–7.7)
NEUTROPHILS NFR BLD: 73 %
NRBC BLD MANUAL-RTO: 0 /100 WBC
PLATELET # BLD AUTO: 185 K/MCL (ref 140–450)
RAINBOW EXTRA TUBES HOLD SPECIMEN: NORMAL
RBC # BLD: 3.92 MIL/MCL (ref 4–5.2)
WBC # BLD: 7.8 K/MCL (ref 4.2–11)

## 2021-07-14 PROCEDURE — 85025 COMPLETE CBC W/AUTO DIFF WBC: CPT | Performed by: OBSTETRICS & GYNECOLOGY

## 2021-07-14 PROCEDURE — 10002803 HB RX 637: Performed by: OBSTETRICS & GYNECOLOGY

## 2021-07-14 PROCEDURE — 10002800 HB RX 250 W HCPCS: Performed by: OBSTETRICS & GYNECOLOGY

## 2021-07-14 PROCEDURE — 10000002 HB ROOM CHARGE MED SURG

## 2021-07-14 PROCEDURE — 36415 COLL VENOUS BLD VENIPUNCTURE: CPT | Performed by: OBSTETRICS & GYNECOLOGY

## 2021-07-14 PROCEDURE — 10004651 HB RX, NO CHARGE ITEM: Performed by: OBSTETRICS & GYNECOLOGY

## 2021-07-14 PROCEDURE — 10002807 HB RX 258: Performed by: OBSTETRICS & GYNECOLOGY

## 2021-07-14 RX ORDER — OXYCODONE HYDROCHLORIDE 5 MG/1
5 TABLET ORAL EVERY 4 HOURS PRN
Status: DISCONTINUED | OUTPATIENT
Start: 2021-07-14 | End: 2021-07-15 | Stop reason: HOSPADM

## 2021-07-14 RX ORDER — OXYCODONE HYDROCHLORIDE 5 MG/1
10 TABLET ORAL EVERY 6 HOURS PRN
Status: DISCONTINUED | OUTPATIENT
Start: 2021-07-14 | End: 2021-07-15 | Stop reason: HOSPADM

## 2021-07-14 RX ADMIN — ACETAMINOPHEN 1000 MG: 500 TABLET ORAL at 06:01

## 2021-07-14 RX ADMIN — POLYETHYLENE GLYCOL 3350 17 G: 17 POWDER, FOR SOLUTION ORAL at 08:06

## 2021-07-14 RX ADMIN — SODIUM CHLORIDE, SODIUM LACTATE, POTASSIUM CHLORIDE, AND CALCIUM CHLORIDE: .6; .31; .03; .02 INJECTION, SOLUTION INTRAVENOUS at 05:10

## 2021-07-14 RX ADMIN — CHLORHEXIDINE GLUCONATE 15 ML: 1.2 RINSE ORAL at 21:10

## 2021-07-14 RX ADMIN — AMLODIPINE BESYLATE 5 MG: 5 TABLET ORAL at 21:09

## 2021-07-14 RX ADMIN — OXYCODONE HYDROCHLORIDE 5 MG: 5 TABLET ORAL at 11:22

## 2021-07-14 RX ADMIN — ACETAMINOPHEN 1000 MG: 500 TABLET ORAL at 21:09

## 2021-07-14 RX ADMIN — OXYCODONE HYDROCHLORIDE 10 MG: 5 TABLET ORAL at 21:48

## 2021-07-14 RX ADMIN — SIMETHICONE 125 MG: 125 TABLET, CHEWABLE ORAL at 13:10

## 2021-07-14 RX ADMIN — CHLORHEXIDINE GLUCONATE 15 ML: 1.2 RINSE ORAL at 08:06

## 2021-07-14 RX ADMIN — SODIUM CHLORIDE, PRESERVATIVE FREE 2 ML: 5 INJECTION INTRAVENOUS at 21:12

## 2021-07-14 RX ADMIN — ZONISAMIDE 100 MG: 100 CAPSULE ORAL at 21:09

## 2021-07-14 RX ADMIN — KETOROLAC TROMETHAMINE 30 MG: 30 INJECTION, SOLUTION INTRAMUSCULAR; INTRAVENOUS at 08:06

## 2021-07-14 RX ADMIN — ENOXAPARIN SODIUM 40 MG: 40 INJECTION SUBCUTANEOUS at 21:10

## 2021-07-14 RX ADMIN — OXYCODONE HYDROCHLORIDE 10 MG: 5 TABLET ORAL at 16:02

## 2021-07-14 RX ADMIN — CELECOXIB 200 MG: 100 CAPSULE ORAL at 21:09

## 2021-07-14 RX ADMIN — GABAPENTIN 300 MG: 300 CAPSULE ORAL at 21:09

## 2021-07-14 RX ADMIN — KETOROLAC TROMETHAMINE 30 MG: 30 INJECTION, SOLUTION INTRAMUSCULAR; INTRAVENOUS at 03:13

## 2021-07-14 RX ADMIN — GABAPENTIN 300 MG: 300 CAPSULE ORAL at 13:10

## 2021-07-14 RX ADMIN — SIMETHICONE 125 MG: 125 TABLET, CHEWABLE ORAL at 21:17

## 2021-07-14 RX ADMIN — ACETAMINOPHEN 1000 MG: 500 TABLET ORAL at 13:09

## 2021-07-14 RX ADMIN — DESMOPRESSIN ACETATE 40 MG: 0.2 TABLET ORAL at 21:09

## 2021-07-14 RX ADMIN — GABAPENTIN 300 MG: 300 CAPSULE ORAL at 06:01

## 2021-07-14 ASSESSMENT — PAIN SCALES - GENERAL
PAINLEVEL_OUTOF10: 8
PAINLEVEL_OUTOF10: 4
PAINLEVEL_OUTOF10: 3
PAINLEVEL_OUTOF10: 3
PAINLEVEL_OUTOF10: 8
PAINLEVEL_OUTOF10: 4
PAINLEVEL_OUTOF10: 3
PAINLEVEL_OUTOF10: 7

## 2021-07-14 ASSESSMENT — PATIENT HEALTH QUESTIONNAIRE - PHQ9
CLINICAL INTERPRETATION OF PHQ2 SCORE: NO FURTHER SCREENING NEEDED
SUM OF ALL RESPONSES TO PHQ9 QUESTIONS 1 AND 2: 0
IS PATIENT ABLE TO COMPLETE PHQ2 OR PHQ9: YES
CLINICAL INTERPRETATION OF PHQ9 SCORE: NO FURTHER SCREENING NEEDED

## 2021-07-15 VITALS
SYSTOLIC BLOOD PRESSURE: 117 MMHG | HEART RATE: 59 BPM | TEMPERATURE: 97.6 F | OXYGEN SATURATION: 100 % | RESPIRATION RATE: 16 BRPM | DIASTOLIC BLOOD PRESSURE: 74 MMHG | HEIGHT: 66 IN | WEIGHT: 159.83 LBS | BODY MASS INDEX: 25.69 KG/M2

## 2021-07-15 LAB
ASR DISCLAIMER: NORMAL
BASOPHILS # BLD: 0 K/MCL (ref 0–0.3)
BASOPHILS NFR BLD: 1 %
CASE RPRT: NORMAL
CLINICAL INFO: NORMAL
DEPRECATED RDW RBC: 46.4 FL (ref 39–50)
EOSINOPHIL # BLD: 0.1 K/MCL (ref 0–0.5)
EOSINOPHIL NFR BLD: 2 %
ERYTHROCYTE [DISTWIDTH] IN BLOOD: 14.2 % (ref 11–15)
HCT VFR BLD CALC: 34.8 % (ref 36–46.5)
HGB BLD-MCNC: 11.1 G/DL (ref 12–15.5)
IMM GRANULOCYTES # BLD AUTO: 0 K/MCL (ref 0–0.2)
IMM GRANULOCYTES # BLD: 0 %
LYMPHOCYTES # BLD: 1.2 K/MCL (ref 1–4)
LYMPHOCYTES NFR BLD: 23 %
MCH RBC QN AUTO: 28.8 PG (ref 26–34)
MCHC RBC AUTO-ENTMCNC: 31.9 G/DL (ref 32–36.5)
MCV RBC AUTO: 90.2 FL (ref 78–100)
MONOCYTES # BLD: 0.5 K/MCL (ref 0.3–0.9)
MONOCYTES NFR BLD: 10 %
NEUTROPHILS # BLD: 3.3 K/MCL (ref 1.8–7.7)
NEUTROPHILS NFR BLD: 64 %
NRBC BLD MANUAL-RTO: 0 /100 WBC
PATH REPORT.FINAL DX SPEC: NORMAL
PATH REPORT.GROSS SPEC: NORMAL
PLATELET # BLD AUTO: 160 K/MCL (ref 140–450)
RAINBOW EXTRA TUBES HOLD SPECIMEN: NORMAL
RBC # BLD: 3.86 MIL/MCL (ref 4–5.2)
WBC # BLD: 5.2 K/MCL (ref 4.2–11)

## 2021-07-15 PROCEDURE — 10002803 HB RX 637: Performed by: OBSTETRICS & GYNECOLOGY

## 2021-07-15 PROCEDURE — 10004651 HB RX, NO CHARGE ITEM: Performed by: OBSTETRICS & GYNECOLOGY

## 2021-07-15 PROCEDURE — 85025 COMPLETE CBC W/AUTO DIFF WBC: CPT | Performed by: OBSTETRICS & GYNECOLOGY

## 2021-07-15 PROCEDURE — 36415 COLL VENOUS BLD VENIPUNCTURE: CPT | Performed by: OBSTETRICS & GYNECOLOGY

## 2021-07-15 RX ORDER — IBUPROFEN 600 MG/1
600 TABLET ORAL EVERY 6 HOURS PRN
Qty: 60 TABLET | Refills: 1 | Status: SHIPPED | OUTPATIENT
Start: 2021-07-15

## 2021-07-15 RX ORDER — ACETAMINOPHEN 500 MG
500 TABLET ORAL EVERY 4 HOURS PRN
Qty: 60 TABLET | Refills: 1 | Status: SHIPPED | OUTPATIENT
Start: 2021-07-15

## 2021-07-15 RX ORDER — OXYCODONE HYDROCHLORIDE 5 MG/1
5 TABLET ORAL EVERY 4 HOURS PRN
Qty: 40 TABLET | Refills: 0 | Status: SHIPPED | OUTPATIENT
Start: 2021-07-15

## 2021-07-15 RX ORDER — POLYETHYLENE GLYCOL 3350 17 G/17G
17 POWDER, FOR SOLUTION ORAL DAILY
Qty: 30 PACKET | Refills: 0 | Status: SHIPPED | OUTPATIENT
Start: 2021-07-16

## 2021-07-15 RX ADMIN — CELECOXIB 200 MG: 100 CAPSULE ORAL at 09:01

## 2021-07-15 RX ADMIN — CHLORHEXIDINE GLUCONATE 15 ML: 1.2 RINSE ORAL at 09:03

## 2021-07-15 RX ADMIN — ACETAMINOPHEN 1000 MG: 500 TABLET ORAL at 05:54

## 2021-07-15 RX ADMIN — GABAPENTIN 300 MG: 300 CAPSULE ORAL at 13:38

## 2021-07-15 RX ADMIN — OXYCODONE HYDROCHLORIDE 5 MG: 5 TABLET ORAL at 15:26

## 2021-07-15 RX ADMIN — GABAPENTIN 300 MG: 300 CAPSULE ORAL at 05:54

## 2021-07-15 RX ADMIN — SODIUM CHLORIDE, PRESERVATIVE FREE 2 ML: 5 INJECTION INTRAVENOUS at 09:03

## 2021-07-15 RX ADMIN — POLYETHYLENE GLYCOL 3350 17 G: 17 POWDER, FOR SOLUTION ORAL at 09:02

## 2021-07-15 RX ADMIN — ACETAMINOPHEN 1000 MG: 500 TABLET ORAL at 13:37

## 2021-07-15 RX ADMIN — OXYCODONE HYDROCHLORIDE 10 MG: 5 TABLET ORAL at 05:54

## 2021-07-15 ASSESSMENT — PAIN SCALES - GENERAL
PAINLEVEL_OUTOF10: 6
PAINLEVEL_OUTOF10: 2
PAINLEVEL_OUTOF10: 3
PAINLEVEL_OUTOF10: 2

## 2021-07-29 ENCOUNTER — TELEPHONE (OUTPATIENT)
Dept: NEUROLOGY | Facility: CLINIC | Age: 51
End: 2021-07-29

## 2021-07-29 ENCOUNTER — OFFICE VISIT (OUTPATIENT)
Dept: NEUROLOGY | Facility: CLINIC | Age: 51
End: 2021-07-29
Payer: COMMERCIAL

## 2021-07-29 VITALS
RESPIRATION RATE: 16 BRPM | BODY MASS INDEX: 24.27 KG/M2 | DIASTOLIC BLOOD PRESSURE: 78 MMHG | HEART RATE: 82 BPM | HEIGHT: 66 IN | WEIGHT: 151 LBS | SYSTOLIC BLOOD PRESSURE: 126 MMHG

## 2021-07-29 DIAGNOSIS — G43.011 INTRACTABLE MIGRAINE WITHOUT AURA AND WITH STATUS MIGRAINOSUS: ICD-10-CM

## 2021-07-29 DIAGNOSIS — R51.9 CHRONIC INTRACTABLE HEADACHE, UNSPECIFIED HEADACHE TYPE: Primary | ICD-10-CM

## 2021-07-29 DIAGNOSIS — IMO0002 CHRONIC MIGRAINE: ICD-10-CM

## 2021-07-29 DIAGNOSIS — G89.29 CHRONIC INTRACTABLE HEADACHE, UNSPECIFIED HEADACHE TYPE: Primary | ICD-10-CM

## 2021-07-29 PROCEDURE — 64615 CHEMODENERV MUSC MIGRAINE: CPT | Performed by: OTHER

## 2021-07-29 PROCEDURE — 99212 OFFICE O/P EST SF 10 MIN: CPT | Performed by: OTHER

## 2021-07-29 PROCEDURE — 3074F SYST BP LT 130 MM HG: CPT | Performed by: OTHER

## 2021-07-29 PROCEDURE — 3008F BODY MASS INDEX DOCD: CPT | Performed by: OTHER

## 2021-07-29 PROCEDURE — 3078F DIAST BP <80 MM HG: CPT | Performed by: OTHER

## 2021-07-29 RX ORDER — RIMEGEPANT SULFATE 75 MG/75MG
75 TABLET, ORALLY DISINTEGRATING ORAL EVERY OTHER DAY
Qty: 15 TABLET | Refills: 3 | Status: SHIPPED | OUTPATIENT
Start: 2021-07-29 | End: 2021-08-06

## 2021-07-29 RX ORDER — NARATRIPTAN 1 MG/1
TABLET ORAL
Qty: 12 TABLET | Refills: 5 | Status: SHIPPED | OUTPATIENT
Start: 2021-07-29

## 2021-07-29 RX ORDER — BUTALBITAL, ACETAMINOPHEN AND CAFFEINE 50; 325; 40 MG/1; MG/1; MG/1
TABLET ORAL
Qty: 60 TABLET | Refills: 5 | Status: SHIPPED | OUTPATIENT
Start: 2021-07-29 | End: 2021-10-04

## 2021-07-29 RX ORDER — ZONISAMIDE 100 MG/1
100 CAPSULE ORAL DAILY
Qty: 90 CAPSULE | Refills: 3 | Status: SHIPPED | OUTPATIENT
Start: 2021-07-29

## 2021-07-29 NOTE — PROGRESS NOTES
[facility] with [address] [facility]  2/17/1970  Primary Care Provider:  Cyndi Salinas DO    7/29/2021  Accompanied visit:     (x) No.      45year old yo patient being seen for:  HA Allergies:    Augmentin [Amoxicil*    HIVES  Keflex [Cephalexin *    HIVES         EXAM:  /78 (BP Location: Left arm, Patient Position: Sitting, Cuff Size: adult)   Pulse 82   Resp 16   Ht 66\"   Wt 151 lb (68.5 kg)   BMI 24.37 kg/m²   Looks stat were raised to staff at this time interval.        This document is to be interpreted as my current opinion regarding the case as of the stated date of service based on the information available to me at this time and may supersedes any prior opinion expre

## 2021-07-29 NOTE — PROGRESS NOTES
[facility] with [facility]  7/29/2021  CHRONIC MIGRAINE - BOTOX Injection  CPT: 34154    Dino Coto is a(n) 45year old female with chronic migraine. Patient is here for Botox injection. conjunctiva, anicteric sclerae, moist mucosa  Neck: No adenopathy or thyromegaly  Heart S1S2, no murmur  Lungs are clear, no wheezing  Extremities: no cyanosis or edema      PROCEDURE:  Assisted by: Smith Garcia  BOTOX injection for chronic migraine:  Using alco

## 2021-08-03 ENCOUNTER — TELEPHONE (OUTPATIENT)
Dept: NEUROLOGY | Facility: CLINIC | Age: 51
End: 2021-08-03

## 2021-08-03 DIAGNOSIS — R51.9 CHRONIC INTRACTABLE HEADACHE, UNSPECIFIED HEADACHE TYPE: Primary | ICD-10-CM

## 2021-08-03 DIAGNOSIS — G89.29 CHRONIC INTRACTABLE HEADACHE, UNSPECIFIED HEADACHE TYPE: Primary | ICD-10-CM

## 2021-08-03 DIAGNOSIS — G43.701 CHRONIC MIGRAINE WITHOUT AURA WITH STATUS MIGRAINOSUS, NOT INTRACTABLE: ICD-10-CM

## 2021-08-03 NOTE — TELEPHONE ENCOUNTER
Prior authorization requested for patient use of Nurtec 75 mg tablets. Case Key#: Albina Rivas (Gomez: [id number]) pending insurance review.

## 2021-08-05 NOTE — TELEPHONE ENCOUNTER
Prior authorization received for patient use of Nurtec 75 mg tablets. Approval granted 8/3/2021 - 8/3/2022. Pharmacy notified.

## 2021-08-06 ENCOUNTER — TELEPHONE (OUTPATIENT)
Dept: NEUROLOGY | Facility: CLINIC | Age: 51
End: 2021-08-06

## 2021-08-06 DIAGNOSIS — G89.29 CHRONIC INTRACTABLE HEADACHE, UNSPECIFIED HEADACHE TYPE: Primary | ICD-10-CM

## 2021-08-06 DIAGNOSIS — R51.9 CHRONIC INTRACTABLE HEADACHE, UNSPECIFIED HEADACHE TYPE: Primary | ICD-10-CM

## 2021-08-06 RX ORDER — RIMEGEPANT SULFATE 75 MG/75MG
75 TABLET, ORALLY DISINTEGRATING ORAL EVERY OTHER DAY
Qty: 16 TABLET | Refills: 3 | Status: SHIPPED | OUTPATIENT
Start: 2021-08-06 | End: 2021-09-05

## 2021-08-06 NOTE — TELEPHONE ENCOUNTER
Nurtec comes in packs of 8 that cannot be split. Provider ordered #15, asking if #16 is ok. Spiritual Plan of Care    Pt Name: Michelle Lopez  Pt : 1924  Date: 2018     Trigger received.  met with pt. Pt expressed grief re: , father, \"They aren't doing what they should with their lives. Please pray for them.\" Pt expressed importance of cy in her life, \"God is good. God makes all things right.\"  provided empathic listening, emotional support and prayer.  Prayer calmed patient's anxiety. Additional  support available prn.    Referral source: Trigger    Reason for visit: Routine Visit    Visited with: Patient    Patient Assessment: Relies on cy, Grief    Patient  Intervention: Emotional Support, Empathic Listening, Prayer    Spiritual Plan of Care: Follow up if requested    Patient Reported Outcome:  Reduced anxiety

## 2021-08-06 NOTE — TELEPHONE ENCOUNTER
Gave verbal order for two packs of 8 tablets (16 tablets), as 15 tablets were ordered and unable to fill. Order changed. Provider informed of quantity increments and change of order.

## 2021-09-09 ENCOUNTER — OFFICE VISIT (OUTPATIENT)
Dept: NEUROLOGY | Facility: CLINIC | Age: 51
End: 2021-09-09
Payer: COMMERCIAL

## 2021-09-09 VITALS
BODY MASS INDEX: 24.27 KG/M2 | HEART RATE: 74 BPM | HEIGHT: 66 IN | SYSTOLIC BLOOD PRESSURE: 123 MMHG | RESPIRATION RATE: 16 BRPM | WEIGHT: 151 LBS | DIASTOLIC BLOOD PRESSURE: 58 MMHG

## 2021-09-09 DIAGNOSIS — M54.81 OCCIPITAL NEURALGIA OF RIGHT SIDE: ICD-10-CM

## 2021-09-09 DIAGNOSIS — G43.701 CHRONIC MIGRAINE WITHOUT AURA WITH STATUS MIGRAINOSUS, NOT INTRACTABLE: Primary | ICD-10-CM

## 2021-09-09 PROCEDURE — 3074F SYST BP LT 130 MM HG: CPT | Performed by: OTHER

## 2021-09-09 PROCEDURE — 3008F BODY MASS INDEX DOCD: CPT | Performed by: OTHER

## 2021-09-09 PROCEDURE — 99214 OFFICE O/P EST MOD 30 MIN: CPT | Performed by: OTHER

## 2021-09-09 PROCEDURE — 3078F DIAST BP <80 MM HG: CPT | Performed by: OTHER

## 2021-09-09 RX ORDER — TIZANIDINE 2 MG/1
2 TABLET ORAL NIGHTLY
Qty: 30 TABLET | Refills: 3 | Status: SHIPPED | OUTPATIENT
Start: 2021-09-09 | End: 2021-11-01

## 2021-09-09 RX ORDER — RIMEGEPANT SULFATE 75 MG/75MG
1 TABLET, ORALLY DISINTEGRATING ORAL EVERY OTHER DAY
COMMUNITY
Start: 2021-09-07 | End: 2022-01-03

## 2021-09-09 NOTE — PROGRESS NOTES
[facility] with [address] [facility]  2/17/1970  Primary Care Provider:  Rosa Garay DO    9/9/2021  Accompanied visit:     (x) No.      45year old yo patient being seen for:  alejo Sitting, Cuff Size: adult)   Pulse 74   Resp 16   Ht 66\"   Wt 151 lb (68.5 kg)   BMI 24.37 kg/m²   Looks stated age  General Exam:  HENT:  pink conjunctiva anicteric sclerae  Neck no adenopathy, thyroid normal  Heart and Lungs:  normal  Extremities: no cy escorted out and handed-off discharge process and instructions to the check out desk.   No additional issues relevant to visit were raised to staff at this time interval.        This document is to be interpreted as my current opinion regarding the case as

## 2021-09-13 ENCOUNTER — TELEPHONE (OUTPATIENT)
Dept: NEUROLOGY | Facility: CLINIC | Age: 51
End: 2021-09-13

## 2021-09-13 NOTE — TELEPHONE ENCOUNTER
RN spoke to the patient and informed her to discontinue the tizanidine and Dr. Nayan Montoya would phone her later. Dr. Nayan Montoya would discuss an alternative medication. Pt verbalized understanding and did not have any further questions.

## 2021-09-13 NOTE — TELEPHONE ENCOUNTER
S: GI side effects from tizanidine    B: Rxed tizanidine on Friday    A: Saturday started experiencing stomach pains, yesterday she started having diarrhea. She tried taking medication with food but no improvement.     No change in headache thus far, but sh

## 2021-09-15 NOTE — TELEPHONE ENCOUNTER
GI better  HA back on neck eased but got worse again especially today  Botox might kick in 1-2 weeks  Advised applying heat  Declines MDP at this time  Will call back it things worsens  Toradol and 1 time Dexamethasone can be given then     [facility]

## 2021-10-04 DIAGNOSIS — G43.011 INTRACTABLE MIGRAINE WITHOUT AURA AND WITH STATUS MIGRAINOSUS: ICD-10-CM

## 2021-10-04 DIAGNOSIS — G89.29 CHRONIC INTRACTABLE HEADACHE, UNSPECIFIED HEADACHE TYPE: ICD-10-CM

## 2021-10-04 DIAGNOSIS — R51.9 CHRONIC INTRACTABLE HEADACHE, UNSPECIFIED HEADACHE TYPE: ICD-10-CM

## 2021-10-04 RX ORDER — BUTALBITAL, ACETAMINOPHEN AND CAFFEINE 50; 325; 40 MG/1; MG/1; MG/1
TABLET ORAL
Qty: 60 TABLET | Refills: 5 | Status: SHIPPED | OUTPATIENT
Start: 2021-10-04

## 2021-10-04 NOTE — TELEPHONE ENCOUNTER
Medication: Butalbital 50/325/40 mg    Date of last refill:07/29/21 with 5 addt refills  [place]     Last office visit: 09/09/21  Due back to clinic per last office note:  RTN in 2 months  Date next office visit schedule

## 2021-11-01 ENCOUNTER — OFFICE VISIT (OUTPATIENT)
Dept: NEUROLOGY | Facility: CLINIC | Age: 51
End: 2021-11-01
Payer: COMMERCIAL

## 2021-11-01 ENCOUNTER — TELEPHONE (OUTPATIENT)
Dept: NEUROLOGY | Facility: CLINIC | Age: 51
End: 2021-11-01

## 2021-11-01 VITALS
BODY MASS INDEX: 24.27 KG/M2 | HEIGHT: 66 IN | WEIGHT: 151 LBS | SYSTOLIC BLOOD PRESSURE: 110 MMHG | DIASTOLIC BLOOD PRESSURE: 68 MMHG | RESPIRATION RATE: 16 BRPM | HEART RATE: 77 BPM

## 2021-11-01 DIAGNOSIS — G89.29 CHRONIC INTRACTABLE HEADACHE, UNSPECIFIED HEADACHE TYPE: Primary | ICD-10-CM

## 2021-11-01 DIAGNOSIS — R51.9 CHRONIC INTRACTABLE HEADACHE, UNSPECIFIED HEADACHE TYPE: Primary | ICD-10-CM

## 2021-11-01 PROCEDURE — 3008F BODY MASS INDEX DOCD: CPT | Performed by: OTHER

## 2021-11-01 PROCEDURE — 3074F SYST BP LT 130 MM HG: CPT | Performed by: OTHER

## 2021-11-01 PROCEDURE — 64615 CHEMODENERV MUSC MIGRAINE: CPT | Performed by: OTHER

## 2021-11-01 PROCEDURE — 3078F DIAST BP <80 MM HG: CPT | Performed by: OTHER

## 2021-11-02 NOTE — PROGRESS NOTES
[facility] with [facility]  11/1/2021  CHRONIC MIGRAINE - BOTOX Injection  CPT: 30255    Jayda Cates is a(n) 45year old female with chronic migraine. Patient is here for Botox injection. thyromegaly  Heart S1S2, no murmur  Lungs are clear, no wheezing  Extremities: no cyanosis or edema      PROCEDURE:  Assisted by: CMA or RN in room  BOTOX injection for chronic migraine:  Using alcohol prep, fixed site protocol performed.   Botox was recons

## 2022-01-03 RX ORDER — RIMEGEPANT SULFATE 75 MG/75MG
TABLET, ORALLY DISINTEGRATING ORAL
Qty: 16 TABLET | Refills: 0 | Status: SHIPPED | OUTPATIENT
Start: 2022-01-03

## 2022-01-03 NOTE — TELEPHONE ENCOUNTER
Medication: NURTEC 75 MG     Date of last refill: 9/5/21 (#16/3R)  [place]     Last office visit: 11/1/21  Due back to clinic per last office note:  No mention  Date next office visit scheduled:    Future Appointment

## 2022-02-09 ENCOUNTER — OFFICE VISIT (OUTPATIENT)
Dept: NEUROLOGY | Facility: CLINIC | Age: 52
End: 2022-02-09
Payer: COMMERCIAL

## 2022-02-09 ENCOUNTER — TELEPHONE (OUTPATIENT)
Dept: NEUROLOGY | Facility: CLINIC | Age: 52
End: 2022-02-09

## 2022-02-09 VITALS
BODY MASS INDEX: 24.27 KG/M2 | HEIGHT: 66 IN | DIASTOLIC BLOOD PRESSURE: 71 MMHG | SYSTOLIC BLOOD PRESSURE: 118 MMHG | WEIGHT: 151 LBS | RESPIRATION RATE: 16 BRPM | HEART RATE: 82 BPM

## 2022-02-09 DIAGNOSIS — G43.701 CHRONIC MIGRAINE WITHOUT AURA WITH STATUS MIGRAINOSUS, NOT INTRACTABLE: Primary | ICD-10-CM

## 2022-02-09 PROCEDURE — 3078F DIAST BP <80 MM HG: CPT | Performed by: OTHER

## 2022-02-09 PROCEDURE — 3008F BODY MASS INDEX DOCD: CPT | Performed by: OTHER

## 2022-02-09 PROCEDURE — 64615 CHEMODENERV MUSC MIGRAINE: CPT | Performed by: OTHER

## 2022-02-09 PROCEDURE — 3074F SYST BP LT 130 MM HG: CPT | Performed by: OTHER

## 2022-02-09 NOTE — TELEPHONE ENCOUNTER
Botox Reauthorization Questions:  1. Has the patient experienced a reduction in frequency of migraines since starting Botox? yes  a. If yes, by what percentage? 50%  2. Has the intensity of migraines decreased since starting Botox? yes  a. If yes, by what percentage?  50%

## 2022-02-09 NOTE — PROGRESS NOTES
Paperwork noting that patient may bear financial responsibility for procedure(s) performed in clinic today signed prior to proceeding with procedure(s). Furthermore, patient notified that they should contact their insurer to verify that your procedure/test has been approved and is a COVERED benefit. Although the [facility] staff does its due diligence, the insurance carrier gives the disclaimer that \"Although the procedure is authorized, this does not guarantee payment. \"    Ultimately the patient is responsible for payment.     Botox is:  [x] Buy and Bill  [] Patient Supplied

## 2022-02-22 RX ORDER — RIMEGEPANT SULFATE 75 MG/75MG
TABLET, ORALLY DISINTEGRATING ORAL
Qty: 16 TABLET | Refills: 1 | Status: SHIPPED | OUTPATIENT
Start: 2022-02-22

## 2022-02-22 NOTE — TELEPHONE ENCOUNTER
Medication: Nurtec 75 mg     Date of last refill:01/03/22  [place]      Last office visit: 02/09/22  Due back to clinic per last office note: RTN in 3 months  Date next office visit scheduled:    Future Appointments   Date Time Provider Mark See   5/13/2022  9:40 AM MD CHAVO Naidu [facility]           Last OV note recommendation:    Botox

## 2022-02-23 ENCOUNTER — APPOINTMENT (OUTPATIENT)
Dept: CARDIOLOGY | Age: 52
End: 2022-02-23
Attending: INTERNAL MEDICINE

## 2022-04-18 RX ORDER — BUTALBITAL, ACETAMINOPHEN AND CAFFEINE 50; 325; 40 MG/1; MG/1; MG/1
TABLET ORAL
Qty: 60 TABLET | Refills: 5 | Status: SHIPPED | OUTPATIENT
Start: 2022-04-18

## 2022-04-18 NOTE — TELEPHONE ENCOUNTER
Medication: Butalbital 50/325/40 mg     Date of last refill:10/14/21 with 5 addt refills  [place]      Last office visit: 02/09/22  Due back to clinic per last office note:    Date next office visit scheduled:    Future Appointments   Date Time Provider Mark See   5/13/2022  9:40 AM Rapheal Councilman, MD [facility]           Last OV note recommendation:    Botox

## 2022-05-13 ENCOUNTER — OFFICE VISIT (OUTPATIENT)
Dept: NEUROLOGY | Facility: CLINIC | Age: 52
End: 2022-05-13
Payer: COMMERCIAL

## 2022-05-13 VITALS
SYSTOLIC BLOOD PRESSURE: 126 MMHG | RESPIRATION RATE: 16 BRPM | HEART RATE: 82 BPM | HEIGHT: 66 IN | BODY MASS INDEX: 24.27 KG/M2 | WEIGHT: 151 LBS | DIASTOLIC BLOOD PRESSURE: 78 MMHG

## 2022-05-13 DIAGNOSIS — G43.701 CHRONIC MIGRAINE WITHOUT AURA WITH STATUS MIGRAINOSUS, NOT INTRACTABLE: Primary | ICD-10-CM

## 2022-05-13 PROCEDURE — 64615 CHEMODENERV MUSC MIGRAINE: CPT | Performed by: OTHER

## 2022-05-13 PROCEDURE — 3078F DIAST BP <80 MM HG: CPT | Performed by: OTHER

## 2022-05-13 PROCEDURE — 3008F BODY MASS INDEX DOCD: CPT | Performed by: OTHER

## 2022-05-13 PROCEDURE — 3074F SYST BP LT 130 MM HG: CPT | Performed by: OTHER

## 2022-05-13 RX ORDER — RIMEGEPANT SULFATE 75 MG/75MG
1 TABLET, ORALLY DISINTEGRATING ORAL DAILY
Qty: 16 TABLET | Refills: 5 | Status: CANCELLED | OUTPATIENT
Start: 2022-05-13

## 2022-05-13 RX ORDER — RIMEGEPANT SULFATE 75 MG/75MG
1 TABLET, ORALLY DISINTEGRATING ORAL DAILY
Qty: 16 TABLET | Refills: 5 | Status: SHIPPED | OUTPATIENT
Start: 2022-05-13

## 2022-05-13 NOTE — PROGRESS NOTES
Paperwork noting that patient may bear financial responsibility for procedure(s) performed in clinic today signed prior to proceeding with procedure(s). Furthermore, patient notified that they should contact their insurer to verify that your procedure/test has been approved and is a COVERED benefit. Although the [facility] staff does its due diligence, the insurance carrier gives the disclaimer that \"Although the procedure is authorized, this does not guarantee payment. \"    Ultimately the patient is responsible for payment.     Botox is:  [x] Buy and Bill  [] Patient Supplied

## 2022-06-06 RX ORDER — RIMEGEPANT SULFATE 75 MG/75MG
TABLET, ORALLY DISINTEGRATING ORAL
Qty: 16 TABLET | Refills: 5 | Status: SHIPPED | OUTPATIENT
Start: 2022-06-06

## 2022-07-26 ENCOUNTER — TELEPHONE (OUTPATIENT)
Dept: NEUROLOGY | Facility: CLINIC | Age: 52
End: 2022-07-26

## 2022-07-26 NOTE — TELEPHONE ENCOUNTER
PA requested for [facility]  Clinical questions answered and submitted to insurance  Awaiting coverage determination

## 2022-08-11 ENCOUNTER — TELEPHONE (OUTPATIENT)
Dept: NEUROLOGY | Facility: CLINIC | Age: 52
End: 2022-08-11

## 2022-08-11 NOTE — TELEPHONE ENCOUNTER
Called patient, advised her insurance with [employer] was not eligible. PT advised her job closed and is now on an [place] plan under her . I advised that her botox on 8/15/22 may not be able to be covered by new insurance, but will route to PA team to advise. Please advise new PA for pt botox and please call patient with any updates or denials.

## 2022-08-11 NOTE — TELEPHONE ENCOUNTER
Initiated new PA with [facility]. Patient will need to be rescheduled from 08/15/22, as insurance turnaround is no less than 5 business days. Please contact patient and send new appointment date back to PA department. Thank you.

## 2022-08-15 NOTE — TELEPHONE ENCOUNTER
Received [employer] / [employer] authorization # [id number] valid 08/11/22 - 08/11/23 - 4 visits. This is [facility]    Patient was rescheduled to 08/23/22 due to new insurance and prior authorization pending.

## 2022-08-23 ENCOUNTER — OFFICE VISIT (OUTPATIENT)
Dept: NEUROLOGY | Facility: CLINIC | Age: 52
End: 2022-08-23
Payer: COMMERCIAL

## 2022-08-23 VITALS
RESPIRATION RATE: 16 BRPM | SYSTOLIC BLOOD PRESSURE: 116 MMHG | HEART RATE: 55 BPM | HEIGHT: 66 IN | WEIGHT: 151 LBS | BODY MASS INDEX: 24.27 KG/M2 | DIASTOLIC BLOOD PRESSURE: 69 MMHG

## 2022-08-23 DIAGNOSIS — G43.011 INTRACTABLE MIGRAINE WITHOUT AURA AND WITH STATUS MIGRAINOSUS: ICD-10-CM

## 2022-08-23 DIAGNOSIS — R51.9 CHRONIC INTRACTABLE HEADACHE, UNSPECIFIED HEADACHE TYPE: Primary | ICD-10-CM

## 2022-08-23 DIAGNOSIS — G89.29 CHRONIC INTRACTABLE HEADACHE, UNSPECIFIED HEADACHE TYPE: Primary | ICD-10-CM

## 2022-08-23 PROCEDURE — 3074F SYST BP LT 130 MM HG: CPT | Performed by: OTHER

## 2022-08-23 PROCEDURE — 3008F BODY MASS INDEX DOCD: CPT | Performed by: OTHER

## 2022-08-23 PROCEDURE — 3078F DIAST BP <80 MM HG: CPT | Performed by: OTHER

## 2022-08-23 PROCEDURE — 64615 CHEMODENERV MUSC MIGRAINE: CPT | Performed by: OTHER

## 2022-08-23 RX ORDER — RIMEGEPANT SULFATE 75 MG/75MG
1 TABLET, ORALLY DISINTEGRATING ORAL EVERY OTHER DAY
Qty: 16 TABLET | Refills: 5 | Status: SHIPPED | OUTPATIENT
Start: 2022-08-23

## 2022-08-23 RX ORDER — BUTALBITAL, ACETAMINOPHEN AND CAFFEINE 50; 325; 40 MG/1; MG/1; MG/1
TABLET ORAL
Qty: 60 TABLET | Refills: 5 | Status: SHIPPED | OUTPATIENT
Start: 2022-08-23

## 2022-08-23 NOTE — PROGRESS NOTES
Paperwork noting that patient may bear financial responsibility for procedure(s) performed in clinic today signed prior to proceeding with procedure(s). Furthermore, patient notified that they should contact their insurer to verify that your procedure/test has been approved and is a COVERED benefit. Although the [facility] staff does its due diligence, the insurance carrier gives the disclaimer that \"Although the procedure is authorized, this does not guarantee payment. \"    Ultimately the patient is responsible for payment. Botox is:  [x] Buy and Bill  [] Patient Supplied      Botox Reauthorization Questions:  1. Has the patient experienced a reduction in frequency of migraines since starting Botox? yes  a. If yes, by what percentage? 50%  2. Has the intensity of migraines decreased since starting Botox? yes  a. If yes, by what percentage?  50%

## 2022-09-12 ENCOUNTER — OFFICE VISIT (OUTPATIENT)
Dept: NEUROLOGY | Facility: CLINIC | Age: 52
End: 2022-09-12
Payer: COMMERCIAL

## 2022-09-12 VITALS
HEART RATE: 54 BPM | HEIGHT: 66 IN | BODY MASS INDEX: 24.27 KG/M2 | DIASTOLIC BLOOD PRESSURE: 70 MMHG | RESPIRATION RATE: 16 BRPM | SYSTOLIC BLOOD PRESSURE: 104 MMHG | WEIGHT: 151 LBS

## 2022-09-12 DIAGNOSIS — I73.9 VASOSPASM (HCC): ICD-10-CM

## 2022-09-12 DIAGNOSIS — I77.71 CAROTID ARTERY DISSECTION (HCC): ICD-10-CM

## 2022-09-12 DIAGNOSIS — G43.701 CHRONIC MIGRAINE WITHOUT AURA WITH STATUS MIGRAINOSUS, NOT INTRACTABLE: Primary | ICD-10-CM

## 2022-09-12 DIAGNOSIS — I25.42 DISSECTION OF CORONARY ARTERY: ICD-10-CM

## 2022-09-12 DIAGNOSIS — Z15.89 MTHFR MUTATION: ICD-10-CM

## 2022-09-15 ENCOUNTER — TELEPHONE (OUTPATIENT)
Dept: NEUROLOGY | Facility: CLINIC | Age: 52
End: 2022-09-15

## 2022-09-15 NOTE — TELEPHONE ENCOUNTER
More with [employer] calling -- has DOS 5/13/22 - botox administered. Total dosage, vial size, and how much was wasted. \    Please advise.

## 2022-09-15 NOTE — TELEPHONE ENCOUNTER
Representative calling from insurance inquiring about patient's 5/13/2022 Botox appointment. Patient was given 155 units/ 45 units wasted from a 200 units Botox vial.    This is the only information requested. No further questions from representative.

## 2022-10-03 ENCOUNTER — TELEPHONE (OUTPATIENT)
Dept: NEUROLOGY | Facility: CLINIC | Age: 52
End: 2022-10-03

## 2022-10-03 DIAGNOSIS — G43.701 CHRONIC MIGRAINE WITHOUT AURA WITH STATUS MIGRAINOSUS, NOT INTRACTABLE: Primary | ICD-10-CM

## 2022-10-03 NOTE — TELEPHONE ENCOUNTER
PA requested for [facility]  Clinical questions answered and submitted to insurance  Awaiting coverage determination

## 2022-10-20 DIAGNOSIS — G89.29 CHRONIC INTRACTABLE HEADACHE, UNSPECIFIED HEADACHE TYPE: ICD-10-CM

## 2022-10-20 DIAGNOSIS — R51.9 CHRONIC INTRACTABLE HEADACHE, UNSPECIFIED HEADACHE TYPE: ICD-10-CM

## 2022-10-20 RX ORDER — ZONISAMIDE 100 MG/1
CAPSULE ORAL
Qty: 90 CAPSULE | Refills: 3 | Status: SHIPPED | OUTPATIENT
Start: 2022-10-20

## 2022-12-05 NOTE — TELEPHONE ENCOUNTER
Pt called to get status update of nurtec pa. Advised have not heard back from insurance since October, 2022. Pt is out of [facility] for 2 weeks and hoping to get it refilled asap. Advised will let clinical know. Pt will also call [employer]. Call pt with update on pa for [facility].

## 2022-12-05 NOTE — TELEPHONE ENCOUNTER
Patient out of [facility] for two weeks, is having almost daily migraines. Patient has OV on Thursday 12/8/2022. Patient to  samples in office on 12/6/2022, ok'd with Sergio Jeong PA-C. Patient to follow up with her insurance. Notified Aj RATLIFF

## 2022-12-05 NOTE — TELEPHONE ENCOUNTER
Received call from [facility] @ [employer] ([phone]), stating she is working with [employer] to get PA done and was   Advised to call to [employer] @ [phone] to determine what added supportive documentaion might be faxed to expedite appeal process.   Spoke to Good @ [employer] [facility] who states she will send an email to the appeal department to expedite the appeal.

## 2022-12-06 RX ORDER — RIMEGEPANT SULFATE 75 MG/75MG
75 TABLET, ORALLY DISINTEGRATING ORAL AS NEEDED
Qty: 8 TABLET | Refills: 0 | Status: SHIPPED | COMMUNITY
Start: 2022-12-06 | End: 2022-12-06

## 2022-12-06 RX ORDER — RIMEGEPANT SULFATE 75 MG/75MG
75 TABLET, ORALLY DISINTEGRATING ORAL AS NEEDED
Qty: 8 TABLET | Refills: 0 | COMMUNITY
Start: 2022-12-06 | End: 2023-01-05

## 2022-12-06 NOTE — TELEPHONE ENCOUNTER
Received fax from [address] received for patient use of Nurtec   Approval granted: 12/6/22-12/6/23        Pt notified

## 2022-12-06 NOTE — TELEPHONE ENCOUNTER
[facility] RN advised we resubmitted the PA and would keep her posted about the status of the PA with the patient. Patient was given 10 pills. RN advised patient to call if she runs out of samples. Patient verbalized understanding and did not have any further questions.

## 2022-12-08 ENCOUNTER — TELEPHONE (OUTPATIENT)
Dept: NEUROLOGY | Facility: CLINIC | Age: 52
End: 2022-12-08

## 2022-12-08 NOTE — TELEPHONE ENCOUNTER
Pt cx her botox for today because her ins didn't pay her last botox injection  She wants to know if we know why they didn't pay for it ?

## 2022-12-08 NOTE — TELEPHONE ENCOUNTER
She would need to contact her insurance for the coordination of benefits as unsure of what they didn't pay for. Was if the drug, was it the office visit - administration of drug, did it go to deductible?       We have authorization from [employer] authorization # [id number] valid 08/11/22 thru 08/11/23 - 4 visits

## 2023-03-08 DIAGNOSIS — R51.9 CHRONIC INTRACTABLE HEADACHE, UNSPECIFIED HEADACHE TYPE: ICD-10-CM

## 2023-03-08 DIAGNOSIS — G89.29 CHRONIC INTRACTABLE HEADACHE, UNSPECIFIED HEADACHE TYPE: ICD-10-CM

## 2023-03-08 DIAGNOSIS — G43.011 INTRACTABLE MIGRAINE WITHOUT AURA AND WITH STATUS MIGRAINOSUS: ICD-10-CM

## 2023-03-09 RX ORDER — BUTALBITAL, ACETAMINOPHEN AND CAFFEINE 50; 325; 40 MG/1; MG/1; MG/1
TABLET ORAL
Qty: 60 TABLET | Refills: 5 | Status: SHIPPED | OUTPATIENT
Start: 2023-03-09

## 2023-04-17 RX ORDER — RIMEGEPANT SULFATE 75 MG/75MG
TABLET, ORALLY DISINTEGRATING ORAL
Qty: 16 TABLET | Refills: 4 | Status: SHIPPED | OUTPATIENT
Start: 2023-04-17

## 2023-07-05 ENCOUNTER — TELEPHONE (OUTPATIENT)
Dept: NEUROLOGY | Facility: CLINIC | Age: 53
End: 2023-07-05

## 2023-07-05 DIAGNOSIS — R51.9 PERSISTENT HEADACHES: Primary | ICD-10-CM

## 2023-07-05 DIAGNOSIS — M54.2 NECK PAIN: ICD-10-CM

## 2023-07-05 DIAGNOSIS — Z86.79 HX OF ARTERIAL DISSECTION: ICD-10-CM

## 2023-07-05 RX ORDER — METHYLPREDNISOLONE 4 MG/1
TABLET ORAL
Qty: 1 EACH | Refills: 0 | Status: SHIPPED | OUTPATIENT
Start: 2023-07-05

## 2023-07-05 NOTE — TELEPHONE ENCOUNTER
Spoke to patient and is having headaches off and on for the last 2 weeks. She had an vision  disturbance of kaleidescope with wiggly lines in her vision for 30-40 minutes with associated headache one day. She does not typically have a vision disturbance with her headaches. The headaches are described as a squeezing pain. No photophobia,No phonophobia, No nausea, No vomiting. She is having neck pain and pain into the right arm. She took a nurtec yesterday and then again today. The headache pain is duller. She verified and her defibrillator is not MRI compatible. Will get CTA Brain/Neck.

## 2023-07-05 NOTE — TELEPHONE ENCOUNTER
Patient calling, advised needs follow up with Dr. Walker Schwartz - scheduled for first available 10/30/22. States that she has begun getting headaches again since stopping botox last year. Notes they are worsening and had a very bad ocular headache yesterday. Also states she has been experiencing Right arm and neck pain/sensitivity and is hesitant to lift things because she knows it will worsen pain. Please call to advise.

## 2023-07-05 NOTE — TELEPHONE ENCOUNTER
Acute Migraine assessment:    Is this your typical migraine? Describe any change in character from past migraines. Yes: had one period of ocular migraine with R eye \"psychedelic colors, lines like a kaleidescope\" that lasted 3-4 minutes but HA remained. Location of Pain (select all that apply):  right side, neck, and top of head   # Days pain has been present:  2 weeks intermittent, more days with pain than not    Describe the pain:  Pressure  Intensity of the headaches:    With medication: MODERATE   Without medication MODERATE  Associated Symptoms (check all that apply):  Nausea/Upset Stomach, Sensitivity to light, and one period of R side vision issues with R arm pain and R neck soreness. Non-pharmaceutical interventions tried and outcome:   [x] Lying down / sleeping:  BETTER  [x] Being in a dark quiet room:  BETTER  [] Massage your head:   [] Cold pack on your head/neck:    [x] Hot pack on your head/neck:  BETTER  [] Other:     Abortive Medications tried:  Nurtec, FIORICET  Current preventative medication list:  ZOMIG  Any missed doses? No  Any other relevant information:    Had BOTOX with some success until last year when insurance failed to cover so canceled next procedure. States she never got a clear answer from [employer] and did not pursue. Any medications contraindicated? Tried and failed?  no    Willing to try Medrol Dosepak? Yes  Last taken:  06/23/2020  Willing to try Toradol/Decadron injections? yes  Last taken:  NA  Advised patient to seek immediate medical treatment in ED for any concerning symptoms or changes to condition.

## 2023-10-29 DIAGNOSIS — G43.011 INTRACTABLE MIGRAINE WITHOUT AURA AND WITH STATUS MIGRAINOSUS: ICD-10-CM

## 2023-10-29 DIAGNOSIS — G89.29 CHRONIC INTRACTABLE HEADACHE, UNSPECIFIED HEADACHE TYPE: ICD-10-CM

## 2023-10-29 DIAGNOSIS — R51.9 CHRONIC INTRACTABLE HEADACHE, UNSPECIFIED HEADACHE TYPE: ICD-10-CM

## 2023-10-30 ENCOUNTER — OFFICE VISIT (OUTPATIENT)
Dept: NEUROLOGY | Facility: CLINIC | Age: 53
End: 2023-10-30

## 2023-10-30 ENCOUNTER — LAB ENCOUNTER (OUTPATIENT)
Dept: LAB | Age: 53
End: 2023-10-30
Attending: INTERNAL MEDICINE
Payer: COMMERCIAL

## 2023-10-30 ENCOUNTER — TELEPHONE (OUTPATIENT)
Dept: NEUROLOGY | Facility: CLINIC | Age: 53
End: 2023-10-30

## 2023-10-30 VITALS
DIASTOLIC BLOOD PRESSURE: 72 MMHG | HEIGHT: 66 IN | RESPIRATION RATE: 16 BRPM | BODY MASS INDEX: 24.27 KG/M2 | WEIGHT: 151 LBS | SYSTOLIC BLOOD PRESSURE: 112 MMHG | HEART RATE: 68 BPM

## 2023-10-30 DIAGNOSIS — I25.42 DISSECTION OF CORONARY ARTERY: Primary | ICD-10-CM

## 2023-10-30 DIAGNOSIS — G43.E01 CHRONIC MIGRAINE WITH AURA AND WITH STATUS MIGRAINOSUS, NOT INTRACTABLE: ICD-10-CM

## 2023-10-30 DIAGNOSIS — I77.71 CAROTID ARTERY DISSECTION (HCC): ICD-10-CM

## 2023-10-30 DIAGNOSIS — Z95.810 AUTOMATIC IMPLANTABLE CARDIAC DEFIBRILLATOR IN SITU: ICD-10-CM

## 2023-10-30 DIAGNOSIS — Z01.818 PREOPERATIVE EXAMINATION, UNSPECIFIED: Primary | ICD-10-CM

## 2023-10-30 DIAGNOSIS — G43.E01 CHRONIC MIGRAINE WITH AURA AND WITH STATUS MIGRAINOSUS, NOT INTRACTABLE: Primary | ICD-10-CM

## 2023-10-30 DIAGNOSIS — G43.011 INTRACTABLE MIGRAINE WITHOUT AURA AND WITH STATUS MIGRAINOSUS: ICD-10-CM

## 2023-10-30 LAB
ANION GAP SERPL CALC-SCNC: 2 MMOL/L (ref 0–18)
BUN BLD-MCNC: 10 MG/DL (ref 7–18)
CALCIUM BLD-MCNC: 8.7 MG/DL (ref 8.5–10.1)
CHLORIDE SERPL-SCNC: 110 MMOL/L (ref 98–112)
CO2 SERPL-SCNC: 29 MMOL/L (ref 21–32)
CREAT BLD-MCNC: 0.82 MG/DL
EGFRCR SERPLBLD CKD-EPI 2021: 85 ML/MIN/1.73M2 (ref 60–?)
ERYTHROCYTE [DISTWIDTH] IN BLOOD BY AUTOMATED COUNT: 13.2 %
FASTING STATUS PATIENT QL REPORTED: YES
GLUCOSE BLD-MCNC: 84 MG/DL (ref 70–99)
HCT VFR BLD AUTO: 43.4 %
HGB BLD-MCNC: 14.2 G/DL
MCH RBC QN AUTO: 29.3 PG (ref 26–34)
MCHC RBC AUTO-ENTMCNC: 32.7 G/DL (ref 31–37)
MCV RBC AUTO: 89.7 FL
OSMOLALITY SERPL CALC.SUM OF ELEC: 290 MOSM/KG (ref 275–295)
PLATELET # BLD AUTO: 199 10(3)UL (ref 150–450)
POTASSIUM SERPL-SCNC: 4.4 MMOL/L (ref 3.5–5.1)
RBC # BLD AUTO: 4.84 X10(6)UL
SODIUM SERPL-SCNC: 141 MMOL/L (ref 136–145)
WBC # BLD AUTO: 4.5 X10(3) UL (ref 4–11)

## 2023-10-30 PROCEDURE — 3074F SYST BP LT 130 MM HG: CPT | Performed by: OTHER

## 2023-10-30 PROCEDURE — 3078F DIAST BP <80 MM HG: CPT | Performed by: OTHER

## 2023-10-30 PROCEDURE — 80048 BASIC METABOLIC PNL TOTAL CA: CPT

## 2023-10-30 PROCEDURE — 36415 COLL VENOUS BLD VENIPUNCTURE: CPT

## 2023-10-30 PROCEDURE — 85027 COMPLETE CBC AUTOMATED: CPT

## 2023-10-30 PROCEDURE — 99214 OFFICE O/P EST MOD 30 MIN: CPT | Performed by: OTHER

## 2023-10-30 PROCEDURE — 3008F BODY MASS INDEX DOCD: CPT | Performed by: OTHER

## 2023-10-30 RX ORDER — ZONISAMIDE 100 MG/1
100 CAPSULE ORAL DAILY
Qty: 90 CAPSULE | Refills: 3 | Status: SHIPPED | OUTPATIENT
Start: 2023-10-30

## 2023-10-30 RX ORDER — RIMEGEPANT SULFATE 75 MG/75MG
1 TABLET, ORALLY DISINTEGRATING ORAL DAILY
Qty: 16 TABLET | Refills: 5 | Status: SHIPPED | OUTPATIENT
Start: 2023-10-30

## 2023-10-30 RX ORDER — BUTALBITAL, ACETAMINOPHEN AND CAFFEINE 50; 325; 40 MG/1; MG/1; MG/1
TABLET ORAL
Qty: 60 TABLET | Refills: 5 | Status: SHIPPED | OUTPATIENT
Start: 2023-10-30

## 2023-10-30 NOTE — TELEPHONE ENCOUNTER
Medication: NURTEC 75 MG & Butalbital 50/325/40 mg     Date of last refill: 03/09/2023  & 04/17/2023 with 5 addt refills  [place]     Last office visit: 9/12/22  Due back to clinic per last office note:  Nov per Botox  Date next office visit scheduled:  10/30/2023  No future appointments. Last OV note recommendation:     Problem/s Identified this visit:   Chronic migraine without aura with status migrainosus, not intractable  (primary encounter diagnosis)  Vasospasm (HCC)  MTHFR mutation  Carotid artery dissection (HCC)  Dissection of coronary artery        Discussion plus Diagnostics & Treatment Orders:  Continue BOTOX regimen  NURTEC is necessary as all other migraine medications in the past are not effective as much        (x) Discussed potential side effects of any treatment relevant to above. Includes explanation of tests as necessary.      See her in November for scheduled BOTOX<  Provide extra doses to semispinalis and splenius Capitis

## 2023-10-30 NOTE — TELEPHONE ENCOUNTER
Jennifer Bello MD sent to [facility] Friday Nurse; Corinne Bella Pls get BOTOX PA for chronic migraine    Please place a referral

## 2023-10-31 NOTE — TELEPHONE ENCOUNTER
Called insurance [phone] care coordinators [facility]. CPT codes  and [address]    Approval [id number] this is buy and bill 2 visits from 10/31/23-4/28/24    Once Botox available call patient for appointment. Please notify the PA team of the date.     [phone]

## 2023-10-31 NOTE — TELEPHONE ENCOUNTER
Information given to botox coordinator. Will give information to front staff to schedule once Botox available.

## 2023-11-03 NOTE — PAT NURSING NOTE
PreOp Instructions     You are scheduled for: a Cardiac Procedure     Date of Procedure: 11/07/23. Tentative arriival time of 1:00 pm     Diet Instructions: Do not eat or drink anything after midnight     Medications: Medications you are allowed to take can be taken with a sip of water the morning of your procedure     Do not take the following Blood Thinner(s): last dsoe of xarelto 11/6     Skin Prep: Shower with antibacterial soap using a clean washcloth, prior to procedure     Arrival Time: You will receive a call the afternoon before your procedure after 3 pm on what time you should arrive the day of your procedure    Driving After Procedure: If sedation is given, you WILL NOT be able to drive home. You will need a responsible adult  to drive you home. Discharge Teaching: Your nurse will give you specific instructions before discharge; Any questions, please call the physician's office; Most people can resume normal activities in 2-3 days        Park in the [place] parking lot. Check in at the [place] reception desk. Our  will be there to check you in for your procedure. Please bring your insurance cards and ID with you.  [employer] parking is available starting at 5 am.

## 2023-11-06 ENCOUNTER — OFFICE VISIT (OUTPATIENT)
Dept: NEUROLOGY | Facility: CLINIC | Age: 53
End: 2023-11-06
Payer: COMMERCIAL

## 2023-11-06 VITALS
HEART RATE: 64 BPM | BODY MASS INDEX: 24.27 KG/M2 | SYSTOLIC BLOOD PRESSURE: 124 MMHG | WEIGHT: 151 LBS | HEIGHT: 66 IN | DIASTOLIC BLOOD PRESSURE: 60 MMHG | RESPIRATION RATE: 16 BRPM

## 2023-11-06 DIAGNOSIS — G43.E01 CHRONIC MIGRAINE WITH AURA AND WITH STATUS MIGRAINOSUS, NOT INTRACTABLE: Primary | ICD-10-CM

## 2023-11-06 NOTE — PROGRESS NOTES
Paperwork noting that patient may bear financial responsibility for procedure(s) performed in clinic today signed prior to proceeding with procedure(s). Furthermore, patient notified that they should contact their insurer to verify that your procedure/test has been approved and is a COVERED benefit. Although the [facility] staff does its due diligence, the insurance carrier gives the disclaimer that \"Although the procedure is authorized, this does not guarantee payment. \"    Ultimately the patient is responsible for payment. Botox is:  [x] Buy and Bill  [] Patient Supplied      Botox Reauthorization Questions:  Has the patient experienced a reduction in frequency of migraines since starting Botox? yes  If yes, by what percentage? 75%  Has the intensity of migraines decreased since starting Botox? yes  If yes, by what percentage?  90%

## 2023-11-07 ENCOUNTER — HOSPITAL ENCOUNTER (OUTPATIENT)
Dept: INTERVENTIONAL RADIOLOGY/VASCULAR | Facility: HOSPITAL | Age: 53
Discharge: HOME | End: 2023-11-07
Attending: INTERNAL MEDICINE | Admitting: INTERNAL MEDICINE
Payer: COMMERCIAL

## 2023-11-07 VITALS
HEIGHT: 66 IN | DIASTOLIC BLOOD PRESSURE: 73 MMHG | SYSTOLIC BLOOD PRESSURE: 107 MMHG | BODY MASS INDEX: 21.69 KG/M2 | RESPIRATION RATE: 12 BRPM | WEIGHT: 135 LBS | TEMPERATURE: 98 F | HEART RATE: 54 BPM | OXYGEN SATURATION: 100 %

## 2023-11-07 DIAGNOSIS — Z45.02 IMPLANTABLE CARDIOVERTER-DEFIBRILLATOR (ICD) GENERATOR END OF LIFE: ICD-10-CM

## 2023-11-07 LAB — B-HCG UR QL: NEGATIVE

## 2023-11-07 PROCEDURE — 0JPT0PZ REMOVAL OF CARDIAC RHYTHM RELATED DEVICE FROM TRUNK SUBCUTANEOUS TISSUE AND FASCIA, OPEN APPROACH: ICD-10-PCS | Performed by: INTERNAL MEDICINE

## 2023-11-07 PROCEDURE — 81025 URINE PREGNANCY TEST: CPT

## 2023-11-07 PROCEDURE — 99152 MOD SED SAME PHYS/QHP 5/>YRS: CPT | Performed by: INTERNAL MEDICINE

## 2023-11-07 PROCEDURE — 33262 RMVL& REPLC PULSE GEN 1 LEAD: CPT | Performed by: INTERNAL MEDICINE

## 2023-11-07 PROCEDURE — 99153 MOD SED SAME PHYS/QHP EA: CPT | Performed by: INTERNAL MEDICINE

## 2023-11-07 PROCEDURE — 0JH608Z INSERTION OF DEFIBRILLATOR GENERATOR INTO CHEST SUBCUTANEOUS TISSUE AND FASCIA, OPEN APPROACH: ICD-10-PCS | Performed by: INTERNAL MEDICINE

## 2023-11-07 RX ORDER — VANCOMYCIN HYDROCHLORIDE 1 G/20ML
INJECTION, POWDER, LYOPHILIZED, FOR SOLUTION INTRAVENOUS
Status: COMPLETED
Start: 2023-11-07 | End: 2023-11-07

## 2023-11-07 RX ORDER — LIDOCAINE HYDROCHLORIDE 10 MG/ML
INJECTION, SOLUTION EPIDURAL; INFILTRATION; INTRACAUDAL; PERINEURAL
Status: COMPLETED
Start: 2023-11-07 | End: 2023-11-07

## 2023-11-07 RX ORDER — SODIUM CHLORIDE 9 MG/ML
INJECTION, SOLUTION INTRAVENOUS
Status: DISCONTINUED | OUTPATIENT
Start: 2023-11-08 | End: 2023-11-07 | Stop reason: HOSPADM

## 2023-11-07 RX ORDER — KETOROLAC TROMETHAMINE 30 MG/ML
30 INJECTION, SOLUTION INTRAMUSCULAR; INTRAVENOUS EVERY 6 HOURS PRN
Status: DISCONTINUED | OUTPATIENT
Start: 2023-11-07 | End: 2023-11-07

## 2023-11-07 RX ORDER — CHLORHEXIDINE GLUCONATE 4 G/100ML
30 SOLUTION TOPICAL
Status: DISCONTINUED | OUTPATIENT
Start: 2023-11-08 | End: 2023-11-07 | Stop reason: HOSPADM

## 2023-11-07 RX ORDER — KETOROLAC TROMETHAMINE 30 MG/ML
30 INJECTION, SOLUTION INTRAMUSCULAR; INTRAVENOUS ONCE
Status: COMPLETED | OUTPATIENT
Start: 2023-11-07 | End: 2023-11-07

## 2023-11-07 RX ORDER — ONDANSETRON 2 MG/ML
4 INJECTION INTRAMUSCULAR; INTRAVENOUS EVERY 6 HOURS PRN
Status: DISCONTINUED | OUTPATIENT
Start: 2023-11-07 | End: 2023-11-07

## 2023-11-07 RX ORDER — MIDAZOLAM HYDROCHLORIDE 1 MG/ML
INJECTION INTRAMUSCULAR; INTRAVENOUS
Status: COMPLETED
Start: 2023-11-07 | End: 2023-11-07

## 2023-11-07 RX ORDER — KETOROLAC TROMETHAMINE 30 MG/ML
INJECTION, SOLUTION INTRAMUSCULAR; INTRAVENOUS
Status: COMPLETED
Start: 2023-11-07 | End: 2023-11-07

## 2023-11-07 RX ORDER — ONDANSETRON 2 MG/ML
INJECTION INTRAMUSCULAR; INTRAVENOUS
Status: COMPLETED
Start: 2023-11-07 | End: 2023-11-07

## 2023-11-07 RX ADMIN — KETOROLAC TROMETHAMINE 30 MG: 30 INJECTION, SOLUTION INTRAMUSCULAR; INTRAVENOUS at 17:45:00

## 2023-11-07 NOTE — DISCHARGE INSTRUCTIONS
Resume your Amando Cullenlist on Thursday, November 9, 2023. HOME CARE INSTRUCTIONS FOLLOWING INSERTION OF PERMANENT PACEMAKER (PPM) OR INTERNAL CARDIAC DEFIBRILLATOR (ICD)      Activity:  - DO NOT drive after the procedure. You can drive on Friday. - Plan on resting and relaxing tonight and tomorrow. - Avoid drinking alcohol for the next 24 hours. - Do not lift anything over 10 pounds for the next 4 weeks. - Consult with your cardiologist about when you can return to work. What is Normal:  - A small amount of drainage on the dressing/bandage  - Soreness at the procedure site    Special Instructions:  - Keep the dressing on for 5 days. After 5 days remove the dressing.  - You should shower after the dressing is removed and wash the procedure site gently with soap and water. - Keep the procedure site clean and dry. - Do not submerge the procedure site in water or take a tub bath. - The white strips of tape placed over you procedure site will gradually fall off over the next week or two. Do not remove them on your own. When you should NOTIFY YOUR PHYSICIAN:  - If you have shortness of breath or a persistent cough  - If you have chest pain (angina)  - If you have persistent pain at the procedure site  - If you experience a fever with a temperature >101 degrees Fahrenheit, chills, infection (redness, swelling, thick yellow drainage, or a foul odor from the procedure site)  - If an ICD was inserted, call if the device \"fires\"    Other:  - Call your physician's office for a follow-up appointment. You should be seen in 7-10 days. - If you had an ICD implanted, a procedure called an induction will be performed as an outpatient 1 month after the insertion.  - Keep the identification card in your wallet or with you at all times. - Be knowledgeable about the settings of your device. - You may use a cell phone, using the side opposite of the device. There are no limits on microwave use.  Power tools may be used but should be kept at least 1 foot away from the device. **STOP THESE ACTIVITIES IF YOU EXPERIENCE LIGHT-HEADEDNESS**  - Walk through metal detectors normally- do not hesitate. - Consult with a cardiologist prior to any MRI testing/imaging.  - Notify all family and friends that you have a device implanted. If you experience loss of consciousness, they need to call 911. - You may resume your present diet, unless otherwise specified by your physician.  - You may resume all of your medications as prescribed, unless otherwise directed by your physician. A list of your medications was provided to you at discharge.

## 2023-11-07 NOTE — PROCEDURES
OPERATION(S) PERFORMED:   1. ICD implant. 2. ICD generator removal.     : Max Bermudez MD  Pre-Op: Device at [place]  Post-Op: ICD in situ  COMPLICATIONS: None     ESTIMATED BLOOD LOSS: Minimal.  SEDATION: IV was maintained by RN. Patient was assessed by myself and the nursing staff, IV sedation (versed and fentanyl) were administered during continuous ECG, pulse oximeter, and non-invasive hemodynamic monitoring. I was present from the time of sedation being started to the end of the procedure (1715-1044). METHODS: The patient was brought to the outpatient cardiac telemetry unit in a fasting and nonsedated state after providing informed consent. IV sedation was administered during continuous ECG, pulse oximeter, and noninvasive hemodynamic monitoring. After administering 1% lidocaine for local anesthesia, an incision was adjacent to the previous incision. The plane of the incision was extended to expose the old generator and leads. The generator was removed from the pocket. The leads were visually inspected, there was no evidence of lead breakdown, the leads appeared to be intact. The pocket was irrigated with antibiotic solution. Bleeding was sought for until hemostasis was achieved. The leads were connected to a pulse generator. They were coiled and placed into the pocket along with the pulse generator. The incision was closed in 2 layers using 2-0 and 3-0 Vicryl. Steri-Strips were applied followed by a sterile dressing. The left arm was placed in a sling and the patient was transported to telemetry in stable condition. There were no apparent intraoperative complications. CONCLUSIONS:   1. Status post successful generator change of a single chamber ICD. 2. Adequate/stable RA, RV pacing and sensing thresholds.        Karla Cornell MD  [facility]  Cardiac Electrophysiolgy

## 2023-11-07 NOTE — H&P
[facility]  H&P    Shahnaz Cui Patient Status:  Outpatient    1970 MRN [id number]   Location [address] Attending Brandee Estrada MD   Hosp Day # 0 PCP Breana Duran DO     Reason for Admission:  ICD implant    Assessment/Plan:  Patient Active Problem List   Diagnosis    Chondromalacia of patella    Chronic headache    Ventricular fibrillation (HCC)    Migraines    Other pulmonary embolism and infarction    Vasospasm (HCC)    Vasculitis ([address]    Cardiomyopathy ([address]    Thyromegaly    Secondary hypercoagulability disorder ([address]    Spastic colon    Tricuspid valve disorder    Edema    Hyperlipidemia    Automatic implantable cardioverter-defibrillator in situ    Dissection of coronary artery    Chest pain    MTHFR mutation    Carotid artery dissection (HCC)    Neck pain    Chronic intractable headache    Intractable episodic paroxysmal hemicrania    History of carotid artery dissection    Hoarse voice quality    Iliac artery dissection (HCC)    Fibromuscular dysplasia ([address]    Adrenal adenoma       Pt presents for ICD implant for secondary prevention. Pt is NYHA class 1    Discussed risks and benefits of procedure including bleeding, infection, tamponade, pneumothorax, the need for lead revision and rare chance of life threatening complication. ICD consult included physician and patient participation using Shared Decision making tools (such as [place] tool) via printout or video. History of Present Illness:  Shahnaz Cui is a a(n) 46year old female. Presents for ICD implant. History:  Past Medical History:   Diagnosis Date    Abdominal pain     AICD (automatic cardioverter/defibrillator) present     Automatic implantable cardiac defibrillator in situ     Blood clot in vein     Cardiac arrest [facility])     Cardiac defibrillator in place     Cardiomyopathy [facility])     Resolved.      Chronic headaches     Dissection of coronary artery     DVT (deep venous thrombosis) ([facility]     Edema     H/O arterial dissection     LAD    Hemorrhoids     Hoarseness, chronic     Hyperlipidemia     Migraines     Other pulmonary embolism and infarction     Postpartum, Aug 2005. PONV (postoperative nausea and vomiting)     Pulmonary embolism ([facility])     Secondary hypercoagulability disorder ([facility])     Protein S deficiency, W/heterzygous MTHFR mutation. Spastic colon     Stented coronary artery     Thyromegaly     Tricuspid valve disorder     Vasculitis ([facility])     Involving cerebral vascular bed & R carotid. Vasospasm ([address]     During catheter introduction in R coronary artery, Jan 2007. Ventricular fibrillation ([address]     Arrest, 2 weeks post delivery of 4th child, Aug 2005. Wears glasses      Past Surgical History:   Procedure Laterality Date    ANGIOPLASTY (CORONARY)  2005     2.25x x 18mm Vision x 2 stents in [place]     ANGIOPLASTY (CORONARY)  2007    2.5 x 8 mm Vision in [address]      COLONOSCOPY      EGD      HYSTERECTOMY  07/2021    OTHER SURGICAL HISTORY  8/2005, 1/2009    stents, ICD, mini vision     OTHER SURGICAL HISTORY  8/2005    ICD implantation     Family History   Problem Relation Age of Onset    Diabetes Other         grandmother     Cancer Other         grandmother     Stroke Other         grandmother     Heart Disease Other         grandmother     Hypertension Father     Hypertension Mother     Colon Polyps Mother     Other (VSD & ASD) Other         child born with, repaired at birth     Colon Cancer Maternal Grandmother     Diabetes Maternal Grandmother     Hypertension Maternal Grandmother     Heart Attack Maternal Grandmother     Stroke Maternal Grandmother       reports that she has never smoked. She has never been exposed to tobacco smoke. She has never used smokeless tobacco. She reports current alcohol use. She reports that she does not use drugs. Allergies:   Allergies   Allergen Reactions Clavulanic Acid HIVES    Augmentin [Amoxicillin-Pot Clavulanate] HIVES    Cephalexin OTHER (SEE COMMENTS)     Oral, hives    Keflex [Cephalexin Monohydrate] HIVES       Medications:  No current facility-administered medications for this encounter. Review of Systems:  All systems were reviewed and are negative except as described above in HPI. Physical Exam:  Height 66\", weight 135 lb (61.2 kg), not currently breastfeeding. No data recorded. Wt Readings from Last 3 Encounters:   11/03/23 135 lb (61.2 kg)   11/06/23 151 lb (68.5 kg)   10/30/23 151 lb (68.5 kg)       Telemetry:   General: Alert and oriented in no apparent distress. HEENT: No focal deficits. Neck: No JVD, carotids 2+ no bruits. Cardiac: Regular rate and rhythm, S1, S2 normal, no murmur, rub or gallop. Lungs: Clear without wheezes, rales, rhonchi or dullness. Normal excursions and effort. Abdomen: Soft, non-tender. Extremities: Without clubbing, cyanosis or edema. Peripheral pulses are 2+. Neurologic: Alert and oriented, normal affect. Skin: Warm and dry.      Laboratories and Data:  Diagnostics:    Labs:        Lab Results   Component Value Date    PT 19.4 (H) 06/29/2013    PT 40.8 (H) 07/11/2011    PT 39.0 (H) 06/30/2011    INR 1.47 (H) 02/28/2019    INR 1.33 (H) 06/15/2017    INR 1.54 (H) 12/14/2015         Millicent Weber MD  11/7/2023  10:01 AM    Oscar Darnell MD  [facility]  Cardiac Electrophysiolgy

## 2023-11-07 NOTE — PLAN OF CARE
Patient had PPM generator change today with Dr. Gideon Ho. Left upper chest mepilex dressing in place, CDI. VSS. Patient denies any pain. Vancomycin infusing. Dr. Gideon Ho spoke with patient's . Patient's family @ bedside. Vera from indico @ bedside and set monitor up. Patient tolerating po intake. Will continue to monitor.

## 2023-11-08 NOTE — PROGRESS NOTES
Rc'd pt from RN. Aquacel to left chest is soft, clean and dry. No bleeding or hematoma. Pt denies pain at incisional site. Pt c/o headache, tordol given. Vanco completed. Headache improved, pt ready to go home. Pt tolerated po intake well. IV removed with catheter intact. Reviewed discharge instructions with pt and , verbalizes understanding. Home via w/c in stable condition. Pts  is the .

## 2023-11-14 ENCOUNTER — OFFICE VISIT (OUTPATIENT)
Dept: FAMILY MEDICINE CLINIC | Facility: CLINIC | Age: 53
End: 2023-11-14
Payer: COMMERCIAL

## 2023-11-14 ENCOUNTER — LAB ENCOUNTER (OUTPATIENT)
Dept: LAB | Age: 53
End: 2023-11-14
Attending: FAMILY MEDICINE
Payer: COMMERCIAL

## 2023-11-14 VITALS
DIASTOLIC BLOOD PRESSURE: 84 MMHG | RESPIRATION RATE: 16 BRPM | HEIGHT: 66 IN | OXYGEN SATURATION: 94 % | WEIGHT: 140 LBS | SYSTOLIC BLOOD PRESSURE: 108 MMHG | BODY MASS INDEX: 22.5 KG/M2 | HEART RATE: 78 BPM

## 2023-11-14 DIAGNOSIS — Z12.11 COLON CANCER SCREENING: ICD-10-CM

## 2023-11-14 DIAGNOSIS — Z13.820 SCREENING FOR OSTEOPOROSIS: ICD-10-CM

## 2023-11-14 DIAGNOSIS — Z00.00 ANNUAL PHYSICAL EXAM: ICD-10-CM

## 2023-11-14 DIAGNOSIS — G47.09 OTHER INSOMNIA: ICD-10-CM

## 2023-11-14 DIAGNOSIS — E04.9 GOITER: ICD-10-CM

## 2023-11-14 DIAGNOSIS — Z12.31 ENCOUNTER FOR SCREENING MAMMOGRAM FOR HIGH-RISK PATIENT: ICD-10-CM

## 2023-11-14 DIAGNOSIS — D22.9 ATYPICAL NEVI: ICD-10-CM

## 2023-11-14 DIAGNOSIS — Z00.00 ANNUAL PHYSICAL EXAM: Primary | ICD-10-CM

## 2023-11-14 LAB
ALBUMIN SERPL-MCNC: 3.8 G/DL (ref 3.4–5)
ALBUMIN/GLOB SERPL: 1.1 {RATIO} (ref 1–2)
ALP LIVER SERPL-CCNC: 87 U/L
ALT SERPL-CCNC: 24 U/L
ANION GAP SERPL CALC-SCNC: 4 MMOL/L (ref 0–18)
AST SERPL-CCNC: 23 U/L (ref 15–37)
BASOPHILS # BLD AUTO: 0.04 X10(3) UL (ref 0–0.2)
BASOPHILS NFR BLD AUTO: 1.1 %
BILIRUB SERPL-MCNC: 0.4 MG/DL (ref 0.1–2)
BUN BLD-MCNC: 7 MG/DL (ref 9–23)
CALCIUM BLD-MCNC: 9.1 MG/DL (ref 8.5–10.1)
CHLORIDE SERPL-SCNC: 109 MMOL/L (ref 98–112)
CHOLEST SERPL-MCNC: 179 MG/DL (ref ?–200)
CO2 SERPL-SCNC: 29 MMOL/L (ref 21–32)
CREAT BLD-MCNC: 0.83 MG/DL
EGFRCR SERPLBLD CKD-EPI 2021: 84 ML/MIN/1.73M2 (ref 60–?)
EOSINOPHIL # BLD AUTO: 0.17 X10(3) UL (ref 0–0.7)
EOSINOPHIL NFR BLD AUTO: 4.6 %
ERYTHROCYTE [DISTWIDTH] IN BLOOD BY AUTOMATED COUNT: 13.2 %
EST. AVERAGE GLUCOSE BLD GHB EST-MCNC: 123 MG/DL (ref 68–126)
FASTING PATIENT LIPID ANSWER: YES
FASTING STATUS PATIENT QL REPORTED: YES
GLOBULIN PLAS-MCNC: 3.6 G/DL (ref 2.8–4.4)
GLUCOSE BLD-MCNC: 86 MG/DL (ref 70–99)
HBA1C MFR BLD: 5.9 % (ref ?–5.7)
HCT VFR BLD AUTO: 44.4 %
HDLC SERPL-MCNC: 78 MG/DL (ref 40–59)
HGB BLD-MCNC: 14.4 G/DL
IMM GRANULOCYTES # BLD AUTO: 0.01 X10(3) UL (ref 0–1)
IMM GRANULOCYTES NFR BLD: 0.3 %
LDLC SERPL CALC-MCNC: 78 MG/DL (ref ?–100)
LYMPHOCYTES # BLD AUTO: 0.77 X10(3) UL (ref 1–4)
LYMPHOCYTES NFR BLD AUTO: 20.9 %
MCH RBC QN AUTO: 29.6 PG (ref 26–34)
MCHC RBC AUTO-ENTMCNC: 32.4 G/DL (ref 31–37)
MCV RBC AUTO: 91.2 FL
MONOCYTES # BLD AUTO: 0.28 X10(3) UL (ref 0.1–1)
MONOCYTES NFR BLD AUTO: 7.6 %
NEUTROPHILS # BLD AUTO: 2.41 X10 (3) UL (ref 1.5–7.7)
NEUTROPHILS # BLD AUTO: 2.41 X10(3) UL (ref 1.5–7.7)
NEUTROPHILS NFR BLD AUTO: 65.5 %
NONHDLC SERPL-MCNC: 101 MG/DL (ref ?–130)
OSMOLALITY SERPL CALC.SUM OF ELEC: 291 MOSM/KG (ref 275–295)
PLATELET # BLD AUTO: 202 10(3)UL (ref 150–450)
POTASSIUM SERPL-SCNC: 3.9 MMOL/L (ref 3.5–5.1)
PROT SERPL-MCNC: 7.4 G/DL (ref 6.4–8.2)
RBC # BLD AUTO: 4.87 X10(6)UL
SODIUM SERPL-SCNC: 142 MMOL/L (ref 136–145)
T4 FREE SERPL-MCNC: 1 NG/DL (ref 0.8–1.7)
TRIGL SERPL-MCNC: 134 MG/DL (ref 30–149)
TSI SER-ACNC: 0.56 MIU/ML (ref 0.36–3.74)
VIT B12 SERPL-MCNC: 236 PG/ML (ref 193–986)
VLDLC SERPL CALC-MCNC: 21 MG/DL (ref 0–30)
WBC # BLD AUTO: 3.7 X10(3) UL (ref 4–11)

## 2023-11-14 PROCEDURE — 3008F BODY MASS INDEX DOCD: CPT | Performed by: FAMILY MEDICINE

## 2023-11-14 PROCEDURE — 36415 COLL VENOUS BLD VENIPUNCTURE: CPT

## 2023-11-14 PROCEDURE — 3074F SYST BP LT 130 MM HG: CPT | Performed by: FAMILY MEDICINE

## 2023-11-14 PROCEDURE — 83036 HEMOGLOBIN GLYCOSYLATED A1C: CPT

## 2023-11-14 PROCEDURE — 85025 COMPLETE CBC W/AUTO DIFF WBC: CPT

## 2023-11-14 PROCEDURE — 90471 IMMUNIZATION ADMIN: CPT | Performed by: FAMILY MEDICINE

## 2023-11-14 PROCEDURE — 82607 VITAMIN B-12: CPT

## 2023-11-14 PROCEDURE — 3079F DIAST BP 80-89 MM HG: CPT | Performed by: FAMILY MEDICINE

## 2023-11-14 PROCEDURE — 99396 PREV VISIT EST AGE 40-64: CPT | Performed by: FAMILY MEDICINE

## 2023-11-14 PROCEDURE — 90677 PCV20 VACCINE IM: CPT | Performed by: FAMILY MEDICINE

## 2023-11-14 PROCEDURE — 84439 ASSAY OF FREE THYROXINE: CPT

## 2023-11-14 PROCEDURE — 84443 ASSAY THYROID STIM HORMONE: CPT

## 2023-11-14 PROCEDURE — 80053 COMPREHEN METABOLIC PANEL: CPT

## 2023-11-14 PROCEDURE — 86706 HEP B SURFACE ANTIBODY: CPT

## 2023-11-14 PROCEDURE — 80061 LIPID PANEL: CPT

## 2023-11-14 RX ORDER — TRAZODONE HYDROCHLORIDE 50 MG/1
50 TABLET ORAL NIGHTLY
Qty: 30 TABLET | Refills: 0 | Status: SHIPPED | OUTPATIENT
Start: 2023-11-14

## 2023-11-15 LAB
HBV SURFACE AB SER QL: NONREACTIVE
HBV SURFACE AB SERPL IA-ACNC: <3.1 MIU/ML

## 2023-12-07 NOTE — TELEPHONE ENCOUNTER
Pls call to advice   She is having a headache and she has no more samples of the Nurtec.
Pt was given samples of Nurtec ODT 75mg    5 Boxes of 2 pills    Lot [id number]  Exp. 10/2022    Pt is in the middle of a migraine and just received the approval for the PA of her medication. RN provided the patient with the Copay savings card as well.  Pt guy
RN spoke to patient we have 1 more sample available. Pt is going to have it picked up tomorrow. Discussed ways to relieve the migraine. Ice, rest, and her abortive. Confirmed pt can take her naratriptan during this migraine cycle.      RN will try to
15

## 2023-12-12 NOTE — TELEPHONE ENCOUNTER
Added to pt's records Detail Level: Zone Detail Level: Generalized Sunscreen Recommendations: Neutrogena Detail Level: Detailed

## 2023-12-14 ENCOUNTER — HOSPITAL ENCOUNTER (OUTPATIENT)
Dept: ULTRASOUND IMAGING | Age: 53
Discharge: HOME | End: 2023-12-14
Attending: FAMILY MEDICINE
Payer: COMMERCIAL

## 2023-12-14 ENCOUNTER — HOSPITAL ENCOUNTER (OUTPATIENT)
Dept: MAMMOGRAPHY | Age: 53
Discharge: HOME | End: 2023-12-14
Attending: FAMILY MEDICINE
Payer: COMMERCIAL

## 2023-12-14 DIAGNOSIS — E04.9 GOITER: ICD-10-CM

## 2023-12-14 DIAGNOSIS — Z12.31 ENCOUNTER FOR SCREENING MAMMOGRAM FOR HIGH-RISK PATIENT: ICD-10-CM

## 2023-12-14 PROCEDURE — 77067 SCR MAMMO BI INCL CAD: CPT | Performed by: FAMILY MEDICINE

## 2023-12-14 PROCEDURE — 77063 BREAST TOMOSYNTHESIS BI: CPT | Performed by: FAMILY MEDICINE

## 2023-12-14 PROCEDURE — 76536 US EXAM OF HEAD AND NECK: CPT | Performed by: FAMILY MEDICINE

## 2023-12-15 DIAGNOSIS — I89.9 LYMPH NODE DISORDER: Primary | ICD-10-CM

## 2024-03-27 DIAGNOSIS — G43.011 INTRACTABLE MIGRAINE WITHOUT AURA AND WITH STATUS MIGRAINOSUS: ICD-10-CM

## 2024-03-27 DIAGNOSIS — G89.29 CHRONIC INTRACTABLE HEADACHE, UNSPECIFIED HEADACHE TYPE: ICD-10-CM

## 2024-03-27 DIAGNOSIS — R51.9 CHRONIC INTRACTABLE HEADACHE, UNSPECIFIED HEADACHE TYPE: ICD-10-CM

## 2024-03-27 RX ORDER — BUTALBITAL, ACETAMINOPHEN AND CAFFEINE 50; 325; 40 MG/1; MG/1; MG/1
TABLET ORAL
Qty: 60 TABLET | Refills: 5 | Status: SHIPPED | OUTPATIENT
Start: 2024-03-27

## 2024-03-27 NOTE — TELEPHONE ENCOUNTER
Medication: butalbital-acetaminophen-caffeine -40 MG      Date of last refill: 10/30/23 (#60/5R)  [place]11/24     Last office visit: 11/6/23 Botox  Due back to clinic per last office note:  per Botox  Date next office visit scheduled:    No future appointments.        Last OV note recommendation:      IMPRESSION:  Chronic migraine with aura and with status migrainosus, not intractable  (primary encounter diagnosis)     Post injections instructions:  Expect response to start on the second or going into the 3rd week  Use ice packs and Tylenol ES if with pain at injections sites  Report any signs of infection or inflammation at site of injection  Use migraine medications the same was as before

## 2024-05-17 RX ORDER — RIMEGEPANT SULFATE 75 MG/75MG
1 TABLET, ORALLY DISINTEGRATING ORAL DAILY
Qty: 16 TABLET | Refills: 5 | Status: SHIPPED | OUTPATIENT
Start: 2024-05-17

## 2024-05-17 NOTE — TELEPHONE ENCOUNTER
Medication: Nurtec 75 mg     Date of last refill: 10/30/2023(#16/5R)  [place]/24     Last office visit: 11/6/23 Botox  Due back to clinic per last office note:  per Botox  Date next office visit scheduled:    No future appointments.        Last OV note recommendation:       IMPRESSION:  Chronic migraine with aura and with status migrainosus, not intractable  (primary encounter diagnosis)     Post injections instructions:  Expect response to start on the second or going into the 3rd week  Use ice packs and Tylenol ES if with pain at injections sites  Report any signs of infection or inflammation at site of injection  Use migraine medications the same was as before

## 2024-06-03 ENCOUNTER — TELEPHONE (OUTPATIENT)
Dept: NEUROLOGY | Facility: CLINIC | Age: 54
End: 2024-06-03

## 2024-06-03 DIAGNOSIS — R51.9 CHRONIC INTRACTABLE HEADACHE, UNSPECIFIED HEADACHE TYPE: Primary | ICD-10-CM

## 2024-06-03 DIAGNOSIS — G89.29 CHRONIC INTRACTABLE HEADACHE, UNSPECIFIED HEADACHE TYPE: Primary | ICD-10-CM

## 2024-06-03 NOTE — TELEPHONE ENCOUNTER
Patient's last appointment with the provider was November 2023.  I was told they need an appointment if it's been longer than 6 months?

## 2024-06-17 ENCOUNTER — OFFICE VISIT (OUTPATIENT)
Dept: NEUROLOGY | Facility: CLINIC | Age: 54
End: 2024-06-17
Payer: COMMERCIAL

## 2024-06-17 VITALS
HEART RATE: 76 BPM | SYSTOLIC BLOOD PRESSURE: 108 MMHG | HEIGHT: 66 IN | RESPIRATION RATE: 16 BRPM | BODY MASS INDEX: 22.5 KG/M2 | DIASTOLIC BLOOD PRESSURE: 64 MMHG | WEIGHT: 140 LBS

## 2024-06-17 DIAGNOSIS — R51.9 CHRONIC INTRACTABLE HEADACHE, UNSPECIFIED HEADACHE TYPE: Primary | ICD-10-CM

## 2024-06-17 DIAGNOSIS — G89.29 CHRONIC INTRACTABLE HEADACHE, UNSPECIFIED HEADACHE TYPE: Primary | ICD-10-CM

## 2024-06-17 PROCEDURE — 99214 OFFICE O/P EST MOD 30 MIN: CPT | Performed by: OTHER

## 2024-06-17 RX ORDER — LASMIDITAN 100 MG/1
100 TABLET ORAL ONCE AS NEEDED
Qty: 6 TABLET | Refills: 0 | Status: SHIPPED | OUTPATIENT
Start: 2024-06-17 | End: 2024-06-17

## 2024-06-17 NOTE — TELEPHONE ENCOUNTER
Patient completed office visit on 6/17/24 and advise will call her to schedule botox once prior authorization is completed

## 2024-06-17 NOTE — PATIENT INSTRUCTIONS
Refill policies:    Allow 2-3 business days for refills; controlled substances may take longer.  Contact your pharmacy at least 5 days prior to running out of medication and have them send an electronic request or submit request through the “request refill” option in your UpOut account.  Refills are not addressed on weekends; covering physicians do not authorize routine medications on weekends.  No narcotics or controlled substances are refilled after noon on Fridays or by on call physicians.  By law, narcotics must be electronically prescribed.  A 30 day supply with no refills is the maximum allowed.  If your prescription is due for a refill, you may be due for a follow up appointment.  To best provide you care, patients receiving routine medications need to be seen at least once a year.  Patients receiving narcotic/controlled substance medications need to be seen at least once every 3 months.  In the event that your preferred pharmacy does not have the requested medication in stock (e.g. Backordered), it is your responsibility to find another pharmacy that has the requested medication available.  We will gladly send a new prescription to that pharmacy at your request.    Scheduling Tests:    If your physician has ordered radiology tests such as MRI or CT scans, please contact Central Scheduling at [phone] right away to schedule the test.  Once scheduled, the [facility] Centralized Referral Team will work with your insurance carrier to obtain pre-certification or prior authorization.  Depending on your insurance carrier, approval may take 3-10 days.  It is highly recommended patients assure they have received an authorization before having a test performed.  If test is done without insurance authorization, patient may be responsible for the entire amount billed.      Precertification and Prior Authorizations:  If your physician has recommended that you have a procedure or additional testing performed the [facility]  Centralized Referral Team will contact your insurance carrier to obtain pre-certification or prior authorization.    You are strongly encouraged to contact your insurance carrier to verify that your procedure/test has been approved and is a COVERED benefit.  Although the [facility] Centralized Referral Team does its due diligence, the insurance carrier gives the disclaimer that \"Although the procedure is authorized, this does not guarantee payment.\"    Ultimately the patient is responsible for payment.   Thank you for your understanding in this matter.  Paperwork Completion:  If you require FMLA or disability paperwork for your recovery, please make sure to either drop it off or have it faxed to our office at [phone]. Be sure the form has your name and date of birth on it.  The form will be faxed to our Forms Department and they will complete it for you.  There is a 25$ fee for all forms that need to be filled out.  Please be aware there is a 10-14 day turnaround time.  You will need to sign a release of information (ORION) form if your paperwork does not come with one.  You may call the Forms Department with any questions at [phone].  Their fax number is [phone].

## 2024-06-17 NOTE — PROGRESS NOTES
NEUROLOGY  [facility]       Palak CARSON Agustina  2/17/1970  Primary Care Provider:  Carmen Carrillo,     6/17/2024  54 year old yo,  was last seen on:: Nov for BOTOX injections    Seen for/plans last visit:  Chronic MIGRAINe    Previous visit and existing record notes reviewed in preparation for the face to face visit.  Relevant labs and studies reviewed and will be noted in relevant areas of this record.  Accompanied visit:     (x) No.      Present condition:  Botox was stopped because of Insurance coverage  Since then the HA have become more frequent.    Nurtec works and takes it every other day but then HA starts and she may take it once a day prn sometimes augmenting it with Fioricet    One time we gave samples of REYVOW which helped with the very severe HA    Past History update/new problem(s): no new problem    Review of Systems:  Review of Systems:  Denies systemic symptoms     No CP or SOB.  No GI or  symptoms. Relevant Neuro as noted above.      Medications:      Current Outpatient Medications:     Lasmiditan Succinate (REYVOW) 100 MG Oral Tab, Take 100 mg by mouth once as needed., Disp: 6 tablet, Rfl: 0    NURTEC 75 MG Oral Tablet Dispersible, PLACE 1 TABLET INSIDE CHEEK DAILY. DISSOLVE ON THE TONGUE, Disp: 16 tablet, Rfl: 5    butalbital-acetaminophen-caffeine -40 MG Oral Tab, TAKE 1 TO 2 TABLETS BY MOUTH EVERY 4 TO 6 HOURS AS NEEDED FOR HEADACHE, Disp: 60 tablet, Rfl: 5    zonisamide 100 MG Oral Cap, Take 1 capsule (100 mg total) by mouth daily., Disp: 90 capsule, Rfl: 3    atorvastatin 40 MG Oral Tab, Take 1 tablet (40 mg total) by mouth nightly., Disp: 90 tablet, Rfl: 3    XARELTO 20 MG Oral Tab, TAKE 1 TABLET BY MOUTH DAILY, Disp: 90 tablet, Rfl: 3    amLODIPine Besylate (NORVASC) 5 MG Oral Tab, Take 1 tablet (5 mg total) by mouth daily., Disp: 90 tablet, Rfl: 3  PRN:     Allergies:  Allergies   Allergen Reactions    Clavulanic Acid HIVES    Augmentin [Amoxicillin-Pot  Clavulanate] HIVES    Cephalexin OTHER (SEE COMMENTS)     Oral, hives    Keflex [Cephalexin Monohydrate] HIVES          EXAM:  /64 (BP Location: Left arm, Patient Position: Sitting, Cuff Size: adult)   Pulse 76   Resp 16   Ht 66\"   Wt 140 lb (63.5 kg)   BMI 22.60 kg/m²   Looks stated age  General Exam:  HENT:  pink conjunctiva anicteric sclerae  Neck no adenopathy, thyroid normal  Heart and Lungs:  normal  Extremities: no cyanosis, skin changes    NEURO  NEURO  Able to relate events with fluent speech and intact comprehension  CN 2-12: pupils reactive, VF full face symmetric sensation and movement tongue midline  No motor focal findings  Sensory: no lateralizing findings  Reflexes are symmetric  UMN signs: none  Gait: narrow based        INTERPRETATION of RELEVANT LABS and other DATA:          Problem/s Identified this visit:   1. Chronic intractable headache, unspecified headache type          Discussion plus Diagnostics & Treatment Orders:  Get PA for BOTOX and resume the treatment as soon as possible  Alternatively can do the CGRP injection      (x) Discussed potential side effects of any treatment relevant to above.  Includes explanation of tests as necessary.    Return in about 6 months (around 12/17/2024).  BUT get her in once BOTOX is approved to start injections    Patient understands that if needed, based on condition and or test results, follow up will be readjusted      Roman Morataya MD  Vascular & General Neurology  Director, Multiple Sclerosis Program  [facility]  6/17/2024, Time completed 10:41 AM    Decision making:  ( x ) labs reviewed/ordered - 1  (  ) new diagnosis: - 1  ( x) Images & studies independently reviewed -non F2F  (  ) Case/studies discussed with other caregivers - -non F2F  (  ) Telephone time with patiern or authorized Fam member--non F2F  ( x ) other records reviewed --non F2F including consultations  (  ) [facility] meetings - patient not present --non  F2F  (  ) Independent Historian obtained    Non Face to Face CPT code 67283/74282 applies as documented above    PROCEDURE DONE     (   ) see notes        After visit, patient was escorted out and handed-off discharge process and instructions to the check out desk.  No additional issues relevant to visit were raised to staff at this time interval.        This document is to be interpreted as my current opinion regarding the case as of the stated date of service based on the information available to me at this time and may supersedes any prior opinion expressed either orally or in writing.  Services rendered are only within the scope of direct medical care  Sometimes, reports may have been prepared partially using a speech recognition software technology.  If a word or phrase is confusing or out of context, please do not hesitate to call for clarification.

## 2024-06-18 ENCOUNTER — TELEPHONE (OUTPATIENT)
Dept: NEUROLOGY | Facility: CLINIC | Age: 54
End: 2024-06-18

## 2024-06-18 ENCOUNTER — LAB ENCOUNTER (OUTPATIENT)
Dept: LAB | Age: 54
End: 2024-06-18
Attending: FAMILY MEDICINE

## 2024-06-18 ENCOUNTER — HOSPITAL ENCOUNTER (OUTPATIENT)
Dept: ULTRASOUND IMAGING | Age: 54
Discharge: HOME | End: 2024-06-18
Attending: FAMILY MEDICINE

## 2024-06-18 DIAGNOSIS — Z86.711 PERSONAL HISTORY OF PE (PULMONARY EMBOLISM): ICD-10-CM

## 2024-06-18 DIAGNOSIS — Z95.810 AICD (AUTOMATIC CARDIOVERTER/DEFIBRILLATOR) PRESENT: Primary | ICD-10-CM

## 2024-06-18 DIAGNOSIS — I89.9 LYMPH NODE DISORDER: ICD-10-CM

## 2024-06-18 LAB
ANION GAP SERPL CALC-SCNC: 6 MMOL/L (ref 0–18)
BUN BLD-MCNC: 8 MG/DL (ref 9–23)
BUN/CREAT SERPL: 9.9 (ref 10–20)
CALCIUM BLD-MCNC: 9.2 MG/DL (ref 8.7–10.4)
CHLORIDE SERPL-SCNC: 110 MMOL/L (ref 98–112)
CO2 SERPL-SCNC: 29 MMOL/L (ref 21–32)
CREAT BLD-MCNC: 0.81 MG/DL
EGFRCR SERPLBLD CKD-EPI 2021: 86 ML/MIN/1.73M2 (ref 60–?)
FASTING STATUS PATIENT QL REPORTED: NO
GLUCOSE BLD-MCNC: 83 MG/DL (ref 70–99)
OSMOLALITY SERPL CALC.SUM OF ELEC: 297 MOSM/KG (ref 275–295)
POTASSIUM SERPL-SCNC: 3.8 MMOL/L (ref 3.5–5.1)
SODIUM SERPL-SCNC: 145 MMOL/L (ref 136–145)

## 2024-06-18 PROCEDURE — 80048 BASIC METABOLIC PNL TOTAL CA: CPT

## 2024-06-18 PROCEDURE — 76536 US EXAM OF HEAD AND NECK: CPT | Performed by: FAMILY MEDICINE

## 2024-06-18 PROCEDURE — 36415 COLL VENOUS BLD VENIPUNCTURE: CPT

## 2024-06-18 NOTE — TELEPHONE ENCOUNTER
PA requested for Reyvow  Clinical questions answered and submitted to insurance  Awaiting coverage determination

## 2024-06-18 NOTE — TELEPHONE ENCOUNTER
Called [facility] at [phone] spoke to Mere GIBSON       and [phone] require prior authorization.      Pending Authorization#[id number]    Faxed clinicals to:  [phone].      Confirmation received.

## 2024-06-25 NOTE — TELEPHONE ENCOUNTER
Called [phone] [facility] to check the status of the Botox Authorization.      Spoke with Shelia GOODWIN  Ref#[id number] this has been approved.  Approval#[id number] from 6/18/24-06/18/25- 4 visits.    This is buy and bill.      Once 200U of Botox is available please call patient to schedule.  Route back to the PA team

## 2024-06-25 NOTE — TELEPHONE ENCOUNTER
Received fax from [facility]   Approval received for patient use of Reyvow   Approval granted: 6/25/24-6/25/25        Pt notified

## 2024-06-27 NOTE — PAT NURSING NOTE
PAT call with patient. The following instructions were given and sent through her My Chart. Questions were answered and she verbalized understanding. She was unclear if she would be sedated. NPO instructions given.    PreOp Instructions    You are scheduled for: a Cardiac Procedure    Date of Procedure: 07/02/24    Diet Instructions: Do not eat or drink anything after midnight    Medications: Medications you are allowed to take can be taken with a sip of water the morning of your procedure, May take your pain medication if needed on the morning of the procedure    Medications to Stop: Hold herbal supplements and vitamins    Skin Prep: Shower with antibacterial soap using a clean washcloth, prior to procedure    Arrival Time: The day prior to your procedure you will receive a phone call before 6:00 pm with your arrival time. If you haven't received a phone call, please check your voicemail messages., If you did not receive a voice mail and it is after 6:00 pm, please call the nursing supervisor at [phone].    Driving After Procedure: If sedation is given, you WILL NOT be able to drive home. You will need a responsible adult  to drive you home.    Discharge Teaching: Your nurse will give you specific instructions before discharge, Any questions, please call the physician's office       parking is available starting at 6 am or park in the [place] parking garage at [facility]. Check in at the [facility] reception desk. Our  will be there to check you in for your procedure. Please bring your insurance cards and ID with you.                                                                                                                                      Please DO NOT respond to this message, the inbasket is not monitored for messages. For any questions, please call the physician's office.

## 2024-06-28 ENCOUNTER — OFFICE VISIT (OUTPATIENT)
Dept: NEUROLOGY | Facility: CLINIC | Age: 54
End: 2024-06-28

## 2024-06-28 VITALS
BODY MASS INDEX: 22.5 KG/M2 | HEART RATE: 65 BPM | SYSTOLIC BLOOD PRESSURE: 142 MMHG | HEIGHT: 66 IN | DIASTOLIC BLOOD PRESSURE: 65 MMHG | WEIGHT: 140 LBS | RESPIRATION RATE: 16 BRPM

## 2024-06-28 DIAGNOSIS — R51.9 CHRONIC INTRACTABLE HEADACHE, UNSPECIFIED HEADACHE TYPE: Primary | ICD-10-CM

## 2024-06-28 DIAGNOSIS — G89.29 CHRONIC INTRACTABLE HEADACHE, UNSPECIFIED HEADACHE TYPE: Primary | ICD-10-CM

## 2024-06-28 PROCEDURE — 64615 CHEMODENERV MUSC MIGRAINE: CPT | Performed by: OTHER

## 2024-06-28 RX ORDER — RIMEGEPANT SULFATE 75 MG/75MG
1 TABLET, ORALLY DISINTEGRATING ORAL EVERY OTHER DAY
COMMUNITY

## 2024-06-28 RX ORDER — LASMIDITAN 100 MG/1
100 TABLET ORAL ONCE AS NEEDED
Qty: 8 TABLET | Refills: 0 | Status: SHIPPED | OUTPATIENT
Start: 2024-06-28 | End: 2024-06-28

## 2024-06-28 NOTE — PATIENT INSTRUCTIONS
Refill policies:    Allow 2-3 business days for refills; controlled substances may take longer.  Contact your pharmacy at least 5 days prior to running out of medication and have them send an electronic request or submit request through the “request refill” option in your Gobble account.  Refills are not addressed on weekends; covering physicians do not authorize routine medications on weekends.  No narcotics or controlled substances are refilled after noon on Fridays or by on call physicians.  By law, narcotics must be electronically prescribed.  A 30 day supply with no refills is the maximum allowed.  If your prescription is due for a refill, you may be due for a follow up appointment.  To best provide you care, patients receiving routine medications need to be seen at least once a year.  Patients receiving narcotic/controlled substance medications need to be seen at least once every 3 months.  In the event that your preferred pharmacy does not have the requested medication in stock (e.g. Backordered), it is your responsibility to find another pharmacy that has the requested medication available.  We will gladly send a new prescription to that pharmacy at your request.    Scheduling Tests:    If your physician has ordered radiology tests such as MRI or CT scans, please contact Central Scheduling at [phone] right away to schedule the test.  Once scheduled, the [facility] Centralized Referral Team will work with your insurance carrier to obtain pre-certification or prior authorization.  Depending on your insurance carrier, approval may take 3-10 days.  It is highly recommended patients assure they have received an authorization before having a test performed.  If test is done without insurance authorization, patient may be responsible for the entire amount billed.      Precertification and Prior Authorizations:  If your physician has recommended that you have a procedure or additional testing performed the [facility]  Centralized Referral Team will contact your insurance carrier to obtain pre-certification or prior authorization.    You are strongly encouraged to contact your insurance carrier to verify that your procedure/test has been approved and is a COVERED benefit.  Although the [facility] Centralized Referral Team does its due diligence, the insurance carrier gives the disclaimer that \"Although the procedure is authorized, this does not guarantee payment.\"    Ultimately the patient is responsible for payment.   Thank you for your understanding in this matter.  Paperwork Completion:  If you require FMLA or disability paperwork for your recovery, please make sure to either drop it off or have it faxed to our office at [phone]. Be sure the form has your name and date of birth on it.  The form will be faxed to our Forms Department and they will complete it for you.  There is a 25$ fee for all forms that need to be filled out.  Please be aware there is a 10-14 day turnaround time.  You will need to sign a release of information (ORION) form if your paperwork does not come with one.  You may call the Forms Department with any questions at [phone].  Their fax number is [phone].

## 2024-06-28 NOTE — PROGRESS NOTES
Paperwork noting that patient may bear financial responsibility for procedure(s) performed in clinic today signed prior to proceeding with procedure(s).    Furthermore, patient notified that they should contact their insurer to verify that your procedure/test has been approved and is a COVERED benefit.  Although the [facility] staff does its due diligence, the insurance carrier gives the disclaimer that \"Although the procedure is authorized, this does not guarantee payment.\"    Ultimately the patient is responsible for payment.    Botox is:  [x] Buy and Bill  [] Patient Supplied      Botox Reauthorization Questions:  Has the patient experienced a reduction in frequency of migraines since starting Botox? yes  If yes, by what percentage? 50%  Has the intensity of migraines decreased since starting Botox? yes  If yes, by what percentage? 50%    [x] I have discussed with patient that if their insurance changes they must contact the office right away with that information so that a new prior authorization can be completed.  Patient verbalized understanding that Botox cannot be performed without a current prior authorization in place with correct insurance.

## 2024-06-28 NOTE — PROGRESS NOTES
NEUROLOGY  [facility]     6/28/2024  CHRONIC MIGRAINE - BOTOX Injection  CPT: 93683    Palak Berrios is a(n) 54 year old female with chronic migraine.  Patient is here for Botox injection.      ( x ) Headaches are frequent and based on screening procedures, satisfies criteria of chronic migraine:  >15 days a month, each occurrence can be > 4 hours duration.   Note is made that she has tried 2 or more prophylactic treatment in the past and has not responded that is why we are using Botox.      ( x ) Headaches have responded well to previous Botox injection.   Follow up injection necessary because headaches are recurring.  Patient is likewise familiar with the risks. These were reviewed and patient requests to proceed.    ROS:  No additional issues added after completing 10 point ROS      Current Outpatient Medications:     Rimegepant Sulfate (NURTEC) 75 MG Oral Tablet Dispersible, Take 1 tablet by mouth every other day., Disp: , Rfl:     Lasmiditan Succinate (REYVOW) 100 MG Oral Tab, Take 100 mg by mouth once as needed., Disp: 8 tablet, Rfl: 0    Multiple Vitamin (MULTIVITAMIN ADULT OR), Take 1 tablet by mouth daily., Disp: , Rfl:     Cobalamin Combinations (VITAMIN B12-FOLIC ACID OR), Take 1 tablet by mouth daily., Disp: , Rfl:     butalbital-acetaminophen-caffeine -40 MG Oral Tab, TAKE 1 TO 2 TABLETS BY MOUTH EVERY 4 TO 6 HOURS AS NEEDED FOR HEADACHE, Disp: 60 tablet, Rfl: 5    zonisamide 100 MG Oral Cap, Take 1 capsule (100 mg total) by mouth daily., Disp: 90 capsule, Rfl: 3    atorvastatin 40 MG Oral Tab, Take 1 tablet (40 mg total) by mouth nightly., Disp: 90 tablet, Rfl: 3    XARELTO 20 MG Oral Tab, TAKE 1 TABLET BY MOUTH DAILY, Disp: 90 tablet, Rfl: 3    amLODIPine Besylate (NORVASC) 5 MG Oral Tab, Take 1 tablet (5 mg total) by mouth daily., Disp: 90 tablet, Rfl: 3      /65 (BP Location: Left arm, Patient Position: Sitting, Cuff Size: adult)   Pulse 65   Resp 16   Ht 66\"    Wt 140 lb (63.5 kg)   BMI 22.60 kg/m²   HENT:  Pink conjunctiva, anicteric sclerae, moist mucosa  Neck: No adenopathy or thyromegaly  Heart S1S2, no murmur  Lungs are clear, no wheezing  Extremities: no cyanosis or edema      PROCEDURE:  Assisted by: CMA or RN in room  BOTOX injection for chronic migraine:  Using alcohol prep, fixed site protocol performed.  Botox was reconstituted to the following concentration:  5 units per 0.1 ml.      Muscles, number of sites, units used and Side injected were as follows:                                                 Units used     Side  Procerus   5 units        center  Corrugators 2 sites  10 units      R/L  Frontalis   4 sites  20 units      R/L  Temporalis   4 sites each side  40 units      R/L  Occipitalis:            3 sites each side             30 units      R/L        Cervical paraspinals   2 sites each side 20 units      R/L  Upper trapezius   3 sites each side 30 units      R/L                                  TOTAL       155 units  Discarded:  45 units          IMPRESSION:  1. Chronic intractable headache, unspecified headache type        Post injections instructions:  Expect response to start on the second or going into the 3rd week  Use ice packs and Tylenol ES if with pain at injections sites  Report any signs of infection or inflammation at site of injection  Use migraine medications the same was as before    After procedure check out process by PSRs and rooming RN/MA who were present during the procedure to assist.        Roman Morataya MD  Vascular & General Neurology  [facility]

## 2024-07-02 ENCOUNTER — HOSPITAL ENCOUNTER (OUTPATIENT)
Dept: INTERVENTIONAL RADIOLOGY/VASCULAR | Facility: HOSPITAL | Age: 54
Discharge: HOME | End: 2024-07-02
Attending: INTERNAL MEDICINE | Admitting: INTERNAL MEDICINE
Payer: COMMERCIAL

## 2024-07-02 VITALS
OXYGEN SATURATION: 100 % | BODY MASS INDEX: 22.23 KG/M2 | TEMPERATURE: 98 F | WEIGHT: 140 LBS | DIASTOLIC BLOOD PRESSURE: 88 MMHG | RESPIRATION RATE: 12 BRPM | HEIGHT: 66.5 IN | SYSTOLIC BLOOD PRESSURE: 128 MMHG | HEART RATE: 57 BPM

## 2024-07-02 DIAGNOSIS — T82.190A: ICD-10-CM

## 2024-07-02 PROCEDURE — 36005 INJECTION EXT VENOGRAPHY: CPT | Performed by: INTERNAL MEDICINE

## 2024-07-02 PROCEDURE — B517YZZ FLUOROSCOPY OF LEFT SUBCLAVIAN VEIN USING OTHER CONTRAST: ICD-10-PCS | Performed by: INTERNAL MEDICINE

## 2024-07-02 PROCEDURE — 75820 VEIN X-RAY ARM/LEG: CPT | Performed by: INTERNAL MEDICINE

## 2024-07-02 PROCEDURE — B51NYZZ FLUOROSCOPY OF LEFT UPPER EXTREMITY VEINS USING OTHER CONTRAST: ICD-10-PCS | Performed by: INTERNAL MEDICINE

## 2024-07-02 RX ORDER — IODIXANOL 320 MG/ML
100 INJECTION, SOLUTION INTRAVASCULAR
Status: COMPLETED | OUTPATIENT
Start: 2024-07-02 | End: 2024-07-02

## 2024-07-02 RX ADMIN — IODIXANOL 10 ML: 320 INJECTION, SOLUTION INTRAVASCULAR at 12:03:00

## 2024-07-02 NOTE — IVS NOTE
Patient discharged home post venogram. PIV removed. Dr. Munguia spoke to patient post imaging. Discharged with all belongings. To the [facility].

## 2024-07-03 NOTE — H&P
H&P    Palak Berrios Patient Status:  Outpatient    1970 MRN [id number]   Location [facility] INTERVENTIONAL SUITES Attending No att. providers found   Hosp Day # 0 PCP Carmen Carrillo DO     Reason for Admission:  venogram    Assessment/Plan:  Patient Active Problem List   Diagnosis    Chondromalacia of patella    Chronic headache    Ventricular fibrillation (HCC)    Migraines    Other pulmonary embolism and infarction    Vasospasm (HCC)    Vasculitis (HCC)    Cardiomyopathy (HCC)    Thyromegaly    Secondary hypercoagulability disorder (HCC)    Spastic colon    Tricuspid valve disorder    Edema    Hyperlipidemia    Automatic implantable cardioverter-defibrillator in situ    Dissection of coronary artery    Chest pain    MTHFR mutation    Carotid artery dissection (HCC)    Neck pain    Chronic intractable headache    Intractable episodic paroxysmal hemicrania    History of carotid artery dissection    Hoarse voice quality    Iliac artery dissection (HCC)    Fibromuscular dysplasia (HCC)    Adrenal adenoma       Pt presents for venogram    History of Present Illness:  Palak Berrios is a a(n) 54 year old female presents for venogram.    History:  Past Medical History:    Abdominal pain    AICD (automatic cardioverter/defibrillator) present    Automatic implantable cardiac defibrillator in situ    Blood clot in vein    Cardiac arrest (HCC)    Cardiac defibrillator in place    Cardiomyopathy (HCC)    Resolved.     Chronic headaches    Dissection of coronary artery    DVT (deep venous thrombosis) (HCC)    Edema    H/O arterial dissection    LAD    Hemorrhoids    Hoarseness, chronic    Hyperlipidemia    Migraines    Other pulmonary embolism and infarction    Postpartum, Aug 2005.     PONV (postoperative nausea and vomiting)    Pulmonary embolism (HCC)    Secondary hypercoagulability disorder (HCC)    Protein S deficiency, W/heterzygous MTHFR mutation.     Spastic colon    Stented coronary artery     Thyromegaly    Tricuspid valve disorder    Vasculitis (HCC)    Involving cerebral vascular bed & R carotid.    Vasospasm (HCC)    During catheter introduction in R coronary artery, Jan 2007.     Ventricular fibrillation (HCC)    Arrest, 2 weeks post delivery of 4th child, Aug 2005.     Wears glasses     Past Surgical History:   Procedure Laterality Date    Angioplasty (coronary)  2005     2.25x x 18mm Vision x 2 stents in LADm     Angioplasty (coronary)  2007    2.5 x 8 mm Vision in LADp    Cardiac pacemaker placement      Colonoscopy      Egd      Hysterectomy  07/2021    Other surgical history  8/2005, 1/2009    stents, ICD, mini vision     Other surgical history  8/2005    ICD implantation     Family History   Problem Relation Age of Onset    Diabetes Other         grandmother     Cancer Other         grandmother     Stroke Other         grandmother     Heart Disease Other         grandmother     Hypertension Father     Hypertension Mother     Colon Polyps Mother     Other (VSD & ASD) Other         child born with, repaired at birth     Colon Cancer Maternal Grandmother     Diabetes Maternal Grandmother     Hypertension Maternal Grandmother     Heart Attack Maternal Grandmother     Stroke Maternal Grandmother       reports that she has never smoked. She has never been exposed to tobacco smoke. She has never used smokeless tobacco. She reports current alcohol use. She reports that she does not use drugs.    Allergies:  Allergies   Allergen Reactions    Clavulanic Acid HIVES    Augmentin [Amoxicillin-Pot Clavulanate] HIVES    Cephalexin OTHER (SEE COMMENTS)     Oral, hives    Keflex [Cephalexin Monohydrate] HIVES       Medications:  No current facility-administered medications for this encounter.    Review of Systems:  All systems were reviewed and are negative except as described above in HPI.    Physical Exam:  Blood pressure 128/88, pulse 57, temperature 98.1 °F (36.7 °C), resp. rate 12, height 66.5\", weight 140  lb (63.5 kg), SpO2 100%, not currently breastfeeding.  Temp (24hrs), Av.1 °F (36.7 °C), Min:98.1 °F (36.7 °C), Max:98.1 °F (36.7 °C)    Wt Readings from Last 3 Encounters:   24 140 lb (63.5 kg)   24 140 lb (63.5 kg)   24 140 lb (63.5 kg)       Telemetry: SR  General: Alert and oriented in no apparent distress.  HEENT: No focal deficits.  Neck: No JVD, carotids 2+ no bruits.  Cardiac: Regular rate and rhythm, S1, S2 normal, no murmur, rub or gallop.  Lungs: Clear without wheezes, rales, rhonchi or dullness.  Normal excursions and effort.  Abdomen: Soft, non-tender.   Extremities: Without clubbing, cyanosis or edema.  Peripheral pulses are 2+.  Neurologic: Alert and oriented, normal affect.  Skin: Warm and dry.     Laboratories and Data:  Diagnostics:    Labs:        Lab Results   Component Value Date    PT 19.4 (H) 2013    PT 40.8 (H) 2011    PT 39.0 (H) 2011    INR 1.47 (H) 2019    INR 1.33 (H) 06/15/2017    INR 1.54 (H) 2015         Kary Munguia MD  7/3/2024  4:55 AM    Kary Munguia MD  [facility]  Cardiac Electrophysiolgy  [phone]

## 2024-07-03 NOTE — PROCEDURES
Procedure: Venogram    : Kary Munguia MD    Indication: rule out venous occlusion.     Sedation: none    Methods: The patient was brought to the EP lab after providing informed consent. IV contrast was injected in a peripheral IV in the patients left arm while recording the image on fluoroscopy. The images demonstrated the subclavian and axillary veins to be occluded.     Conclusions:  1. Axillary and subclavian veins were visualized to be occluded with extensive collaterals.     Kary Munguia MD  [facility]  Cardiac Electrophysiolgy  [phone]

## 2024-07-15 ENCOUNTER — HOSPITAL ENCOUNTER (OUTPATIENT)
Dept: CT IMAGING | Facility: HOSPITAL | Age: 54
Discharge: HOME | End: 2024-07-15
Attending: INTERNAL MEDICINE
Payer: COMMERCIAL

## 2024-07-15 VITALS
BODY MASS INDEX: 21.97 KG/M2 | SYSTOLIC BLOOD PRESSURE: 147 MMHG | WEIGHT: 140 LBS | HEART RATE: 56 BPM | HEIGHT: 67 IN | DIASTOLIC BLOOD PRESSURE: 80 MMHG | RESPIRATION RATE: 16 BRPM

## 2024-07-15 DIAGNOSIS — I77.71 CAROTID ARTERY DISSECTION (HCC): ICD-10-CM

## 2024-07-15 PROCEDURE — 75574 CT ANGIO HRT W/3D IMAGE: CPT | Performed by: INTERNAL MEDICINE

## 2024-07-15 RX ORDER — METOPROLOL TARTRATE 1 MG/ML
5 INJECTION, SOLUTION INTRAVENOUS SEE ADMIN INSTRUCTIONS
Status: DISCONTINUED | OUTPATIENT
Start: 2024-07-15 | End: 2024-07-17

## 2024-07-15 RX ORDER — NITROGLYCERIN 0.4 MG/1
0.4 TABLET SUBLINGUAL ONCE
Status: COMPLETED | OUTPATIENT
Start: 2024-07-15 | End: 2024-07-15

## 2024-07-15 RX ORDER — DILTIAZEM HYDROCHLORIDE 5 MG/ML
5 INJECTION INTRAVENOUS SEE ADMIN INSTRUCTIONS
Status: DISCONTINUED | OUTPATIENT
Start: 2024-07-15 | End: 2024-07-17

## 2024-07-15 RX ADMIN — NITROGLYCERIN 0.4 MG: 0.4 TABLET SUBLINGUAL at 08:42:00

## 2024-07-15 NOTE — DISCHARGE INSTRUCTIONS
Computed Tomography Angiography (CTA)  Computed tomography angiography (CTA) is an imaging test. It uses X-rays and computer technology to make detailed pictures of your arteries (blood vessels). Before the test, an X-ray dye (contrast medium) is injected into your vein. The dye makes it easier to see your blood vessels on the X-ray. Pictures are then taken with the CT scanner. A computer turns the images into 2-D and 3-D pictures.      CTA can make 3D images, such as the carotid arteries shown here.     Why CTA is done  CTA may be used to:  Check arteries in your belly, neck, lungs, pelvis, kidneys, or brain.  Look for a ballooning of the blood vessel wall (aneurysm) or a tear (dissection).  Check if a tube (stent) used to keep an artery open is working well.  Find damage to your arteries due to injuries.  Collect details on blood vessels that supply blood to tumors.  Getting ready for your test  Tell your healthcare provider if you:   Have diabetes  Have kidney disease  Are allergic to X-ray dye or other medicines  Are pregnant, think you may be pregnant, or are breastfeeding  Are taking any medicines, herbs, or supplements, including prescription medicines, illegal drugs, and over-the-counter medicines such as aspirin or ibuprofen  Follow any directions you are given for not eating or drinking before the CTA. Follow any other instructions from your healthcare provider.   During your test  You will be asked to remove any hair clips, jewelry, false teeth, or other metal items that could show up on the X-ray.  You will lie down on the scanning table. An IV line will be put in a vein in your arm or hand.  The scanning table will be properly placed. The part of your body being checked will be inside the doughnut-shaped CT scanner.  One image may be taken first to be sure you are in the proper position for the test.  The IV will be hooked up to an automatic injection machine. This controls how often and how fast the  X-ray dye is injected. The injection may continue during part of the exam.  The dye will be put into your vein through the IV line. You may feel warmth through your body when the dye is injected.  You can’t move while the X-rays are being taken. Pillows and foam pads may be used to help you stay still. You will be told to hold your breath for 10 to 25 seconds at a time.  A single scan may take several minutes. You may need more than one scan.    After your test  Drink plenty of fluids to help flush the X-ray dye from your body.  You may eat as soon as you want to.    Possible risks  All procedures have some risks. A CTA has some possible risks. These include:   Problems due to the X-ray dye, such as an allergic reaction or kidney damage  Skin damage from leaking X-ray dye near where the IV was put in  [id number] last reviewed this educational content on 8/1/2022  © 7116-4970 [employer]. All rights reserved. This information is not intended as a substitute for professional medical care. Always follow your healthcare professional's instructions.

## 2024-07-15 NOTE — IMAGING NOTE
TO RAD HOLDING AT 0740       HX TAKEN: PT HERE FOR CTA, HAS A [employer] PACER AND ICD, PER PT HER LEADS ARE NOT WORKING , SO ICD IS TURNED OFF AND PACER OFF PER PT.  BASELINE OWN HR IS IN THE 50'S    PT CONSENTED AT 0811      BASELINE VITAL SIGNS: HR 56  /80 BMI 21.9    CTA ORDERED BY DR OH WAS PT GIVEN CTA PREMEDS NO    18 GAUGE IV STARTED AT 0830  POC TESTING COMPLETED GFR = 86   CREATINE = 0.81    TO CT TABLE @ 0835    CONNECT TO MONITOR  HR 50 /82      NITROGLYCERIN 0.4 MILLIGRAMS SUBLINGUAL GIVEN AT 0842      CALCIUM SCORE COMPLETED AT N/A DUE TO 3 STENTS      INJECTION STARTED AT 0846 HR 59  DURING SCAN PROCEDURE COMPLETE    POST SCAN HR 58 /74  AT 0858     PT TO HOLDING AREA     0805  /68   HR 58     AVS  PROVIDED      0915  DISCHARGED HOME

## 2024-08-06 ENCOUNTER — APPOINTMENT (OUTPATIENT)
Dept: ULTRASOUND IMAGING | Facility: HOSPITAL | Age: 54
End: 2024-08-06
Attending: EMERGENCY MEDICINE
Payer: COMMERCIAL

## 2024-08-06 ENCOUNTER — HOSPITAL ENCOUNTER (EMERGENCY)
Facility: HOSPITAL | Age: 54
Discharge: HOME | End: 2024-08-06
Attending: EMERGENCY MEDICINE
Payer: COMMERCIAL

## 2024-08-06 ENCOUNTER — APPOINTMENT (OUTPATIENT)
Dept: CT IMAGING | Facility: HOSPITAL | Age: 54
End: 2024-08-06
Attending: EMERGENCY MEDICINE
Payer: COMMERCIAL

## 2024-08-06 ENCOUNTER — APPOINTMENT (OUTPATIENT)
Dept: GENERAL RADIOLOGY | Facility: HOSPITAL | Age: 54
End: 2024-08-06
Attending: EMERGENCY MEDICINE
Payer: COMMERCIAL

## 2024-08-06 ENCOUNTER — APPOINTMENT (OUTPATIENT)
Dept: GENERAL RADIOLOGY | Facility: HOSPITAL | Age: 54
End: 2024-08-06
Payer: COMMERCIAL

## 2024-08-06 VITALS
BODY MASS INDEX: 21.97 KG/M2 | SYSTOLIC BLOOD PRESSURE: 149 MMHG | WEIGHT: 140 LBS | HEART RATE: 56 BPM | HEIGHT: 67 IN | TEMPERATURE: 98 F | RESPIRATION RATE: 17 BRPM | OXYGEN SATURATION: 100 % | DIASTOLIC BLOOD PRESSURE: 86 MMHG

## 2024-08-06 DIAGNOSIS — R07.89 CHEST PAIN, ATYPICAL: Primary | ICD-10-CM

## 2024-08-06 DIAGNOSIS — R51.9 ACUTE NONINTRACTABLE HEADACHE, UNSPECIFIED HEADACHE TYPE: ICD-10-CM

## 2024-08-06 LAB
ALBUMIN SERPL-MCNC: 4.4 G/DL (ref 3.2–4.8)
ALBUMIN/GLOB SERPL: 1.6 {RATIO} (ref 1–2)
ALP LIVER SERPL-CCNC: 83 U/L
ALT SERPL-CCNC: 13 U/L
ANION GAP SERPL CALC-SCNC: 6 MMOL/L (ref 0–18)
AST SERPL-CCNC: 27 U/L (ref ?–34)
ATRIAL RATE: 57 BPM
BASOPHILS # BLD AUTO: 0.04 X10(3) UL (ref 0–0.2)
BASOPHILS NFR BLD AUTO: 0.8 %
BILIRUB SERPL-MCNC: 0.4 MG/DL (ref 0.3–1.2)
BUN BLD-MCNC: <5 MG/DL (ref 9–23)
CALCIUM BLD-MCNC: 9.8 MG/DL (ref 8.7–10.4)
CHLORIDE SERPL-SCNC: 110 MMOL/L (ref 98–112)
CO2 SERPL-SCNC: 25 MMOL/L (ref 21–32)
CREAT BLD-MCNC: 0.85 MG/DL
EGFRCR SERPLBLD CKD-EPI 2021: 81 ML/MIN/1.73M2 (ref 60–?)
EOSINOPHIL # BLD AUTO: 0.12 X10(3) UL (ref 0–0.7)
EOSINOPHIL NFR BLD AUTO: 2.5 %
ERYTHROCYTE [DISTWIDTH] IN BLOOD BY AUTOMATED COUNT: 12.5 %
GLOBULIN PLAS-MCNC: 2.8 G/DL (ref 2–3.5)
GLUCOSE BLD-MCNC: 92 MG/DL (ref 70–99)
HCT VFR BLD AUTO: 41.6 %
HGB BLD-MCNC: 14.2 G/DL
IMM GRANULOCYTES # BLD AUTO: 0.01 X10(3) UL (ref 0–1)
IMM GRANULOCYTES NFR BLD: 0.2 %
LYMPHOCYTES # BLD AUTO: 0.94 X10(3) UL (ref 1–4)
LYMPHOCYTES NFR BLD AUTO: 19.8 %
MCH RBC QN AUTO: 29.9 PG (ref 26–34)
MCHC RBC AUTO-ENTMCNC: 34.1 G/DL (ref 31–37)
MCV RBC AUTO: 87.6 FL
MONOCYTES # BLD AUTO: 0.38 X10(3) UL (ref 0.1–1)
MONOCYTES NFR BLD AUTO: 8 %
NEUTROPHILS # BLD AUTO: 3.26 X10 (3) UL (ref 1.5–7.7)
NEUTROPHILS # BLD AUTO: 3.26 X10(3) UL (ref 1.5–7.7)
NEUTROPHILS NFR BLD AUTO: 68.7 %
P AXIS: 55 DEGREES
P-R INTERVAL: 154 MS
PLATELET # BLD AUTO: 191 10(3)UL (ref 150–450)
POTASSIUM SERPL-SCNC: 3.7 MMOL/L (ref 3.5–5.1)
PROT SERPL-MCNC: 7.2 G/DL (ref 5.7–8.2)
Q-T INTERVAL: 436 MS
QRS DURATION: 72 MS
QTC CALCULATION (BEZET): 424 MS
R AXIS: 68 DEGREES
RBC # BLD AUTO: 4.75 X10(6)UL
SODIUM SERPL-SCNC: 141 MMOL/L (ref 136–145)
T AXIS: 49 DEGREES
TROPONIN I SERPL HS-MCNC: 4 NG/L
VENTRICULAR RATE: 57 BPM
WBC # BLD AUTO: 4.8 X10(3) UL (ref 4–11)

## 2024-08-06 PROCEDURE — 85025 COMPLETE CBC W/AUTO DIFF WBC: CPT | Performed by: EMERGENCY MEDICINE

## 2024-08-06 PROCEDURE — 93971 EXTREMITY STUDY: CPT | Performed by: EMERGENCY MEDICINE

## 2024-08-06 PROCEDURE — 93010 ELECTROCARDIOGRAM REPORT: CPT

## 2024-08-06 PROCEDURE — 80053 COMPREHEN METABOLIC PANEL: CPT | Performed by: EMERGENCY MEDICINE

## 2024-08-06 PROCEDURE — 96374 THER/PROPH/DIAG INJ IV PUSH: CPT

## 2024-08-06 PROCEDURE — 96361 HYDRATE IV INFUSION ADD-ON: CPT

## 2024-08-06 PROCEDURE — 71275 CT ANGIOGRAPHY CHEST: CPT | Performed by: EMERGENCY MEDICINE

## 2024-08-06 PROCEDURE — 96375 TX/PRO/DX INJ NEW DRUG ADDON: CPT

## 2024-08-06 PROCEDURE — 99285 EMERGENCY DEPT VISIT HI MDM: CPT

## 2024-08-06 PROCEDURE — 84484 ASSAY OF TROPONIN QUANT: CPT | Performed by: EMERGENCY MEDICINE

## 2024-08-06 PROCEDURE — 85025 COMPLETE CBC W/AUTO DIFF WBC: CPT

## 2024-08-06 PROCEDURE — 84484 ASSAY OF TROPONIN QUANT: CPT

## 2024-08-06 PROCEDURE — 80053 COMPREHEN METABOLIC PANEL: CPT

## 2024-08-06 PROCEDURE — 93005 ELECTROCARDIOGRAM TRACING: CPT

## 2024-08-06 RX ORDER — DIPHENHYDRAMINE HYDROCHLORIDE 50 MG/ML
50 INJECTION INTRAMUSCULAR; INTRAVENOUS ONCE
Status: COMPLETED | OUTPATIENT
Start: 2024-08-06 | End: 2024-08-06

## 2024-08-06 RX ORDER — METOCLOPRAMIDE HYDROCHLORIDE 5 MG/ML
10 INJECTION INTRAMUSCULAR; INTRAVENOUS ONCE
Status: COMPLETED | OUTPATIENT
Start: 2024-08-06 | End: 2024-08-06

## 2024-08-06 NOTE — ED PROVIDER NOTES
Patient Seen in: [facility] Emergency Department      History     Chief Complaint   Patient presents with    Chest Pressure     Stated Complaint: chest tightness    Subjective:   HPI  54-year-old female comes to the emergency department for evaluation of intermittent left-sided chest pain radiating to the neck over the past 24 hours.  She states that the pain feels like a squeezing sensation that lasts for seconds, remits and then reoccurs again a number of minutes or longer later.  It starts in the left upper chest and radiates into the left side of the neck.  It is occasionally worse with deep inspiration.  She denies shortness of breath, fever, cough or colored sputum production.  It awakened her multiple times during the night from sleep.  She has not noticed any increase with exertion.  She did feel fatigued yesterday.    Her medical history is complicated.  She has had multiple pulmonary emboli including on warfarin and so over the past couple of years she has been on a DOAC.  She has been compliant with her medication.  She has had a few days worth of right leg pain without swelling prior to the onset of this left-sided chest pain.  She also has a history of carotid artery and LAD dissections a number of years ago which caused a cardiopulmonary arrest and the patient has a defibrillator however one of the wires is broken and requires replacement.  Patient currently is wearing a LifeVest.    Objective:   Past Medical History:    Abdominal pain    AICD (automatic cardioverter/defibrillator) present    Automatic implantable cardiac defibrillator in situ    Blood clot in vein    Cardiac arrest (HCC)    Cardiac defibrillator in place    Cardiomyopathy (HCC)    Resolved.     Chronic headaches    Dissection of coronary artery    DVT (deep venous thrombosis) ([facility])    Edema    H/O arterial dissection    LAD    Hemorrhoids    Hoarseness, chronic    Hyperlipidemia    Migraines    Other pulmonary embolism and  infarction    Postpartum, Aug 2005.     PONV (postoperative nausea and vomiting)    Pulmonary embolism (HCC)    Secondary hypercoagulability disorder (HCC)    Protein S deficiency, W/heterzygous MTHFR mutation.     Spastic colon    Stented coronary artery    Thyromegaly    Tricuspid valve disorder    Vasculitis (HCC)    Involving cerebral vascular bed & R carotid.    Vasospasm (HCC)    During catheter introduction in R coronary artery, Jan 2007.     Ventricular fibrillation (HCC)    Arrest, 2 weeks post delivery of 4th child, Aug 2005.     Wears glasses              Past Surgical History:   Procedure Laterality Date    Angioplasty (coronary)  2005     2.25x x 18mm Vision x 2 stents in LADm     Angioplasty (coronary)  2007    2.5 x 8 mm Vision in LADp    Cardiac pacemaker placement      Colonoscopy      Egd      Hysterectomy  07/2021    Other surgical history  8/2005, 1/2009    stents, ICD, mini vision     Other surgical history  8/2005    ICD implantation                Social History     Socioeconomic History    Marital status:     Number of children: 4   Occupational History    Occupation: Dev Director     Employer: [employer]    Occupation: stay at home mom   Tobacco Use    Smoking status: Never     Passive exposure: Never    Smokeless tobacco: Never   Vaping Use    Vaping status: Never Used   Substance and Sexual Activity    Alcohol use: Yes     Alcohol/week: 0.0 standard drinks of alcohol     Comment: socially    Drug use: No   Other Topics Concern    Caffeine Concern Yes     Comment: 1 cup of coffee daily    Exercise Yes     Comment: 5 x a week, walking              Review of Systems    Positive for stated Chief Complaint: Chest Pressure    Other systems are as noted in HPI.  Constitutional and vital signs reviewed.      All other systems reviewed and negative except as noted above.    Physical Exam     ED Triage Vitals [08/06/24 1630]   /88   Pulse 61   Resp 20   Temp 98 °F (36.7 °C)    Temp src Oral   SpO2 99 %   O2 Device None (Room air)       Current Vitals:   Vital Signs  BP: 149/86  Pulse: 56  Resp: 17  Temp: 98 °F (36.7 °C)  Temp src: Oral  MAP (mmHg): (!) 105    Oxygen Therapy  SpO2: 100 %  O2 Device: None (Room air)            Physical Exam    General Appearance: This is a middle-aged female lying on a gurney.  Vital signs were reviewed per nurses notes.  Monitor reveals a sinus rhythm without ectopy.:  Normocephalic/atraumatic.  Anicteric sclera.  Oral mucosa is moist.  Oropharynx is normal.  Neck: No adenopathy or thyromegaly.  Lungs are clear to auscultation.  Heart exam: Normal S1-S2 without extra sounds or murmurs.  Regular rate and rhythm.  Chest wall is nontender.  Abdomen is soft and nontender without masses or rebound.  Extremities are atraumatic.  Skin is dry without rashes or lesions.  Neuroexam: Awake, conversive and moving all 4 extremities well.    ED Course     Labs Reviewed   CBC WITH DIFFERENTIAL WITH PLATELET - Abnormal; Notable for the following components:       Result Value    Lymphocyte Absolute 0.94 (*)     All other components within normal limits   COMP METABOLIC PANEL (14) - Abnormal; Notable for the following components:    BUN <5 (*)     All other components within normal limits   TROPONIN I HIGH SENSITIVITY - Normal   RAINBOW DRAW LAVENDER   RAINBOW DRAW LIGHT GREEN   RAINBOW DRAW BLUE     EKG    Rate, intervals and axes as noted on EKG Report.  Rate: 57  Rhythm: Sinus bradycardia  Reading: Otherwise normal EKG.                 Intravenous access was obtained.  Laboratory studies were drawn.    US VENOUS DOPPLER LEG RIGHT - DIAG IMG (CPT=93971)    Result Date: 8/6/2024  PROCEDURE:  US VENOUS DOPPLER LEG RIGHT - DIAG IMG (CPT=93971)  COMPARISON:  [facility], US VENOUS DOPPLER LEG BILAT - DIAG IMG (CPT=93970), 5/24/2021, 3:44 PM.  INDICATIONS:  Right leg/calf pain.  TECHNIQUE:  Real time, grey scale, and duplex ultrasound was used to evaluate the lower  extremity venous system. B-mode two-dimensional images of the vascular structures, Doppler spectral analysis, and color flow.  Doppler imaging were performed.  The following veins were imaged:  Common, deep, and superficial femoral, popliteal, sapheno-femoral junction, posterior tibial veins, and the contralateral common femoral vein.  PATIENT STATED HISTORY: (As transcribed by Technologist)     FINDINGS:  EXTREMITY EXAMINED:  Right lower extremity. SAPHENOFEMORAL JUNCTION:  No reflux. THROMBI:  None visible. COMPRESSION:  Normal compressibility, phasicity, and augmentation. OTHER:  Negative.            CONCLUSION:  Negative exam, no evidence of right lower extremity DVT.   LOCATION:  [facility]    Dictated by (CST): Siddharth Bruno DO on 8/06/2024 at 9:36 PM     Finalized by (CST): Siddharth Bruno DO on 8/06/2024 at 9:41 PM       CTA CHEST (CPT=71275)    Result Date: 8/6/2024  PROCEDURE:  CT ANGIOGRAPHY, CHEST (CPT=71275)  COMPARISON:  [facility] , CT, CT ANGIOGRAPHY, CHEST (CPT=71275), 2/28/2019, 9:49 PM.  INDICATIONS:  chest tightness  TECHNIQUE:  IV contrast-enhanced multislice CT angiography is performed through the pulmonary arterial anatomy. 3D volume renderings are generated.  Dose reduction techniques were used. Dose information is transmitted to the ACR (American College of Radiology) NRDR (National Radiology Data Registry) which includes the Dose Index Registry.  PATIENT STATED HISTORY:(As transcribed by Technologist)  Patient with chest pain and shortness of breath.   CONTRAST USED:  100cc of Isovue 370  FINDINGS:  VASCULATURE:  No visible pulmonary arterial thrombus or attenuation.  THORACIC AORTA:  No aneurysm or visible dissection.  LUNGS:  There is bilateral peribronchial thickening most pronounced involving the left upper lobe and lingula, series 5 image is 1 31 through 137.  There is a left upper lobe noncalcified nodule measuring 4 mm, image 121 and left upper lobe nodule measuring 4.5 mm, image 106.   Scattered bilateral subsegmental atelectasis.  MEDIASTINUM:  There is enlarged mediastinal and hilar adenopathy.  Right paratracheal lymph node measures 1.6 x 1.0 cm.  AP window adenopathy with dominant nodes measuring 1.7 x 1.2 cm and 2.1 x 1.1 cm.  Right hilar node measuring 1.5 x 1.4 cm and subcarinal adenopathy measuring 2.8 x 1.2 cm.  CARDIAC:  No enlargement, pericardial thickening, or significant coronary artery calcification. PLEURA:  No pneumothorax or effusion.  CHEST WALL:  No enlarged axillary adenopathy.  LIMITED ABDOMEN:  Limited images of the upper abdomen are unremarkable.  BONES:  No lytic or destructive process.  Mild endplate hypertrophic changes.            CONCLUSION:  No evidence of acute pulmonary embolism.  Mediastinal and right hilar adenopathy.  There is peribronchial thickening most severe involving the left upper lobe.  Noncalcified nodules in the left upper lobe largest measuring 4.5 mm.  These are new when compared to prior CT from 2/28/2019.  The findings include multiple, incidentally detected, solid pulmonary nodules, measuring less than 6mm.  2017 guidelines from the Fleischner Society for the follow-up and management of incidentally detected indeterminate pulmonary nodules in persons at least 35 years of age depend on nodule size (average length and width) and underlying risk factors (including smoking and other  risk factors). Please consider the following recommendations after clinical assessment of risk factors.  For <6mm solid nodules: In low risk patients, no follow-up required.  If suspicious morphology or upper lobe location, consider 12 month follow-up.  In high risk patients, optional CT in 12 months.   .    LOCATION:  [id number]   Dictated by (CST): Zehra Reid MD on 8/06/2024 at 8:06 PM     Finalized by (CST): Zehra Reid MD on 8/06/2024 at 8:13 PM       I personally reviewed the images myself and went over results with patient.    I viewed the CTA of the chest and the  venous Doppler of the right lower extremity films myself.  No evidence of acute pulmonary embolism or DVT.  Incidental pulmonary nodules noted.    The patient was treated with Reglan and Benadryl for headache which she has had for a few days without symptom improvement.    Test results and treatment plan were discussed.  Patient deferred any additional medication for headache.  She was reassured regarding chest pain and normal studies given the patient's complicated cardiopulmonary history previously.       MDM      #1.  Atypical chest pain.  Very brief episodes of pain lasting seconds.  No evidence of pulmonary embolism, pneumothorax or pneumonia.  Low likelihood for coronary ischemia based on brief episodes.  Patient was comfortable going home.  Cardiology follow-up recommended.  2.  Acute non-intractable headache.  Neurologically intact.  History of headaches similar to this and has been treated for migraines in the remote past.                                   Medical Decision Making      Disposition and Plan     Clinical Impression:  1. Chest pain, atypical    2. Acute nonintractable headache, unspecified headache type         Disposition:  Discharge  8/6/2024 10:28 pm    Follow-up:  Kary Munguia MD  [address] [address]  [phone]    Call in 1 day(s)            Medications Prescribed:  Discharge Medication List as of 8/6/2024 10:42 PM

## 2024-08-06 NOTE — ED INITIAL ASSESSMENT (HPI)
Patient presents with chest pressure and squeezing feeling that started yesterday and currently has pain to left shoulder to neck that's getting worse. Has pacemaker wire that failed. Currently wearing a zoll life-vest. Extensive cardiac history. Reports she did hit her head about ten days ago. On thinners.

## 2024-08-07 ENCOUNTER — TELEPHONE (OUTPATIENT)
Dept: FAMILY MEDICINE CLINIC | Facility: CLINIC | Age: 54
End: 2024-08-07

## 2024-08-07 NOTE — DISCHARGE INSTRUCTIONS
Follow-up CAT scan in 12 months to evaluate pulmonary nodules.    Return to the emergency department for new or worsening symptoms.

## 2024-08-08 ENCOUNTER — OFFICE VISIT (OUTPATIENT)
Dept: FAMILY MEDICINE CLINIC | Facility: CLINIC | Age: 54
End: 2024-08-08
Payer: COMMERCIAL

## 2024-08-08 ENCOUNTER — TELEPHONE (OUTPATIENT)
Dept: FAMILY MEDICINE CLINIC | Facility: CLINIC | Age: 54
End: 2024-08-08

## 2024-08-08 VITALS
RESPIRATION RATE: 16 BRPM | OXYGEN SATURATION: 99 % | HEART RATE: 63 BPM | WEIGHT: 144 LBS | DIASTOLIC BLOOD PRESSURE: 76 MMHG | HEIGHT: 67 IN | BODY MASS INDEX: 22.6 KG/M2 | SYSTOLIC BLOOD PRESSURE: 126 MMHG

## 2024-08-08 DIAGNOSIS — R07.9 CHEST PAIN, UNSPECIFIED TYPE: Primary | ICD-10-CM

## 2024-08-08 DIAGNOSIS — I42.9 CARDIOMYOPATHY, UNSPECIFIED TYPE (HCC): ICD-10-CM

## 2024-08-08 DIAGNOSIS — R93.89 ABNORMAL CHEST CT: ICD-10-CM

## 2024-08-08 DIAGNOSIS — I77.71 CAROTID ARTERY DISSECTION (HCC): ICD-10-CM

## 2024-08-08 DIAGNOSIS — I77.3 FIBROMUSCULAR DYSPLASIA (HCC): ICD-10-CM

## 2024-08-08 DIAGNOSIS — Z95.810 AUTOMATIC IMPLANTABLE CARDIOVERTER-DEFIBRILLATOR IN SITU: ICD-10-CM

## 2024-08-08 DIAGNOSIS — R91.1 LUNG NODULE: ICD-10-CM

## 2024-08-08 PROCEDURE — 99214 OFFICE O/P EST MOD 30 MIN: CPT | Performed by: FAMILY MEDICINE

## 2024-08-08 RX ORDER — AZITHROMYCIN 250 MG/1
TABLET, FILM COATED ORAL
Qty: 6 TABLET | Refills: 0 | Status: SHIPPED | OUTPATIENT
Start: 2024-08-08 | End: 2024-08-12

## 2024-08-08 NOTE — PROGRESS NOTES
HPI:   Palak Berrios is a 54 year old female who presents for ED follow up    Pt started to feel lightheaded over the weekend while doing a garage sale on Saturday   Pt started to feel chest pain on Monday and worse on Tuesday   Pt was seen in the ED  Discussed leg ultrasound and CTA   It is a pressing and squeezing feeling and it does not feel normal - off and on   Never a smoker   No use of nitroglycerin   Second hand smoke exposure in her 20's   Clifton sob when chasing after her twin grand kids - almost 2 years old     Pt reports normal bp at home       Current Outpatient Medications   Medication Sig Dispense Refill    Rimegepant Sulfate (NURTEC) 75 MG Oral Tablet Dispersible Take 1 tablet by mouth every other day.      Multiple Vitamin (MULTIVITAMIN ADULT OR) Take 1 tablet by mouth daily.      Cobalamin Combinations (VITAMIN B12-FOLIC ACID OR) Take 1 tablet by mouth daily.      butalbital-acetaminophen-caffeine -40 MG Oral Tab TAKE 1 TO 2 TABLETS BY MOUTH EVERY 4 TO 6 HOURS AS NEEDED FOR HEADACHE 60 tablet 5    zonisamide 100 MG Oral Cap Take 1 capsule (100 mg total) by mouth daily. 90 capsule 3    atorvastatin 40 MG Oral Tab Take 1 tablet (40 mg total) by mouth nightly. 90 tablet 3    XARELTO 20 MG Oral Tab TAKE 1 TABLET BY MOUTH DAILY 90 tablet 3    amLODIPine Besylate (NORVASC) 5 MG Oral Tab Take 1 tablet (5 mg total) by mouth daily. 90 tablet 3      Past Medical History:    Abdominal pain    AICD (automatic cardioverter/defibrillator) present    Automatic implantable cardiac defibrillator in situ    Blood clot in vein    Cardiac arrest (HCC)    Cardiac defibrillator in place    Cardiomyopathy (HCC)    Resolved.     Chronic headaches    Dissection of coronary artery    DVT (deep venous thrombosis) ([facility])    Edema    H/O arterial dissection    LAD    Hemorrhoids    Hoarseness, chronic    Hyperlipidemia    Migraines    Other pulmonary embolism and infarction    Postpartum, Aug 2005.     PONV  (postoperative nausea and vomiting)    Pulmonary embolism (HCC)    Secondary hypercoagulability disorder (HCC)    Protein S deficiency, W/heterzygous MTHFR mutation.     Spastic colon    Stented coronary artery    Thyromegaly    Tricuspid valve disorder    Vasculitis (HCC)    Involving cerebral vascular bed & R carotid.    Vasospasm (HCC)    During catheter introduction in R coronary artery, Jan 2007.     Ventricular fibrillation (HCC)    Arrest, 2 weeks post delivery of 4th child, Aug 2005.     Wears glasses      Past Surgical History:   Procedure Laterality Date    Angioplasty (coronary)  2005     2.25x x 18mm Vision x 2 stents in LADm     Angioplasty (coronary)  2007    2.5 x 8 mm Vision in LADp    Cardiac pacemaker placement      Colonoscopy      Egd      Hysterectomy  07/2021    Other surgical history  8/2005, 1/2009    stents, ICD, mini vision     Other surgical history  8/2005    ICD implantation      Family History   Problem Relation Age of Onset    Diabetes Other         grandmother     Cancer Other         grandmother     Stroke Other         grandmother     Heart Disease Other         grandmother     Hypertension Father     Hypertension Mother     Colon Polyps Mother     Other (VSD & ASD) Other         child born with, repaired at birth     Colon Cancer Maternal Grandmother     Diabetes Maternal Grandmother     Hypertension Maternal Grandmother     Heart Attack Maternal Grandmother     Stroke Maternal Grandmother       Social History:   Social History     Socioeconomic History    Marital status:     Number of children: 4   Occupational History    Occupation: Dev Director     Employer: [employer]    Occupation: stay at home mom   Tobacco Use    Smoking status: Never     Passive exposure: Never    Smokeless tobacco: Never   Vaping Use    Vaping status: Never Used   Substance and Sexual Activity    Alcohol use: Yes     Alcohol/week: 0.0 standard drinks of alcohol     Comment: socially    Drug  use: No   Other Topics Concern    Caffeine Concern Yes     Comment: 1 cup of coffee daily    Exercise Yes     Comment: 5 x a week, walking     Occ: . : 4. 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  Diet: watches calories closely     REVIEW OF SYSTEMS:   GENERAL: feels well otherwise  SKIN: denies any unusual skin lesions  EYES:denies blurred vision or double vision  HEENT: denies nasal congestion, sinus pain or ST  LUNGS: denies shortness of breath with exertion  CARDIOVASCULAR: denies chest pain on exertion  GI: denies abdominal pain,denies heartburn  MUSCULOSKELETAL: denies back pain  PSYCHE: denies depression or anxiety  HEMATOLOGIC: denies hx of anemia  ENDOCRINE: denies thyroid history  ALL/ASTHMA: denies asthma    EXAM:   /76   Pulse 63   Resp 16   Ht 5' 7\" (1.702 m)   Wt 144 lb (65.3 kg)   SpO2 99%   BMI 22.55 kg/m²   Body mass index is 22.55 kg/m².   GENERAL: alert and oriented X 3, well developed, well nourished,in no apparent distress  CARDIO: RRR without murmur  LUNGS: clear to auscultation  NECK: supple,no adenopathy,+thyromegaly, no jvd, no carotid bruits   HEENT: atraumatic, normocephalic,ears and throat are clear  EYES:PERRLA, EOMI, normal,conjunctiva are clear  SKIN: norashes,no suspicious lesions  GI: good BS's,no masses, HSM or tenderness  CHEST: no chest tenderness  MUSCULOSKELETAL: back is not tender,FROM of the back  EXTREMITIES: no cyanosis, clubbing or edema  NEURO: cranial nerves are intact,motor and sensory are intact    ASSESSMENT AND PLAN:   Palak Berrios is a 54 year old female who presents for annual physical.    1. Chest pain, unspecified type  Seeing cards today  Discussed ER parameters     2. Cardiomyopathy, unspecified type (HCC)      3. Carotid artery dissection (HCC)      4. Automatic implantable cardioverter-defibrillator in situ      5. Fibromuscular dysplasia (HCC)      6. Lung nodule      7. Abnormal chest  CT  Trial of abx  - azithromycin (ZITHROMAX Z-VADIM) 250 MG Oral Tab; Take 2 tablets (500 mg total) by mouth daily for 1 day, THEN 1 tablet (250 mg total) daily for 4 days.  Dispense: 6 tablet; Refill: 0    Questions answered and patient indicates understanding of these issues and agrees to the plan.  Follow up in 1 mo or sooner if needed.

## 2024-08-21 DIAGNOSIS — G89.29 CHRONIC INTRACTABLE HEADACHE, UNSPECIFIED HEADACHE TYPE: ICD-10-CM

## 2024-08-21 DIAGNOSIS — G43.011 INTRACTABLE MIGRAINE WITHOUT AURA AND WITH STATUS MIGRAINOSUS: ICD-10-CM

## 2024-08-21 DIAGNOSIS — R51.9 CHRONIC INTRACTABLE HEADACHE, UNSPECIFIED HEADACHE TYPE: ICD-10-CM

## 2024-08-21 RX ORDER — BUTALBITAL, ACETAMINOPHEN AND CAFFEINE 50; 325; 40 MG/1; MG/1; MG/1
TABLET ORAL
Qty: 60 TABLET | Refills: 5 | Status: SHIPPED | OUTPATIENT
Start: 2024-08-21

## 2024-08-21 NOTE — TELEPHONE ENCOUNTER
Medication: Butalbital -40 mg     Date of last refill: 03/27/2024 with 5 axdt refills  [place]      Last office visit: 06/28/2024  Due back to clinic per last office note:    Date next office visit scheduled:    Future Appointments   Date Time Provider Department Center   9/30/2024 11:20 AM Roman Morataya MD [facility] [place]   11/13/2024  7:30 AM Carmen Carrillo DO [facility] [address]   12/9/2024  1:00 PM Roman Morataya MD [facility] [place]   12/31/2024  8:40 AM Roman Morataya MD [facility] [place]           Last OV note recommendation:    Botox

## 2024-09-11 ENCOUNTER — LAB ENCOUNTER (OUTPATIENT)
Dept: LAB | Age: 54
End: 2024-09-11
Attending: INTERNAL MEDICINE
Payer: COMMERCIAL

## 2024-09-11 ENCOUNTER — HOSPITAL ENCOUNTER (OUTPATIENT)
Dept: GENERAL RADIOLOGY | Age: 54
Discharge: HOME | End: 2024-09-11
Attending: INTERNAL MEDICINE
Payer: COMMERCIAL

## 2024-09-11 ENCOUNTER — EKG ENCOUNTER (OUTPATIENT)
Dept: LAB | Age: 54
End: 2024-09-11
Attending: INTERNAL MEDICINE
Payer: COMMERCIAL

## 2024-09-11 DIAGNOSIS — Z01.810 INPLANTABLE CARDIOVERTER-DEFIBRILLATOR IN PLACE, PRE-OPERATIVE CARDIOVASCULAR EXAMINATION: ICD-10-CM

## 2024-09-11 DIAGNOSIS — Z95.810 AUTOMATIC IMPLANTABLE CARDIAC DEFIBRILLATOR IN SITU: ICD-10-CM

## 2024-09-11 DIAGNOSIS — I77.71 DISSECTION OF CAROTID ARTERY (HCC): ICD-10-CM

## 2024-09-11 DIAGNOSIS — Z95.810 INPLANTABLE CARDIOVERTER-DEFIBRILLATOR IN PLACE, PRE-OPERATIVE CARDIOVASCULAR EXAMINATION: ICD-10-CM

## 2024-09-11 DIAGNOSIS — I77.71 DISSECTION OF CAROTID ARTERY (HCC): Primary | ICD-10-CM

## 2024-09-11 LAB
ANION GAP SERPL CALC-SCNC: 6 MMOL/L (ref 0–18)
ANTIBODY SCREEN: NEGATIVE
APTT PPP: 36.8 SECONDS (ref 23–36)
BASOPHILS # BLD AUTO: 0.03 X10(3) UL (ref 0–0.2)
BASOPHILS NFR BLD AUTO: 0.9 %
BUN BLD-MCNC: 9 MG/DL (ref 9–23)
CALCIUM BLD-MCNC: 10.2 MG/DL (ref 8.7–10.4)
CHLORIDE SERPL-SCNC: 108 MMOL/L (ref 98–112)
CO2 SERPL-SCNC: 29 MMOL/L (ref 21–32)
CREAT BLD-MCNC: 0.81 MG/DL
EGFRCR SERPLBLD CKD-EPI 2021: 86 ML/MIN/1.73M2 (ref 60–?)
EOSINOPHIL # BLD AUTO: 0.12 X10(3) UL (ref 0–0.7)
EOSINOPHIL NFR BLD AUTO: 3.5 %
ERYTHROCYTE [DISTWIDTH] IN BLOOD BY AUTOMATED COUNT: 13 %
FASTING STATUS PATIENT QL REPORTED: YES
GLUCOSE BLD-MCNC: 81 MG/DL (ref 70–99)
HCT VFR BLD AUTO: 43.2 %
HGB BLD-MCNC: 14.3 G/DL
IMM GRANULOCYTES # BLD AUTO: 0.02 X10(3) UL (ref 0–1)
IMM GRANULOCYTES NFR BLD: 0.6 %
INR BLD: 1.15 (ref 0.8–1.2)
LYMPHOCYTES # BLD AUTO: 0.77 X10(3) UL (ref 1–4)
LYMPHOCYTES NFR BLD AUTO: 22.3 %
MCH RBC QN AUTO: 29.9 PG (ref 26–34)
MCHC RBC AUTO-ENTMCNC: 33.1 G/DL (ref 31–37)
MCV RBC AUTO: 90.2 FL
MONOCYTES # BLD AUTO: 0.35 X10(3) UL (ref 0.1–1)
MONOCYTES NFR BLD AUTO: 10.1 %
NEUTROPHILS # BLD AUTO: 2.17 X10 (3) UL (ref 1.5–7.7)
NEUTROPHILS # BLD AUTO: 2.17 X10(3) UL (ref 1.5–7.7)
NEUTROPHILS NFR BLD AUTO: 62.6 %
OSMOLALITY SERPL CALC.SUM OF ELEC: 294 MOSM/KG (ref 275–295)
PLATELET # BLD AUTO: 224 10(3)UL (ref 150–450)
POTASSIUM SERPL-SCNC: 4.5 MMOL/L (ref 3.5–5.1)
PROTHROMBIN TIME: 14.8 SECONDS (ref 11.6–14.8)
RBC # BLD AUTO: 4.79 X10(6)UL
RH BLOOD TYPE: POSITIVE
SODIUM SERPL-SCNC: 143 MMOL/L (ref 136–145)
WBC # BLD AUTO: 3.5 X10(3) UL (ref 4–11)

## 2024-09-11 PROCEDURE — 86901 BLOOD TYPING SEROLOGIC RH(D): CPT

## 2024-09-11 PROCEDURE — 86850 RBC ANTIBODY SCREEN: CPT

## 2024-09-11 PROCEDURE — 36415 COLL VENOUS BLD VENIPUNCTURE: CPT

## 2024-09-11 PROCEDURE — 71046 X-RAY EXAM CHEST 2 VIEWS: CPT | Performed by: INTERNAL MEDICINE

## 2024-09-11 PROCEDURE — 80048 BASIC METABOLIC PNL TOTAL CA: CPT

## 2024-09-11 PROCEDURE — 93010 ELECTROCARDIOGRAM REPORT: CPT | Performed by: INTERNAL MEDICINE

## 2024-09-11 PROCEDURE — 85610 PROTHROMBIN TIME: CPT

## 2024-09-11 PROCEDURE — 86900 BLOOD TYPING SEROLOGIC ABO: CPT

## 2024-09-11 PROCEDURE — 93005 ELECTROCARDIOGRAM TRACING: CPT

## 2024-09-11 PROCEDURE — 85730 THROMBOPLASTIN TIME PARTIAL: CPT

## 2024-09-11 PROCEDURE — 85025 COMPLETE CBC W/AUTO DIFF WBC: CPT

## 2024-09-13 LAB
ATRIAL RATE: 59 BPM
P AXIS: 39 DEGREES
P-R INTERVAL: 158 MS
Q-T INTERVAL: 426 MS
QRS DURATION: 62 MS
QTC CALCULATION (BEZET): 421 MS
R AXIS: 73 DEGREES
T AXIS: 68 DEGREES
VENTRICULAR RATE: 59 BPM

## 2024-09-17 NOTE — PAT NURSING NOTE
[email or web address] ICD, reps notified via email (Ximena Lewis, Les Newberry) on 9/17.     Pre-Procedure Instructions     Pre-Procedure Instructions: Encouraged to check in electronically via [employer]     Visitor Instructions     Adult Patients: 2 Adult Care Partners can accompany the patient day of procedure. 2 Care Partners may visit [phone] during inpatient stay     Pre-Op Instructions     Scheduled Surgery: You are scheduled for Cardiac Surgery     Date of Procedure: 09/27/24 Friday     Diet Instructions: Do not eat or drink after 10pm. This includes water, gum, candy and food.     Medication Instructions: Do not take any medications the morning of your surgery.     Do not take the following Blood Thinner(s): Take LAST dose of Xarelto on Saturday 9/21.      START lovenox 60 mg twice daily on Sunday 9/22. HOLD/DO NOT take Lovenox on day of procedure (take last dose on Thursday evening 9/26 pm)     Medications to Stop: Last dose of vitamins, supplements, and NSAID's (ex. Ibuprofen, Excederin, Aleve, Diclofenac, and any gels having steroids) 7 days prior to surgery (Do not count the date of surgery)     Skin Prep: Shower with Dial Soap the morning of your surgery (or any antibacterial soap).     On the day of your procedure please make sure to arrive at: 0530 am     This is going to be a tentative time of arrival for surgery. We will call you the buisness day prior to your surgery in the late afternoon to reconfirm your arrival. Please check your voicemail for a message.     Admit/Discharge Status: You will be admitted to the hospital after surgery.;Your recovery will be approximately 2 hours after the completion of your surgery prior to your discharge to go home.;Cannot take uber or cab unless approved by physician.;If sedation is given, you WILL NOT be able to drive home. You will need a responsible adult  to drive you home.     Discharge Teaching: Your nurse will give you specific instructions before  discharge.;Any questions, please call the surgeon's office     Additional Instructions: Bring insurance card(s) and picture ID;Leave all valuables at home, including jewelry;Wear appropriate clothing;No contacts, wear glasses;You'll receive arrival time 1 business day prior to scheduled surgery     Park in the [facility] garage at [facility].  Check in at the [facility] reception desk. Our  will be there to check you in for your procedure. Please bring your insurance cards and ID with you.  parking is available starting at 6 am     If you are scheduled to arrive early for 5:30 am, the [facility] reception desk does not open till 5:30 am. It may be dark, but you are in the correct location.      We are open M-F from 8am- 5pm. We are closed on holidays and weekends. We can be reached at [phone].            Thank you,           **Please DO NOT respond to this message, the inbasket is not monitored for messages**

## 2024-09-18 ENCOUNTER — HOSPITAL ENCOUNTER (OUTPATIENT)
Dept: BONE DENSITY | Age: 54
Discharge: HOME | End: 2024-09-18
Attending: FAMILY MEDICINE
Payer: COMMERCIAL

## 2024-09-18 DIAGNOSIS — Z13.820 SCREENING FOR OSTEOPOROSIS: ICD-10-CM

## 2024-09-18 PROCEDURE — 77080 DXA BONE DENSITY AXIAL: CPT | Performed by: FAMILY MEDICINE

## 2024-09-25 ENCOUNTER — ANESTHESIA EVENT (OUTPATIENT)
Dept: CARDIAC SURGERY | Facility: HOSPITAL | Age: 54
End: 2024-09-25
Payer: COMMERCIAL

## 2024-09-27 ENCOUNTER — HOSPITAL ENCOUNTER (INPATIENT)
Facility: HOSPITAL | Age: 54
LOS: 1 days | Discharge: HOME | DRG: 277 | End: 2024-09-28
Attending: INTERNAL MEDICINE | Admitting: INTERNAL MEDICINE
Payer: COMMERCIAL

## 2024-09-27 ENCOUNTER — APPOINTMENT (OUTPATIENT)
Dept: GENERAL RADIOLOGY | Facility: HOSPITAL | Age: 54
End: 2024-09-27
Attending: INTERNAL MEDICINE
Payer: COMMERCIAL

## 2024-09-27 ENCOUNTER — HOSPITAL ENCOUNTER (INPATIENT)
Facility: HOSPITAL | Age: 54
LOS: 1 days | Discharge: HOME | End: 2024-09-28
Attending: INTERNAL MEDICINE | Admitting: INTERNAL MEDICINE
Payer: COMMERCIAL

## 2024-09-27 ENCOUNTER — APPOINTMENT (OUTPATIENT)
Dept: GENERAL RADIOLOGY | Facility: HOSPITAL | Age: 54
DRG: 277 | End: 2024-09-27
Attending: INTERNAL MEDICINE
Payer: COMMERCIAL

## 2024-09-27 ENCOUNTER — ANESTHESIA (OUTPATIENT)
Dept: CARDIAC SURGERY | Facility: HOSPITAL | Age: 54
End: 2024-09-27
Payer: COMMERCIAL

## 2024-09-27 DIAGNOSIS — L76.82 INCISIONAL PAIN: Primary | ICD-10-CM

## 2024-09-27 PROBLEM — Z01.818 PRE-OP TESTING: Status: ACTIVE | Noted: 2024-09-27

## 2024-09-27 PROCEDURE — 71045 X-RAY EXAM CHEST 1 VIEW: CPT | Performed by: INTERNAL MEDICINE

## 2024-09-27 PROCEDURE — 02PA3MZ REMOVAL OF CARDIAC LEAD FROM HEART, PERCUTANEOUS APPROACH: ICD-10-PCS | Performed by: INTERNAL MEDICINE

## 2024-09-27 PROCEDURE — 05F33ZZ FRAGMENTATION OF RIGHT INNOMINATE VEIN, PERCUTANEOUS APPROACH: ICD-10-PCS | Performed by: INTERNAL MEDICINE

## 2024-09-27 PROCEDURE — 0JD63ZZ EXTRACTION OF CHEST SUBCUTANEOUS TISSUE AND FASCIA, PERCUTANEOUS APPROACH: ICD-10-PCS | Performed by: INTERNAL MEDICINE

## 2024-09-27 PROCEDURE — 4B02XTZ MEASUREMENT OF CARDIAC DEFIBRILLATOR, EXTERNAL APPROACH: ICD-10-PCS | Performed by: INTERNAL MEDICINE

## 2024-09-27 PROCEDURE — 93312 ECHO TRANSESOPHAGEAL: CPT | Performed by: INTERNAL MEDICINE

## 2024-09-27 PROCEDURE — 0JPT0PZ REMOVAL OF CARDIAC RHYTHM RELATED DEVICE FROM TRUNK SUBCUTANEOUS TISSUE AND FASCIA, OPEN APPROACH: ICD-10-PCS | Performed by: INTERNAL MEDICINE

## 2024-09-27 PROCEDURE — 0JH60FZ INSERTION OF SUBCUTANEOUS DEFIBRILLATOR LEAD INTO CHEST SUBCUTANEOUS TISSUE AND FASCIA, OPEN APPROACH: ICD-10-PCS | Performed by: INTERNAL MEDICINE

## 2024-09-27 PROCEDURE — 0JH608Z INSERTION OF DEFIBRILLATOR GENERATOR INTO CHEST SUBCUTANEOUS TISSUE AND FASCIA, OPEN APPROACH: ICD-10-PCS | Performed by: INTERNAL MEDICINE

## 2024-09-27 DEVICE — IMPLANTABLE DEVICE: Type: IMPLANTABLE DEVICE | Site: GROIN | Status: FUNCTIONAL

## 2024-09-27 DEVICE — SUBCUTANEOUS IMPLANTABLE CARDIOVERTER DEFIBRILLATOR
Type: IMPLANTABLE DEVICE | Status: FUNCTIONAL
Brand: EMBLEM™ MRI S-ICD

## 2024-09-27 DEVICE — SUBCUTANEOUS ELECTRODE
Type: IMPLANTABLE DEVICE | Status: FUNCTIONAL
Brand: EMBLEM™ S-ICD

## 2024-09-27 RX ORDER — AMLODIPINE BESYLATE 5 MG/1
5 TABLET ORAL DAILY
Status: DISCONTINUED | OUTPATIENT
Start: 2024-09-28 | End: 2024-09-28

## 2024-09-27 RX ORDER — EPHEDRINE SULFATE 50 MG/ML
INJECTION INTRAVENOUS AS NEEDED
Status: DISCONTINUED | OUTPATIENT
Start: 2024-09-27 | End: 2024-09-27 | Stop reason: SURG

## 2024-09-27 RX ORDER — SODIUM CHLORIDE, SODIUM LACTATE, POTASSIUM CHLORIDE, CALCIUM CHLORIDE 600; 310; 30; 20 MG/100ML; MG/100ML; MG/100ML; MG/100ML
INJECTION, SOLUTION INTRAVENOUS CONTINUOUS
Status: DISCONTINUED | OUTPATIENT
Start: 2024-09-27 | End: 2024-09-27 | Stop reason: HOSPADM

## 2024-09-27 RX ORDER — ACETAMINOPHEN 500 MG
1000 TABLET ORAL ONCE AS NEEDED
Status: COMPLETED | OUTPATIENT
Start: 2024-09-27 | End: 2024-09-27

## 2024-09-27 RX ORDER — HYDROMORPHONE HYDROCHLORIDE 1 MG/ML
0.2 INJECTION, SOLUTION INTRAMUSCULAR; INTRAVENOUS; SUBCUTANEOUS EVERY 5 MIN PRN
Status: DISCONTINUED | OUTPATIENT
Start: 2024-09-27 | End: 2024-09-27 | Stop reason: HOSPADM

## 2024-09-27 RX ORDER — DEXAMETHASONE SODIUM PHOSPHATE 4 MG/ML
VIAL (ML) INJECTION AS NEEDED
Status: DISCONTINUED | OUTPATIENT
Start: 2024-09-27 | End: 2024-09-27 | Stop reason: SURG

## 2024-09-27 RX ORDER — ACETAMINOPHEN AND CODEINE PHOSPHATE 300; 30 MG/1; MG/1
2 TABLET ORAL EVERY 4 HOURS PRN
Status: DISCONTINUED | OUTPATIENT
Start: 2024-09-27 | End: 2024-09-27 | Stop reason: HOSPADM

## 2024-09-27 RX ORDER — PROCHLORPERAZINE EDISYLATE 5 MG/ML
5 INJECTION INTRAMUSCULAR; INTRAVENOUS EVERY 8 HOURS PRN
Status: DISCONTINUED | OUTPATIENT
Start: 2024-09-27 | End: 2024-09-27 | Stop reason: HOSPADM

## 2024-09-27 RX ORDER — SODIUM CHLORIDE 9 MG/ML
INJECTION, SOLUTION INTRAVENOUS
Status: DISCONTINUED | OUTPATIENT
Start: 2024-09-28 | End: 2024-09-27 | Stop reason: HOSPADM

## 2024-09-27 RX ORDER — MIDAZOLAM HYDROCHLORIDE 1 MG/ML
INJECTION INTRAMUSCULAR; INTRAVENOUS AS NEEDED
Status: DISCONTINUED | OUTPATIENT
Start: 2024-09-27 | End: 2024-09-27 | Stop reason: SURG

## 2024-09-27 RX ORDER — HYDROCODONE BITARTRATE AND ACETAMINOPHEN 5; 325 MG/1; MG/1
2 TABLET ORAL ONCE AS NEEDED
Status: COMPLETED | OUTPATIENT
Start: 2024-09-27 | End: 2024-09-27

## 2024-09-27 RX ORDER — CHLORHEXIDINE GLUCONATE 40 MG/ML
SOLUTION TOPICAL
Status: DISCONTINUED | OUTPATIENT
Start: 2024-09-28 | End: 2024-09-27 | Stop reason: HOSPADM

## 2024-09-27 RX ORDER — HYDROCODONE BITARTRATE AND ACETAMINOPHEN 5; 325 MG/1; MG/1
TABLET ORAL
Status: COMPLETED
Start: 2024-09-27 | End: 2024-09-27

## 2024-09-27 RX ORDER — TRAMADOL HYDROCHLORIDE 50 MG/1
50 TABLET ORAL EVERY 6 HOURS PRN
Status: DISCONTINUED | OUTPATIENT
Start: 2024-09-27 | End: 2024-09-28

## 2024-09-27 RX ORDER — LIDOCAINE HYDROCHLORIDE 10 MG/ML
INJECTION, SOLUTION EPIDURAL; INFILTRATION; INTRACAUDAL; PERINEURAL AS NEEDED
Status: DISCONTINUED | OUTPATIENT
Start: 2024-09-27 | End: 2024-09-27 | Stop reason: SURG

## 2024-09-27 RX ORDER — HYDROMORPHONE HYDROCHLORIDE 1 MG/ML
0.4 INJECTION, SOLUTION INTRAMUSCULAR; INTRAVENOUS; SUBCUTANEOUS EVERY 5 MIN PRN
Status: DISCONTINUED | OUTPATIENT
Start: 2024-09-27 | End: 2024-09-27 | Stop reason: HOSPADM

## 2024-09-27 RX ORDER — ONDANSETRON 2 MG/ML
INJECTION INTRAMUSCULAR; INTRAVENOUS AS NEEDED
Status: DISCONTINUED | OUTPATIENT
Start: 2024-09-27 | End: 2024-09-27 | Stop reason: SURG

## 2024-09-27 RX ORDER — NALOXONE HYDROCHLORIDE 0.4 MG/ML
0.08 INJECTION, SOLUTION INTRAMUSCULAR; INTRAVENOUS; SUBCUTANEOUS AS NEEDED
Status: DISCONTINUED | OUTPATIENT
Start: 2024-09-27 | End: 2024-09-27 | Stop reason: HOSPADM

## 2024-09-27 RX ORDER — ATORVASTATIN CALCIUM 40 MG/1
40 TABLET, FILM COATED ORAL NIGHTLY
Status: DISCONTINUED | OUTPATIENT
Start: 2024-09-27 | End: 2024-09-28

## 2024-09-27 RX ORDER — MIDAZOLAM HYDROCHLORIDE 1 MG/ML
1 INJECTION INTRAMUSCULAR; INTRAVENOUS EVERY 5 MIN PRN
Status: DISCONTINUED | OUTPATIENT
Start: 2024-09-27 | End: 2024-09-27 | Stop reason: HOSPADM

## 2024-09-27 RX ORDER — SODIUM CHLORIDE, SODIUM LACTATE, POTASSIUM CHLORIDE, CALCIUM CHLORIDE 600; 310; 30; 20 MG/100ML; MG/100ML; MG/100ML; MG/100ML
INJECTION, SOLUTION INTRAVENOUS CONTINUOUS PRN
Status: DISCONTINUED | OUTPATIENT
Start: 2024-09-27 | End: 2024-09-27 | Stop reason: SURG

## 2024-09-27 RX ORDER — HYDROCODONE BITARTRATE AND ACETAMINOPHEN 5; 325 MG/1; MG/1
1 TABLET ORAL ONCE AS NEEDED
Status: COMPLETED | OUTPATIENT
Start: 2024-09-27 | End: 2024-09-27

## 2024-09-27 RX ORDER — ONDANSETRON 2 MG/ML
4 INJECTION INTRAMUSCULAR; INTRAVENOUS EVERY 6 HOURS PRN
Status: DISCONTINUED | OUTPATIENT
Start: 2024-09-27 | End: 2024-09-27 | Stop reason: HOSPADM

## 2024-09-27 RX ORDER — ZONISAMIDE 100 MG/1
100 CAPSULE ORAL DAILY
Status: DISCONTINUED | OUTPATIENT
Start: 2024-09-27 | End: 2024-09-28

## 2024-09-27 RX ORDER — HYDROMORPHONE HYDROCHLORIDE 1 MG/ML
0.6 INJECTION, SOLUTION INTRAMUSCULAR; INTRAVENOUS; SUBCUTANEOUS EVERY 5 MIN PRN
Status: DISCONTINUED | OUTPATIENT
Start: 2024-09-27 | End: 2024-09-27 | Stop reason: HOSPADM

## 2024-09-27 RX ORDER — MEPERIDINE HYDROCHLORIDE 25 MG/ML
12.5 INJECTION INTRAMUSCULAR; INTRAVENOUS; SUBCUTANEOUS AS NEEDED
Status: DISCONTINUED | OUTPATIENT
Start: 2024-09-27 | End: 2024-09-27 | Stop reason: HOSPADM

## 2024-09-27 RX ORDER — ACETAMINOPHEN AND CODEINE PHOSPHATE 300; 30 MG/1; MG/1
1 TABLET ORAL EVERY 4 HOURS PRN
Status: DISCONTINUED | OUTPATIENT
Start: 2024-09-27 | End: 2024-09-27 | Stop reason: HOSPADM

## 2024-09-27 RX ORDER — ROCURONIUM BROMIDE 10 MG/ML
INJECTION, SOLUTION INTRAVENOUS AS NEEDED
Status: DISCONTINUED | OUTPATIENT
Start: 2024-09-27 | End: 2024-09-27 | Stop reason: SURG

## 2024-09-27 RX ORDER — HYDROCODONE BITARTRATE AND ACETAMINOPHEN 5; 325 MG/1; MG/1
2 TABLET ORAL ONCE
Status: COMPLETED | OUTPATIENT
Start: 2024-09-27 | End: 2024-09-27

## 2024-09-27 RX ORDER — ACETAMINOPHEN 325 MG/1
650 TABLET ORAL EVERY 4 HOURS PRN
Status: DISCONTINUED | OUTPATIENT
Start: 2024-09-27 | End: 2024-09-27 | Stop reason: HOSPADM

## 2024-09-27 RX ADMIN — ROCURONIUM BROMIDE 10 MG: 10 INJECTION, SOLUTION INTRAVENOUS at 11:35:00

## 2024-09-27 RX ADMIN — ROCURONIUM BROMIDE 10 MG: 10 INJECTION, SOLUTION INTRAVENOUS at 10:31:00

## 2024-09-27 RX ADMIN — MIDAZOLAM HYDROCHLORIDE 2 MG: 1 INJECTION INTRAMUSCULAR; INTRAVENOUS at 06:52:00

## 2024-09-27 RX ADMIN — LIDOCAINE HYDROCHLORIDE 50 MG: 10 INJECTION, SOLUTION EPIDURAL; INFILTRATION; INTRACAUDAL; PERINEURAL at 06:55:00

## 2024-09-27 RX ADMIN — DEXAMETHASONE SODIUM PHOSPHATE 4 MG: 4 MG/ML VIAL (ML) INJECTION at 07:00:00

## 2024-09-27 RX ADMIN — ROCURONIUM BROMIDE 20 MG: 10 INJECTION, SOLUTION INTRAVENOUS at 09:37:00

## 2024-09-27 RX ADMIN — ONDANSETRON 4 MG: 2 INJECTION INTRAMUSCULAR; INTRAVENOUS at 12:01:00

## 2024-09-27 RX ADMIN — ROCURONIUM BROMIDE 10 MG: 10 INJECTION, SOLUTION INTRAVENOUS at 08:00:00

## 2024-09-27 RX ADMIN — SODIUM CHLORIDE, SODIUM LACTATE, POTASSIUM CHLORIDE, CALCIUM CHLORIDE: 600; 310; 30; 20 INJECTION, SOLUTION INTRAVENOUS at 06:49:00

## 2024-09-27 RX ADMIN — EPHEDRINE SULFATE 5 MG: 50 INJECTION INTRAVENOUS at 11:18:00

## 2024-09-27 RX ADMIN — ROCURONIUM BROMIDE 10 MG: 10 INJECTION, SOLUTION INTRAVENOUS at 08:36:00

## 2024-09-27 RX ADMIN — ROCURONIUM BROMIDE 20 MG: 10 INJECTION, SOLUTION INTRAVENOUS at 07:35:00

## 2024-09-27 RX ADMIN — ROCURONIUM BROMIDE 50 MG: 10 INJECTION, SOLUTION INTRAVENOUS at 06:56:00

## 2024-09-27 NOTE — ANESTHESIA POSTPROCEDURE EVALUATION
[facility]    Palak Berrios Patient Status:  Inpatient   Age/Gender 54 year old female MRN [id number]   Location [facility] POST ANESTHESIA CARE UNIT Attending Kary Munguia MD   Hosp Day # 0 PCP Carmen Carrillo DO       Anesthesia Post-op Note    EXTRACTION OF RIGHT VENTRICULAR IMPLANTABLE CARDIOVERTER-DEFIBRILLATOR LEAD AND GENERATOR, IMPLANTATION OF SUBCUTANEOUS IMPLANTABLE CARDIOVERTER DEFIBRILLATOR    Procedure Summary       Date: 09/27/24 Room / Location:  [facility]BRID /  [facility]    Anesthesia Start: 0649 Anesthesia Stop: 1240    Procedure: EXTRACTION OF RIGHT VENTRICULAR IMPLANTABLE CARDIOVERTER-DEFIBRILLATOR LEAD AND GENERATOR, IMPLANTATION OF SUBCUTANEOUS IMPLANTABLE CARDIOVERTER DEFIBRILLATOR Diagnosis: (INTERNAL CARDIAC DEFIBRILLATOR IN PLACE,INTERNAL CARDIAC DEFIBRILLATOR LEAD FRACTURE)    Surgeons: Kary Munguia MD Anesthesiologist: Martínez Ken MD    Anesthesia Type: general ASA Status: 3            Anesthesia Type: general    Vitals Value Taken Time   /74 09/27/24 1242   Temp 97 09/27/24 1254   Pulse 79 09/27/24 1254   Resp 10 09/27/24 1254   SpO2 100 % 09/27/24 1254   Vitals shown include unfiled device data.    Patient Location: PACU    Anesthesia Type: general    Airway Patency: patent    Postop Pain Control: adequate    Mental Status: mildly sedated but able to meaningfully participate in the post-anesthesia evaluation    Nausea/Vomiting: none    Cardiopulmonary/Hydration status: stable euvolemic    Complications: no apparent anesthesia related complications    Postop vital signs: stable    Dental Exam: Unchanged from Preop    Patient to be discharged from PACU when criteria met.

## 2024-09-27 NOTE — H&P
H&P    Palak Berrios Patient Status:  Inpatient    1970 MRN [id number]   Location [facility] CARDIOVASCULAR SURGERY Attending Kary Munguia MD   Hosp Day # 0 PCP Carmen Carrillo DO     Reason for Admission:  Lead extraction and device removal with placement of SICD.     Assessment/Plan:  Patient Active Problem List   Diagnosis    Chondromalacia of patella    Chronic headache    Ventricular fibrillation (HCC)    Migraines    Other pulmonary embolism and infarction    Vasospasm (HCC)    Vasculitis (HCC)    Cardiomyopathy (HCC)    Thyromegaly    Secondary hypercoagulability disorder (HCC)    Spastic colon    Tricuspid valve disorder    Edema    Hyperlipidemia    Automatic implantable cardioverter-defibrillator in situ    Dissection of coronary artery    Chest pain    MTHFR mutation    Carotid artery dissection (HCC)    Neck pain    Chronic intractable headache    Intractable episodic paroxysmal hemicrania    History of carotid artery dissection    Hoarse voice quality    Iliac artery dissection (HCC)    Fibromuscular dysplasia (HCC)    Adrenal adenoma     Pt with history of cardiac arrest, presents for RV lead extraction, and device removal. Placement of new SICD.     History of Present Illness:  Palak Berrios is a a(n) 54 year old female, presents for RV ICD lead removal and ICD removal due to lead fracture and device failure. Pt is to have an SICD implanted to follow.     History:  Past Medical History:    Abdominal pain    AICD (automatic cardioverter/defibrillator) present    Automatic implantable cardiac defibrillator in situ    Blood clot in vein    Cardiac arrest (HCC)    Cardiac defibrillator in place    Cardiomyopathy (HCC)    Resolved.     Chronic headaches    Dissection of coronary artery    DVT (deep venous thrombosis) (HCC)    Edema    H/O arterial dissection    LAD    Hemorrhoids    High cholesterol    Hoarseness, chronic    Hyperlipidemia    Migraines    Other pulmonary embolism  and infarction    Postpartum, Aug 2005.     PONV (postoperative nausea and vomiting)    Pulmonary embolism (HCC)    Secondary hypercoagulability disorder (HCC)    Protein S deficiency, W/heterzygous MTHFR mutation.     Spastic colon    Stented coronary artery    Thyromegaly    Tricuspid valve disorder    Vasculitis (HCC)    Involving cerebral vascular bed & R carotid.    Vasospasm (HCC)    During catheter introduction in R coronary artery, Jan 2007.     Ventricular fibrillation (HCC)    Arrest, 2 weeks post delivery of 4th child, Aug 2005.     Wears glasses     Past Surgical History:   Procedure Laterality Date    Angioplasty (coronary)  2005     2.25x x 18mm Vision x 2 stents in LADm     Angioplasty (coronary)  2007    2.5 x 8 mm Vision in LADp    Cardiac pacemaker placement      Cath drug eluting stent      Colonoscopy      Egd      Hysterectomy  07/2021    Other surgical history  8/2005, 1/2009    stents, ICD, mini vision     Other surgical history  08/2005    ICD implantation     Family History   Problem Relation Age of Onset    Diabetes Other         grandmother     Cancer Other         grandmother     Stroke Other         grandmother     Heart Disease Other         grandmother     Hypertension Father     Hypertension Mother     Colon Polyps Mother     Other (VSD & ASD) Other         child born with, repaired at birth     Colon Cancer Maternal Grandmother     Diabetes Maternal Grandmother     Hypertension Maternal Grandmother     Heart Attack Maternal Grandmother     Stroke Maternal Grandmother       reports that she has never smoked. She has never been exposed to tobacco smoke. She has never used smokeless tobacco. She reports current alcohol use. She reports that she does not use drugs.    Allergies:  Allergies   Allergen Reactions    Clavulanic Acid HIVES    Augmentin [Amoxicillin-Pot Clavulanate] HIVES    Cephalexin OTHER (SEE COMMENTS)     Oral, hives    Keflex [Cephalexin Monohydrate] HIVES        Medications:    Current Facility-Administered Medications:     [START ON 2024] chlorhexidine (Hibiclens) 4 % external liquid, , Topical, On Call    [START ON 2024] sodium chloride 0.9% infusion, , Intravenous, On Call    vancomycin (Vancocin) 1,000 mg in sodium chloride 0.9% 250 mL IVPB-ADDV, 15 mg/kg, Intravenous, 30 Min Pre-Op    Facility-Administered Medications Ordered in Other Encounters:     lidocaine PF (Xylocaine-MPF) 1% injection, , Injection, PRN    propofol (Diprivan) 10 MG/ML injection, , Intravenous, PRN    rocuronium (Zemuron) 50 mg/5mL injection, , Intravenous, PRN    dexamethasone (Decadron) 4 MG/ML injection, , Intravenous, PRN    ceFAZolin (Ancef) 2g in 10mL IV syringe premix, , Intravenous, PRN    lactated ringers infusion, , Intravenous, Continuous PRN    midazolam (Versed) 2 MG/2ML injection, , Intravenous, PRN    fentaNYL (Sublimaze) 50 mcg/mL injection, , Intravenous, PRN    Review of Systems:  All systems were reviewed and are negative except as described above in HPI.    Physical Exam:  Blood pressure 140/79, pulse 68, temperature 97 °F (36.1 °C), temperature source Temporal, resp. rate 12, height 66.5\", weight 142 lb (64.4 kg), SpO2 100%, not currently breastfeeding.  Temp (24hrs), Av °F (36.1 °C), Min:97 °F (36.1 °C), Max:97 °F (36.1 °C)    Wt Readings from Last 3 Encounters:   24 142 lb (64.4 kg)   24 144 lb (65.3 kg)   24 140 lb (63.5 kg)       Telemetry: SR  General: Alert and oriented in no apparent distress.  HEENT: No focal deficits.  Neck: No JVD, carotids 2+ no bruits.  Cardiac: Regular rate and rhythm, S1, S2 normal, no murmur, rub or gallop.  Lungs: Clear without wheezes, rales, rhonchi or dullness.  Normal excursions and effort.  Abdomen: Soft, non-tender.   Extremities: Without clubbing, cyanosis or edema.  Peripheral pulses are 2+.  Neurologic: Alert and oriented, normal affect.  Skin: Warm and dry.     Laboratories and  Data:  Diagnostics:    Labs:        Lab Results   Component Value Date    PT 19.4 (H) 06/29/2013    PT 40.8 (H) 07/11/2011    PT 39.0 (H) 06/30/2011    INR 1.15 09/11/2024    INR 1.47 (H) 02/28/2019    INR 1.33 (H) 06/15/2017         Kary Munguia MD  9/27/2024  9:36 AM    Kary Munguia MD  [facility]  Cardiac Electrophysiolgy  [phone]

## 2024-09-27 NOTE — PLAN OF CARE
Patient now s/p fractured RV ICD lead extraction this morning and implantation of subcutaneous ICD 9/27/24. History of LAD dissection with associated VF cardiac arrest.     Discussed with Dr Munguia, plan is the following:  - Will keep for monitoring overnight due to post-operative pain with subcutaneous implant. Starting tramadol and recommend discharging on 5 day course of pain medication.  - Resuming Xarelto at reduced 10 mg daily with dinner dosing this evening per Dr Munguia. History of recurrent DVT. Plan is to go back to Xarelto 20 mg daily with dinner dosing on Monday.    Santa Mireles PA-C

## 2024-09-27 NOTE — ANESTHESIA PROCEDURE NOTES
Peripheral IV  Date/Time: 9/27/2024 7:37 AM  Inserted by: Martínez Ken MD    Placement  Needle size: 18 G  Laterality: right  Location: antecubital  Local anesthetic: none  Site prep: chlorhexidine  Technique: ultrasound guided  Attempts: 1

## 2024-09-27 NOTE — DISCHARGE INSTRUCTIONS
Resume xarelto to mg fri. 9/27,9/28,9/29 then on Monday 9/30 increase xarelto to 20 mg nightly.  Follow up at pacemaker clinic in 5-10 days.  All other meds same as on admission.        Pacemaker and Defibrillator Discharge Instructions    Activity  Plan to rest tonight and tomorrow.  Avoid drinking alcohol for next 24 hours.  Do not drive after procedure until 1-week device check appointment, unless otherwise instructed by your cardiologist.   Wear sling for next 24 hours, then only at night as needed for 30 days to help assist with arm restrictions while sleeping.     Arm Restrictions:  For 30 days after implant:  do not lift arm with implanted device above your head or behind your back. Arm is to remain below your shoulder and in front of your body.   do not lift more than 10 lbs with affected arm   do not perform repetitive cycling motion with affected arm (swimming, golfing, bowling, tennis, exercise machines, raking, vacuuming, snow shoveling).   Dr. Carpenter Only Patients: Do not perform repetitive cycling motion for 90 days total    Incision Care/Bathing  Keep incision site completely dry for 5 days after surgery. After day 5, you can remove top dressing and shower, letting clean soapy water run over incision area, patting dry.   The white steri-strips placed directly over the incision will gradually fall off over the next few weeks and can be physically removed after 3 weeks. Do not take them off before this time. (If you have a zipline dressing, this will be removed by device nurse or MD in 4 weeks)   Keep procedure site clean and dry, do not apply any ointments, creams, lotions to the incision.   Look at your incision daily to monitor for signs and symptoms of infection (redness, swelling, drainage, foul odor from procedure site)  Do not cover incision with extra dressings or gauze unless otherwise instructed.   Do not submerge incision in water, take whirlpool baths or swim for 30 days or until site is  completely healed.     When to notify your physician:   If you have chest pain, shortness of breath or persistent cough  If you experience any signs of fever (temperature >101), chills, infection (redness, swelling, thick yellow drainage, foul order from procedure site)  New or worsening swelling of arm with implanted device   If an ICD was inserted and you receive a shock and have no symptoms, call [facility] device clinic. If you have symptoms (fainting, near fainting, dizziness, lightheadedness, chest pain, shortness of breath, palpitations), call 911.    [facility] Device [facility] Direct Line: [phone]. Monday-Friday. 8:30AM-4PM.   [facility] Main Office: [phone] Monday- Friday 8AM-5PM   [facility] Main Office: [phone] Monday-Friday 8AM-5PM

## 2024-09-27 NOTE — PROGRESS NOTES
Pt back from PACU via cart. See vitals flowsheet.  L lateral trunk dressing c/d/I, mild ecchymosis. l chest dressing aquacel dressing c/d/I. A line dressing site soft. Rt groin dressing c/d/I.hob flat.  1600 pt tolerated po well. Pain improved 3/10 from 10/10.tolerated po well. Cxr done awaiting results. Report given to Didier WONG

## 2024-09-27 NOTE — ANESTHESIA PROCEDURE NOTES
Procedure Performed: JANET       Start Time:  9/27/2024 7:15 AM       End Time:   9/27/2024 11:00 AM    Preanesthesia Checklist:  Patient identified, IV assessed, risks and benefits discussed, monitors and equipment assessed, procedure being performed at surgeon's request and anesthesia consent obtained.    General Procedure Information  Diagnostic Indications for Echo:  defect repair evaluation and suspected pericardial effusion  Physician Requesting Echo: Kary Munguia MD  Location performed:  OR  Intubated  Bite block placed  Heart visualized  Probe Insertion:  Easy  Probe Type:  Multiplane  Modalities:  2D only, color flow mapping, pulse wave Doppler and continuous wave Doppler    Echocardiographic and Doppler Measurements    Ventricles    Right Ventricle:  Cavity size normal.  Hypertrophy not present.  Thrombus not present.  Global function normal.    Left Ventricle:  Cavity size normal.  Hypertrophy not present.  Thrombus not present.  Global Function normal.      Ventricular Regional Function:  1- Basal Anteroseptal:  normal  2- Basal Anterior:  normal  3- Basal Anterolateral:  normal  4- Basal Inferolateral:  normal  5- Basal Inferior:  normal  6- Basal Inferoseptal:  normal  7- Mid Anteroseptal:  normal  8- Mid Anterior:  normal  9- Mid Anterolateral:  normal  10- Mid Inferolateral:  normal  11- Mid Inferior:  normal  12- Mid Inferoseptal:  normal  13- Apical Anterior:  normal  14- Apical Lateral:  normal  15- Apical Inferior:  normal  16- Apical Septal:  normal  17- [place]:  normal      Valves    Aortic Valve:  Annulus normal.  Regurgitation absent.  Leaflets normal.  Leaflet motions normal.      Mitral Valve:  Annulus normal.  Stenosis not present.  Regurgitation absent.  Leaflets normal.  Leaflet motions normal.      Tricuspid Valve:  Annulus normal.  Stenosis not present.  Regurgitation +1.  Leaflets normal.  Leaflet motions normal.        Aorta    Ascending Aorta:  Size normal.  Dissection not present.   Plaque thickness less than 3 mm.  Mobile plaque not present.    Descending Aorta:  Size normal.  Dissection not present.  Plaque thickness less than 3 mm.  Mobile plaque not present.        Atria    Right Atrium:  Size normal.  Spontaneous echo contrast not present.  Thrombus not present.  Tumor not present.  Device present.    Left Atrium:  Size normal.  Tumor not present.  Device not present.    Left atrial appendage normal.      Septa    Atrial Septum:  Intra-atrial septal morphology normal.      Ventricular Septum:  Intra-ventricular septum morphology normal.          Other Findings  Pericardium:  normal  Pleural Effusion:  none  Pulmonary Arteries:  dilated  Pulmonary Venous Flow:  normal    Anesthesia Information  Performed Personally  Anesthesiologist:  Martínez Ken MD      Post                       Summary: Trivial anterior/lateral pericardial effusion at baseline, no changes when lead taken out.  Complications:None

## 2024-09-27 NOTE — PROCEDURES
DATE OF PROCEDURE:   9/27/2024     ATTENDING PHYSICIAN: Napoleon Dykes MD    Assistant: Kary Munguia MD    PROCEDURE PERFORMED:   1. Implantation of subcutaneous ICD and subcutaneous lead   2. Defibrillation threshold testing of subcutaneous ICD     PREOPERATIVE DIAGNOSES:   1. VF s/p SC-ICD c/b lead fracture  2. H/o PE  3.  Carotid artery dissection    INDICATIONS FOR PROCEDURE:   Palak Berrios is a 54 year old woman with h/o prior VF and single chamber ICD, now with lead fracture.     ANESTHESIA:   General anesthesia by anesthesiology. Local with lidocaine.     PROCEDURE DESCRIPTION:   The risks and benefits of the procedure were explained in detail. The patient understood that risks included, but were not limited to, pain, bleeding, infection, pneumothorax, oversedation requiring intubation, stroke, heart attack, and death. After all questions were answered, the patient provided informed, written consent.     The patient was brought to the EP laboratory in a fasting state, and an EP recording system and external defibrillator were applied. The presenting rhythm was NSR. A subcutaneous system was placed on the chest and brief fluoroscopy used to confirm appropriate positioning with standard anatomic landmarks. The patient was then prepared and draped in the usual sterile fashion.     The left pectoral/flank region was infiltrated with lidocaine, and an diagonal inframammary incision was made ending in the midaxillary line. Using electrocautery and blunt dissection, an ICD device pocket was created posterior/lateral to this incision, in between the serratus and latissimus muscles. A second 2 cm incision was made just leftward of the xiphoid process and carried down to the myofascial plane. A trocar within a tearaway sheath was then passed from the para-xiphoid incision to the lateral inframammary incision and the trocar removed. The subcutaneous ICD lead was passed through the sheath from the medial incision to  the ICD pocket. The sheath was split and removed. The lead was secured to the underlying fascia adjacent to the xiphoid process over its suture sleeve with 0-0 nonabsorbable suture.    Another trocar within a tearaway sheath was used to create a tunnel from the para-xiphoid incision to the superior sternal region, taking care to tunnel immediately over the sternum. The trocar was removed and the sheath flushed. The lead was then advanced through the sheath toward the sternal notch. The sheath was split and removed while pressure was maintained on the lead.     The ICD pocket and both incisions were vigorously irrigated with antibiotic solution, and meticulous hemostasis was ensured with visual inspection and electrocautery. The pulse generator was connected to the lead, and the entire system was placed carefully in the ICD pocket. The pulse generator was secured to the underlying fascia with 0-0 nonabsorbable suture.    The fascial layers of both incisions were closed with 2-0 absorbable suture. After ensuring adequate sedation, defibrillation threshold testing was attempted. VF was induced with 50Hz stimulation through the device. VF was not inducible after 4 attempts. Atrial fibrillation was induced with spontaneous conversion to sinus rhythm. A 10J shock was then delivered through the device for impedance measurement. Shock impedance was 65 Ohms. The xiphoid incision was then closed with 3-0 and 4-0 absorbable suture in the subcutaneous and subcuticular layers. Steri-strips and a sterile dressing were applied. The inframammary incision was closed with 3-0 absorbable suture and Dermabond was applied. A sterile dressing was applied.     The patient tolerated the entire procedure well, with no evidence of any immediate complications, and was transferred to the PACU in stable condition.     COMPLICATIONS:   None     ESTIMATED BLOOD LOSS:   Minimal     IMPLANTED HARDWARE:   Pulse generator: [employer]  MRI Model A219 S-ICD,SN: [id number]  Sternal lead: [employer] EMBLEM S-ICD subcutaneous electrode model 0180, SN: [id number]    SENSING CONFIGURATION:   Primary    MEASURED PARAMETERS:   10J Shock impedance 65 ohms     DEVICE PROGRAMMING:   Shock zone and programmed therapy: 220 bpm = 80J Shock x 5  Conditional shock zone and programmed therapy: 200 bpm = 80J Shock x 5     CONCLUSION:   1. Successful subcutaneous ICD implant   2. 10J shock performed with shock impedance within normal limits    Napoleon Dykes MD  Cardiac Electrophysiology  [facility]

## 2024-09-27 NOTE — PLAN OF CARE
Pt received A/O x 4 with complaints of 10/10 pain. V.S. stable. O2 saturating @ 90's on RA. Lung sound clear; Medication reconciliation completed at bedside. Head to toe and skin assessment completed. IV flushing, Tele placed on patient. Education provided regarding fall precaution and call light use. Belongings and call light within reach. Bed locked and at lowest position. Attending aware of admission, orders and consults received.     Problem: PAIN - ADULT  Goal: Verbalizes/displays adequate comfort level or patient's stated pain goal  Description: INTERVENTIONS:  - Encourage pt to monitor pain and request assistance  - Assess pain using appropriate pain scale  - Administer analgesics based on type and severity of pain and evaluate response  - Implement non-pharmacological measures as appropriate and evaluate response  - Consider cultural and social influences on pain and pain management  - Manage/alleviate anxiety  - Utilize distraction and/or relaxation techniques  - Monitor for opioid side effects  - Notify MD/LIP if interventions unsuccessful or patient reports new pain  - Anticipate increased pain with activity and pre-medicate as appropriate  Outcome: Progressing     Problem: RISK FOR INFECTION - ADULT  Goal: Absence of fever/infection during anticipated neutropenic period  Description: INTERVENTIONS  - Monitor WBC  - Administer growth factors as ordered  - Implement neutropenic guidelines  Outcome: Progressing     Problem: SAFETY ADULT - FALL  Goal: Free from fall injury  Description: INTERVENTIONS:  - Assess pt frequently for physical needs  - Identify cognitive and physical deficits and behaviors that affect risk of falls.  - [place] fall precautions as indicated by assessment.  - Educate pt/family on patient safety including physical limitations  - Instruct pt to call for assistance with activity based on assessment  - Modify environment to reduce risk of injury  - Provide assistive devices as  appropriate  - Consider OT/PT consult to assist with strengthening/mobility  - Encourage toileting schedule  Outcome: Progressing

## 2024-09-27 NOTE — ANESTHESIA PREPROCEDURE EVALUATION
PRE-OP EVALUATION    Patient Name: Palak Berrios    Admit Diagnosis: INTERNAL CARDIAC DEFIBRILLATOR IN PLACE,INTERNAL CARDIAC DEFIBRILLATOR LEAD FRACTURE    Pre-op Diagnosis: INTERNAL CARDIAC DEFIBRILLATOR IN PLACE,INTERNAL CARDIAC DEFIBRILLATOR LEAD FRACTURE    EXTRACTION OF RIGHT VENTRICULAR IMPLANTABLE CARDIOVERTER-DEFIBRILLATOR LEAD AND GENERATOR, IMPLANTATION OF SUBCUTANEOUS IMPLANTABLE CARDIOVERTER DEFIBRILLATOR    Anesthesia Procedure: EXTRACTION OF RIGHT VENTRICULAR IMPLANTABLE CARDIOVERTER-DEFIBRILLATOR LEAD AND GENERATOR, IMPLANTATION OF SUBCUTANEOUS IMPLANTABLE CARDIOVERTER DEFIBRILLATOR    Surgeons and Role:     * Kary Munguia MD - Primary     * Sg Murray MD    Pre-op vitals reviewed.  Temp: 97 °F (36.1 °C)  Pulse: 68  Resp: 12  BP: 140/79  SpO2: 100 %  Body mass index is 22.58 kg/m².    Current medications reviewed.  Hospital Medications:   [START ON 2024] chlorhexidine (Hibiclens) 4 % external liquid   Topical On Call    [START ON 2024] sodium chloride 0.9% infusion   Intravenous On Call    vancomycin (Vancocin) 1,000 mg in sodium chloride 0.9% 250 mL IVPB-ADDV  15 mg/kg Intravenous 30 Min Pre-Op       Outpatient Medications:     Medications Prior to Admission   Medication Sig Dispense Refill Last Dose    butalbital-acetaminophen-caffeine -40 MG Oral Tab 1 tab BID prn 60 tablet 5     Rimegepant Sulfate (NURTEC) 75 MG Oral Tablet Dispersible Take 1 tablet by mouth every other day.   Past Week    [] Lasmiditan Succinate (REYVOW) 100 MG Oral Tab Take 100 mg by mouth once as needed. 8 tablet 0     Multiple Vitamin (MULTIVITAMIN ADULT OR) Take 1 tablet by mouth daily.       Cobalamin Combinations (VITAMIN B12-FOLIC ACID OR) Take 1 tablet by mouth daily.       zonisamide 100 MG Oral Cap Take 1 capsule (100 mg total) by mouth daily. 90 capsule 3 2024    atorvastatin 40 MG Oral Tab Take 1 tablet (40 mg total) by mouth nightly. 90 tablet 3 2024    XARELTO 20 MG  Oral Tab TAKE 1 TABLET BY MOUTH DAILY 90 tablet 3 9/21/2024    amLODIPine Besylate (NORVASC) 5 MG Oral Tab Take 1 tablet (5 mg total) by mouth daily. 90 tablet 3 9/26/2024       Allergies: Clavulanic acid, Augmentin [amoxicillin-pot clavulanate], Cephalexin, and Keflex [cephalexin monohydrate]      Anesthesia Evaluation    Patient summary reviewed.    Anesthetic Complications  (+) history of anesthetic complications  History of: PONV       GI/Hepatic/Renal    Negative GI/hepatic/renal ROS.                             Cardiovascular      ECG reviewed.                 (+) CAD  (+) past MI  (+) CABG/stent  (+) pacemaker/AICD       (+) dysrhythmias   (+) CHF                Endo/Other    Negative endo/other ROS.                              Pulmonary    Negative pulmonary ROS.                       Neuro/Psych    Negative neuro/psych ROS.                          H/o fibrodysplasia, sudden cardiac arrest was during pregnancy, PE was also during pregnancy            Past Surgical History:   Procedure Laterality Date    Angioplasty (coronary)  2005     2.25x x 18mm Vision x 2 stents in LADm     Angioplasty (coronary)  2007    2.5 x 8 mm Vision in LADp    Cardiac pacemaker placement      Cath drug eluting stent      Colonoscopy      Egd      Hysterectomy  07/2021    Other surgical history  8/2005, 1/2009    stents, ICD, mini vision     Other surgical history  08/2005    ICD implantation     Social History     Socioeconomic History    Marital status:     Number of children: 4   Occupational History    Occupation: Dev Director     Employer: [employer]    Occupation: stay at home mom   Tobacco Use    Smoking status: Never     Passive exposure: Never    Smokeless tobacco: Never   Vaping Use    Vaping status: Never Used   Substance and Sexual Activity    Alcohol use: Yes     Alcohol/week: 0.0 standard drinks of alcohol     Comment: socially    Drug use: No   Other Topics Concern    Caffeine Concern Yes     Comment:  1 cup of coffee daily    Exercise Yes     Comment: 5 x a week, walking     History   Drug Use No     Available pre-op labs reviewed.  Lab Results   Component Value Date    WBC 3.5 (L) 09/11/2024    RBC 4.79 09/11/2024    HGB 14.3 09/11/2024    HCT 43.2 09/11/2024    MCV 90.2 09/11/2024    MCH 29.9 09/11/2024    MCHC 33.1 09/11/2024    RDW 13.0 09/11/2024    .0 09/11/2024     Lab Results   Component Value Date     09/11/2024    K 4.5 09/11/2024     09/11/2024    CO2 29.0 09/11/2024    BUN 9 09/11/2024    CREATSERUM 0.81 09/11/2024    GLU 81 09/11/2024    CA 10.2 09/11/2024     Lab Results   Component Value Date    INR 1.15 09/11/2024         Airway      Mallampati: III  Mouth opening: 3 FB  TM distance: 4 - 6 cm   Cardiovascular             Dental  Comment: No loose teeth per patient               Pulmonary                     Other findings              ASA: 3   Plan: general  NPO status verified and     Post-procedure pain management plan discussed with surgeon and patient.    Comment: GETA/LMA discussed in detail.  Risk of complications discussed including but not limited to sore throat, cough, PONV discussed. Also, discussed risks including dental injury particularly on any weakened, treated or diseased teeth & pt wishes to proceed  All questions answered.    Plan/risks discussed with: patient  Use of blood product(s) discussed with: patient    Consented to blood products.          Present on Admission:  **None**

## 2024-09-27 NOTE — PROCEDURES
PROCEDURE PERFORMED:  1.    RV ICD lead extraction and device removal.   2.    Device pocket revision with tissue debridement.  3.    Placement of right femoral venous sheaths, right  femoral arterial line.     ASSISTANT:  Napoleon Dykes M.D.     COMPLICATIONS:  None.     ESTIMATED BLOOD LOSS:  Minimal.     BRIEF CLINICAL HISTORY:       METHODS:  The patient was brought to the hybrid cardiovascular suite  in a fasting and nonsedated state.  Sedation was administered with  the General Anesthesia Service.  A JANET probe was also placed by the  General Anesthesia Service.  The chest was prepped and draped from  the chest to the groin area.     2 femoral venous sheaths were placed in the right groin  via the modified Seldinger technique.  In a similar manner, a  5Fr-Turkish arterial sheath was placed in the right femoral artery.     The device pocket was opened with an incision adjacent to the previous incision. With blunt dissection, the leads were exposed from the capsular adhesions.The lead(s) were then prepped for extraction.      From below, a shockwave balloon was advanced to the brachiocephalic vein, shockwave pulses were delivered over 2 separate applications.  The balloon was gradually withdrawn to the SVC/RA junction with pulses delivered over 2 applications.    A short sheath was then exchanged for a long femoral Cook snare system.  The lead was snared from below and secured to maintain femoral traction a long guidewire was advanced to the SVC for bridge balloon support.  A test inflation was performed.    Using a sub-C tool, extraction from above was initiated.  This was then exchanged for a tight rail mechanical rotation tool.  The lead was safely removed, hemostasis was achieved with manual compression, a pursestring suture was also applied.  The pocket was irrigated and closed.  Steri-Strips were applied.     There was no pericardial effusion visualized by JANET following laser  lead extraction.      CONCLUSIONS:  1.    Status post successful extraction of right ventricular ICD lead, removal of ICD device.  2.     The patient will be prepped and draped for SICD placement.  See separate report.  Kary Munguia MD  09/27/24  9:44 AM

## 2024-09-27 NOTE — ANESTHESIA PROCEDURE NOTES
Arterial Line    Date/Time: 9/27/2024 7:00 AM    Performed by: Martínez Ken MD  Authorized by: Martínez Ken MD    General Information and Staff    Procedure Start:  9/27/2024 7:00 AM  Procedure End:  9/27/2024 7:00 AM  Anesthesiologist:  Martínez Ken MD  Performed By:  Anesthesiologist  Patient Location:  OR  Indication: continuous blood pressure monitoring and blood sampling needed    Site Identification: real time ultrasound guided and image stored and retrievable    Preanesthetic Checklist: 2 patient identifiers, IV checked, risks and benefits discussed, monitors and equipment checked, pre-op evaluation, timeout performed, anesthesia consent and sterile technique used    Procedure Details    Catheter Size:  20 G  Catheter Length:  1 and 3/4 inch  Catheter Type:  Arrow  Seldinger Technique?: Yes    Laterality:  Left  Site:  Radial artery  Site Prep: chlorhexidine    Line Secured:  Wrist Brace, tape and Tegaderm    Assessment    Events: patient tolerated procedure well with no complications      Medications  9/27/2024 7:00 AM      Additional Comments

## 2024-09-27 NOTE — ANESTHESIA PROCEDURE NOTES
Airway  Date/Time: 9/27/2024 6:58 AM  Urgency: elective      General Information and Staff    Patient location during procedure: OR  Anesthesiologist: Martínez Ken MD  Performed: anesthesiologist   Performed by: Martínez Ken MD  Authorized by: Martínez Ken MD      Indications and Patient Condition  Indications for airway management: anesthesia  Sedation level: deep  Preoxygenated: yes  Patient position: sniffing  Mask difficulty assessment: 1 - vent by mask    Final Airway Details  Final airway type: endotracheal airway      Successful airway: ETT  Cuffed: yes   Successful intubation technique: direct laryngoscopy  Endotracheal tube insertion site: oral  Blade: Tato  Blade size: #3  ETT size (mm): 7.0    Cormack-Lehane Classification: grade I - full view of glottis  Placement verified by: capnometry   Cuff volume (mL): 8  Measured from: lips  ETT to lips (cm): 21  Number of attempts at approach: 1    Additional Comments  atraumatic

## 2024-09-28 ENCOUNTER — APPOINTMENT (OUTPATIENT)
Dept: GENERAL RADIOLOGY | Facility: HOSPITAL | Age: 54
DRG: 277 | End: 2024-09-28
Attending: NURSE PRACTITIONER
Payer: COMMERCIAL

## 2024-09-28 ENCOUNTER — APPOINTMENT (OUTPATIENT)
Dept: GENERAL RADIOLOGY | Facility: HOSPITAL | Age: 54
End: 2024-09-28
Attending: NURSE PRACTITIONER
Payer: COMMERCIAL

## 2024-09-28 VITALS
WEIGHT: 142 LBS | RESPIRATION RATE: 18 BRPM | SYSTOLIC BLOOD PRESSURE: 99 MMHG | HEART RATE: 65 BPM | HEIGHT: 66.5 IN | BODY MASS INDEX: 22.55 KG/M2 | OXYGEN SATURATION: 98 % | TEMPERATURE: 98 F | DIASTOLIC BLOOD PRESSURE: 68 MMHG

## 2024-09-28 PROBLEM — L76.82 INCISIONAL PAIN: Status: ACTIVE | Noted: 2024-09-28

## 2024-09-28 PROCEDURE — 71046 X-RAY EXAM CHEST 2 VIEWS: CPT | Performed by: NURSE PRACTITIONER

## 2024-09-28 RX ORDER — TRAMADOL HYDROCHLORIDE 50 MG/1
50 TABLET ORAL EVERY 8 HOURS PRN
Qty: 15 TABLET | Refills: 0 | Status: CANCELLED
Start: 2024-09-28

## 2024-09-28 RX ORDER — HYDROCODONE BITARTRATE AND ACETAMINOPHEN 10; 325 MG/1; MG/1
1 TABLET ORAL EVERY 4 HOURS PRN
Status: DISCONTINUED | OUTPATIENT
Start: 2024-09-28 | End: 2024-09-28

## 2024-09-28 RX ORDER — HYDROCODONE BITARTRATE AND ACETAMINOPHEN 5; 325 MG/1; MG/1
2 TABLET ORAL EVERY 4 HOURS PRN
Status: DISCONTINUED | OUTPATIENT
Start: 2024-09-28 | End: 2024-09-28

## 2024-09-28 RX ORDER — HYDROCODONE BITARTRATE AND ACETAMINOPHEN 10; 325 MG/1; MG/1
1 TABLET ORAL ONCE
Status: COMPLETED | OUTPATIENT
Start: 2024-09-28 | End: 2024-09-28

## 2024-09-28 RX ORDER — HYDROCODONE BITARTRATE AND ACETAMINOPHEN 10; 325 MG/1; MG/1
1 TABLET ORAL EVERY 6 HOURS PRN
Qty: 20 TABLET | Refills: 0 | Status: SHIPPED | OUTPATIENT
Start: 2024-09-28

## 2024-09-28 NOTE — PROGRESS NOTES
Notified by RN, pt c/o pain overnight at PPM pocket site. No improvement in pain with Tramadol. Norco ordered twice overnight.

## 2024-09-28 NOTE — PROGRESS NOTES
[facility] Cardiology Progress Note    Patient seen and examined.  Chart reviewed.       No chest pain or shortness of breath. Pain at ICD pocket site - which is wrapped.    /64 (BP Location: Right arm)   Pulse 64   Temp 98 °F (36.7 °C) (Oral)   Resp 14   Ht 66.5\"   Wt 142 lb   SpO2 98%   BMI 22.58 kg/m²   Gen: alert and oriented  HEENT: moist mucous membranes  Neck: No JVD  CV: regular, s1, s2  Lungs: CTAB  Ext: no edema; L arm in sling  Skin: warm and dry      Intake/Output Summary (Last 24 hours) at 9/28/2024 0661  Last data filed at 9/28/2024 0530  Gross per 24 hour   Intake 360 ml   Output 925 ml   Net -565 ml           [COMPLETED] HYDROcodone-acetaminophen (Norco)  MG per tab 1 tablet  1 tablet Oral Once    [COMPLETED] acetaminophen (Tylenol Extra Strength) tab 1,000 mg  1,000 mg Oral Once PRN    Or    [COMPLETED] HYDROcodone-acetaminophen (Norco) 5-325 MG per tab 1 tablet  1 tablet Oral Once PRN    Or    [COMPLETED] HYDROcodone-acetaminophen (Norco) 5-325 MG per tab 2 tablet  2 tablet Oral Once PRN    amLODIPine (Norvasc) tab 5 mg  5 mg Oral Daily    atorvastatin (Lipitor) tab 40 mg  40 mg Oral Nightly    zonisamide (Zonegran) cap 100 mg  100 mg Oral Daily    [Held by provider] Rimegepant (NURTEC) 75mg ODT - patient supplied  1 tablet Oral QOD    rivaroxaban (Xarelto) tab 10 mg  10 mg Oral Daily with dinner    traMADol (Ultram) tab 50 mg  50 mg Oral Q6H PRN    [COMPLETED] HYDROcodone-acetaminophen (Norco) 5-325 MG per tab 2 tablet  2 tablet Oral Once     Imp/Recs:  CV - S/P ICD lead extraction with Dr. Munguia yesterday. Now with SICD. Kept for observation - doing well. Needs adequate pain control - now receiving Norco.  Continue current cardiac meds. Home today.    Will follow,    Chino Carbajal MD

## 2024-09-28 NOTE — PLAN OF CARE
Acquired patient around 0730. Alert and oriented x4. On ra. SpO2 within normal limits. NSR on tele. Rt groin incision soft; no hematoma. Lt upper chest dressing, lt lower lateral chest dressing, mid sternal dressing, no hematomas present. LUE sling. Ambulated in [place] and performed stairs, tolerated well. PRN norco for pain. Continent of bowel and bladder. Call light within reach. Continue plan of care.     Problem: PAIN - ADULT  Goal: Verbalizes/displays adequate comfort level or patient's stated pain goal  Description: INTERVENTIONS:  - Encourage pt to monitor pain and request assistance  - Assess pain using appropriate pain scale  - Administer analgesics based on type and severity of pain and evaluate response  - Implement non-pharmacological measures as appropriate and evaluate response  - Consider cultural and social influences on pain and pain management  - Manage/alleviate anxiety  - Utilize distraction and/or relaxation techniques  - Monitor for opioid side effects  - Notify MD/LIP if interventions unsuccessful or patient reports new pain  - Anticipate increased pain with activity and pre-medicate as appropriate  Outcome: Progressing     Problem: RISK FOR INFECTION - ADULT  Goal: Absence of fever/infection during anticipated neutropenic period  Description: INTERVENTIONS  - Monitor WBC  - Administer growth factors as ordered  - Implement neutropenic guidelines  Outcome: Progressing     Problem: SAFETY ADULT - FALL  Goal: Free from fall injury  Description: INTERVENTIONS:  - Assess pt frequently for physical needs  - Identify cognitive and physical deficits and behaviors that affect risk of falls.  - [place] fall precautions as indicated by assessment.  - Educate pt/family on patient safety including physical limitations  - Instruct pt to call for assistance with activity based on assessment  - Modify environment to reduce risk of injury  - Provide assistive devices as appropriate  - Consider OT/PT consult  to assist with strengthening/mobility  - Encourage toileting schedule  Outcome: Progressing

## 2024-09-28 NOTE — PLAN OF CARE
[place] for pain. Cold compress as well. Pain not at a goal. Cardiology notified. Up to bathroom. Dressing site to left upper chest, mid chest, left lateral chest intact. No crepitus. Sling to left arm. Vitals stable. SCD in place. Needs met. Kept clean and dry. Right groin intact. No bleeding no hematoma, CMS intact. Palpable pulse to right leg.     Problem: PAIN - ADULT  Goal: Verbalizes/displays adequate comfort level or patient's stated pain goal  Description: INTERVENTIONS:  - Encourage pt to monitor pain and request assistance  - Assess pain using appropriate pain scale  - Administer analgesics based on type and severity of pain and evaluate response  - Implement non-pharmacological measures as appropriate and evaluate response  - Consider cultural and social influences on pain and pain management  - Manage/alleviate anxiety  - Utilize distraction and/or relaxation techniques  - Monitor for opioid side effects  - Notify MD/LIP if interventions unsuccessful or patient reports new pain  - Anticipate increased pain with activity and pre-medicate as appropriate  Outcome: Progressing     Problem: RISK FOR INFECTION - ADULT  Goal: Absence of fever/infection during anticipated neutropenic period  Description: INTERVENTIONS  - Monitor WBC  - Administer growth factors as ordered  - Implement neutropenic guidelines  Outcome: Progressing     Problem: SAFETY ADULT - FALL  Goal: Free from fall injury  Description: INTERVENTIONS:  - Assess pt frequently for physical needs  - Identify cognitive and physical deficits and behaviors that affect risk of falls.  - [place] fall precautions as indicated by assessment.  - Educate pt/family on patient safety including physical limitations  - Instruct pt to call for assistance with activity based on assessment  - Modify environment to reduce risk of injury  - Provide assistive devices as appropriate  - Consider OT/PT consult to assist with strengthening/mobility  - Encourage toileting  schedule  Outcome: Progressing

## 2024-09-28 NOTE — PROGRESS NOTES
Reviewed discharge plan of care. Verbalized understanding of plan. IV removed. Tele removed. Dressing sites remain. Xarelto script printed. Family as discharge transportation.

## 2024-09-29 LAB
BLOOD TYPE BARCODE: 6200
UNIT VOLUME: 350 ML

## 2024-10-01 ENCOUNTER — PATIENT OUTREACH (OUTPATIENT)
Dept: CASE MANAGEMENT | Age: 54
End: 2024-10-01

## 2024-10-01 ENCOUNTER — TELEPHONE (OUTPATIENT)
Dept: FAMILY MEDICINE CLINIC | Facility: CLINIC | Age: 54
End: 2024-10-01

## 2024-10-01 DIAGNOSIS — Z02.9 ENCOUNTERS FOR ADMINISTRATIVE PURPOSE: ICD-10-CM

## 2024-10-01 DIAGNOSIS — L76.82 INCISIONAL PAIN: Primary | ICD-10-CM

## 2024-10-01 NOTE — TELEPHONE ENCOUNTER
S/w patient for TCM. She was discharged on 9/28/24 after pacemaker insertion. She states that she continues to have a lot of pain on her left side when she is getting up and moving stating that pain can go to a 10/10. She states that she almost passed out yesterday when standing up. She got very lightheaded and body almost went limp and she did have some shortness of breath. She states that symptoms have not reoccurred. She continues to have nausea. She did vomit 2 days ago but none since. She currently denies having any shortness of breath and feels fine when she is resting. [facility] discussed ER to be assessed. She stated that she is seeing cardiology on Thursday and is planning on waiting until then. [facility] did transfer patient to Dr. Munguia's office to report symptoms. She denied scheduling an appt with PCP at this time again stating that she is seeing cardiology in 2 days.     TCM book by date 10/12/24

## 2024-10-01 NOTE — PROGRESS NOTES
Transitional Care Management   Discharge Date: 24  Contact Date: 10/1/2024    Assessment:  TCM Initial Assessment    General:  Assessment completed with: Patient  Patient Subjective: Pain is not good. I guess I wasn't prepared for the pain. When I stand up and move it's a 10/10. It's just big and I wasn't prepared for it. The pain is not really in the chest incision it's just the left side. I'm fine when I'm resting and not moving around.  Chief Complaint: incisional pain  Verify patient name and  with patient/ caregiver: Yes    Hospital Stay/Discharge:  Tell me what you understand of why you were in the hospital or emergency department: pacemaker  Prior to leaving the hospital were your Discharge Instructions reviewed with you?: Yes  Did you receive a copy of your written Discharge Instructions?: Yes  What questions do you have about your Discharge Instructions?: none  Do you feel better or worse since you left the hospital or emergency department?: Worse    Follow - Up Appointment:  Do you have a follow-up appointment?: Yes  Date: 10/03/24  Physician: Cardiology  Are there any barriers to getting to your follow-up appointment?: No    Home Health/DME:  Prior to leaving the hospital was Home Health (HH) arranged for you?: No     Prior to leaving the hospital or emergency department was Durable Medical Equipment (DME), medical supplies, or infusions arranged for you?: No  Are DME/medical supply/infusions needs identified by staff during this assessment?: No     Medications/Diet:  Did any of your medications change, during or after your hospital stay or ED visit?: Yes  Do you have your new or updated medications?: Yes  Do you understand what your medications are for and possible side effects?: Yes  Are there any reasons that keep you from taking your medication as prescribed?: No  Any concerns about medication refills?: No    Were you given a different diet per your Discharge Instructions?: No      Questions/Concerns:  Do you have any questions or concerns that have not been discussed?: No         Nursing Interventions: NCM reviewed discharge instructions and when to seek medical attention with the patient. She states that she still has a lot of pain on her left side. She denies that the pain is only in the chest incision. She states that the chest incision is actually okay and that her left side hurts when she is standing up and moving around. She states that it can go up to a 10/10. NC instructed on s/s of infection; she v/u. She states that the area seems to be swollen/big and she did notice some wetness yesterday but wasn't sure if it was sweat as it was clear. She denied that there is any redness or increase in warmth in the area. She states that she does have nausea. She did vomit 2 days ago but has not since. She has not had a BM yet but states that she is passing gas and has not been eating that much. She states that yesterday, when she stood up from a sitting position, she almost passed out and her body almost went fully numb. She states that it has not happened since. NC advised on going to ER to be assessed; she states that she is seeing cardiology on Thursday and those symptoms have not reoccurred. She does not check her bp or heart rate. She currently denies having any fever, sob, lightheadedness, HA or any other new or worsening symptoms. She agrees to go to ER should symptoms worsen. Med review completed. [facility] did transfer pt to Dr. Munguia's office to report symptoms.     Medication Review:   Current Outpatient Medications   Medication Sig Dispense Refill    HYDROcodone-acetaminophen  MG Oral Tab Take 1 tablet by mouth every 6 (six) hours as needed for Pain. 20 tablet 0    rivaroxaban 10 MG Oral Tab Take 1 tablet (10 mg total) by mouth daily with dinner. 30 tablet 3    butalbital-acetaminophen-caffeine -40 MG Oral Tab 1 tab BID prn 60 tablet 5    Rimegepant Sulfate (NURTEC) 75  MG Oral Tablet Dispersible Take 1 tablet by mouth every other day.      Multiple Vitamin (MULTIVITAMIN ADULT OR) Take 1 tablet by mouth daily.      Cobalamin Combinations (VITAMIN B12-FOLIC ACID OR) Take 1 tablet by mouth daily.      zonisamide 100 MG Oral Cap Take 1 capsule (100 mg total) by mouth daily. 90 capsule 3    atorvastatin 40 MG Oral Tab Take 1 tablet (40 mg total) by mouth nightly. 90 tablet 3    amLODIPine Besylate (NORVASC) 5 MG Oral Tab Take 1 tablet (5 mg total) by mouth daily. 90 tablet 3     Did patient review medications using current pill bottles and not just a medication list?  No  Discharge medications reviewed/discussed/and reconciled against outpatient medications with patient.  Any changes or updates to medications sent to primary care provider.  Patient Acknowledged    SDOH:   Transportation Needs: No Transportation Needs (9/27/2024)    Transportation Needs     Lack of Transportation: No     Car Seat: Not on file       Follow-up Appointments:  Your appointments       Date & Time Appointment Department ([place])    Nov 13, 2024 7:30 AM CST Physical - Established with Carmen Carrillo DO [address] ([facility] [address])        Dec 09, 2024 1:00 PM CST Follow Up Visit with Roman Morataya MD [facility] ([facility])        Dec 31, 2024 8:40 AM CST Botox Procedure with Roman Morataya MD [facility] ([facility])              [address]  [address] [place] [address] [address]  [facility] [id number] [phone] [facility]  [id number] [facility] [address]  [facility] [id number] [phone]            Transitional Care Clinic  Was TCC Ordered: No    Primary Care Provider (If no TCC appointment)  Does patient already have  a PCP appointment scheduled? No  Nurse Care Manager Attempted to schedule PCP office TCM appointment with patient   -If no appointment scheduled: Explain -pt declined stating that she is seeing cardiology    Specialist  Does the patient have any other follow-up appointment(s) that need to be scheduled? Yes   -If yes: Nurse Care Manager reviewed upcoming specialist appointments with patient: Yes   -Does the patient need assistance scheduling appointment(s): No, pt seeing cardiology on 10/3/24    CCM referral placed:  No    Book By Date: 10/12/24

## 2024-10-09 ENCOUNTER — OFFICE VISIT (OUTPATIENT)
Dept: NEUROLOGY | Facility: CLINIC | Age: 54
End: 2024-10-09
Payer: COMMERCIAL

## 2024-10-09 VITALS
SYSTOLIC BLOOD PRESSURE: 112 MMHG | WEIGHT: 142 LBS | HEART RATE: 61 BPM | BODY MASS INDEX: 22.55 KG/M2 | DIASTOLIC BLOOD PRESSURE: 64 MMHG | RESPIRATION RATE: 16 BRPM | HEIGHT: 66.5 IN

## 2024-10-09 DIAGNOSIS — G89.29 CHRONIC INTRACTABLE HEADACHE, UNSPECIFIED HEADACHE TYPE: Primary | ICD-10-CM

## 2024-10-09 DIAGNOSIS — R51.9 CHRONIC INTRACTABLE HEADACHE, UNSPECIFIED HEADACHE TYPE: Primary | ICD-10-CM

## 2024-10-09 PROCEDURE — 64615 CHEMODENERV MUSC MIGRAINE: CPT | Performed by: OTHER

## 2024-10-09 RX ORDER — ZONISAMIDE 25 MG/1
CAPSULE ORAL
Qty: 90 CAPSULE | Refills: 5 | Status: SHIPPED | OUTPATIENT
Start: 2024-10-09

## 2024-10-09 NOTE — PROGRESS NOTES
Paperwork noting that patient may bear financial responsibility for procedure(s) performed in clinic today signed prior to proceeding with procedure(s).    Furthermore, patient notified that they should contact their insurer to verify that your procedure/test has been approved and is a COVERED benefit.  Although the [facility] staff does its due diligence, the insurance carrier gives the disclaimer that \"Although the procedure is authorized, this does not guarantee payment.\"    Ultimately the patient is responsible for payment.    Botox is:  [x] Buy and Bill  [] Patient Supplied      Botox Reauthorization Questions:  Has the patient experienced a reduction in frequency of migraines since starting Botox? no  If yes, by what percentage? 0%  Has the intensity of migraines decreased since starting Botox? yes  If yes, by what percentage? 10%    [x] I have discussed with patient that if their insurance changes they must contact the office right away with that information so that a new prior authorization can be completed.  Patient verbalized understanding that Botox cannot be performed without a current prior authorization in place with correct insurance.

## 2024-10-09 NOTE — PROGRESS NOTES
NEUROLOGY  [facility]     10/9/2024  CHRONIC MIGRAINE - BOTOX Injection  CPT: 84164    Palak Berrios is a(n) 54 year old female with chronic migraine.  Patient is here for Botox injection.    HAD HER PACEMAKER replaced with subcutaneous leads  MRI friendly   Old pacemaker and leads removed    ( x ) Headaches are frequent and based on screening procedures, satisfies criteria of chronic migraine:  >15 days a month, each occurrence can be > 4 hours duration.   Note is made that she has tried 2 or more prophylactic treatment in the past and has not responded that is why we are using Botox.      ( x ) Headaches have responded well to previous Botox injection.   Follow up injection necessary because headaches are recurring.  Patient is likewise familiar with the risks. These were reviewed and patient requests to proceed.    ROS:  No additional issues added after completing 10 point ROS      Current Outpatient Medications:     rivaroxaban (XARELTO) 20 MG Oral Tab, Take 1 tablet (20 mg total) by mouth daily with food., Disp: , Rfl:     zonisamide 25 MG Oral Cap, 1 tab AM and 2 tab HS, Disp: 90 capsule, Rfl: 5    butalbital-acetaminophen-caffeine -40 MG Oral Tab, 1 tab BID prn, Disp: 60 tablet, Rfl: 5    Rimegepant Sulfate (NURTEC) 75 MG Oral Tablet Dispersible, Take 1 tablet by mouth every other day., Disp: , Rfl:     Multiple Vitamin (MULTIVITAMIN ADULT OR), Take 1 tablet by mouth daily., Disp: , Rfl:     Cobalamin Combinations (VITAMIN B12-FOLIC ACID OR), Take 1 tablet by mouth daily., Disp: , Rfl:     zonisamide 100 MG Oral Cap, Take 1 capsule (100 mg total) by mouth daily., Disp: 90 capsule, Rfl: 3    atorvastatin 40 MG Oral Tab, Take 1 tablet (40 mg total) by mouth nightly., Disp: 90 tablet, Rfl: 3    amLODIPine Besylate (NORVASC) 5 MG Oral Tab, Take 1 tablet (5 mg total) by mouth daily., Disp: 90 tablet, Rfl: 3      /64 (BP Location: Left arm, Patient Position: Sitting, Cuff Size:  adult)   Pulse 61   Resp 16   Ht 66.5\"   Wt 142 lb (64.4 kg)   BMI 22.58 kg/m²   HENT:  Pink conjunctiva, anicteric sclerae, moist mucosa  Neck: No adenopathy or thyromegaly  Heart S1S2, no murmur  Lungs are clear, no wheezing  Extremities: no cyanosis or edema      PROCEDURE:  Assisted by: CMA or RN in room  BOTOX injection for chronic migraine:  Using alcohol prep, fixed site protocol performed.  Botox was reconstituted to the following concentration:  5 units per 0.1 ml.      Muscles, number of sites, units used and Side injected were as follows:                                                 Units used     Side  Procerus   5 units        center  Corrugators 2 sites  10 units      R/L  Frontalis   4 sites  20 units      R/L  Temporalis   4 sites each side  40 units      R/L  Occipitalis:            3 sites each side             30 units      R/L        Cervical paraspinals   2 sites each side 20 units      R/L  Upper trapezius   3 sites each side 30 units      R/L                                  TOTAL       155 units  Discarded:  45 units        ADDITIONAL ADJUNCTIVE SITES  (   ) Masseters    units  (   ) Levator scapula   units  (   ) Rhomboids      units        IMPRESSION:  1. Chronic intractable headache, unspecified headache type        Post injections instructions:  Expect response to start on the second or going into the 3rd week  Use ice packs and Tylenol ES if with pain at injections sites  Report any signs of infection or inflammation at site of injection  Use migraine medications the same was as before    After procedure check out process by PSRs and rooming RN/MA who were present during the procedure to assist.        Roman Morataya MD  Vascular & General Neurology  [facility]

## 2024-10-09 NOTE — PATIENT INSTRUCTIONS
Refill policies:    Allow 2-3 business days for refills; controlled substances may take longer.  Contact your pharmacy at least 5 days prior to running out of medication and have them send an electronic request or submit request through the “request refill” option in your [employer] account.  Refills are not addressed on weekends; covering physicians do not authorize routine medications on weekends.  No narcotics or controlled substances are refilled after noon on Fridays or by on call physicians.  By law, narcotics must be electronically prescribed.  A 30 day supply with no refills is the maximum allowed.  If your prescription is due for a refill, you may be due for a follow up appointment.  To best provide you care, patients receiving routine medications need to be seen at least once a year.  Patients receiving narcotic/controlled substance medications need to be seen at least once every 3 months.  In the event that your preferred pharmacy does not have the requested medication in stock (e.g. Backordered), it is your responsibility to find another pharmacy that has the requested medication available.  We will gladly send a new prescription to that pharmacy at your request.    Scheduling Tests:    If your physician has ordered radiology tests such as MRI or CT scans, please contact Central Scheduling at [phone] right away to schedule the test.  Once scheduled, the [facility] Centralized Referral Team will work with your insurance carrier to obtain pre-certification or prior authorization.  Depending on your insurance carrier, approval may take 3-10 days.  It is highly recommended patients assure they have received an authorization before having a test performed.  If test is done without insurance authorization, patient may be responsible for the entire amount billed.      Precertification and Prior Authorizations:  If your physician has recommended that you have a procedure or additional testing performed the [facility]  Centralized Referral Team will contact your insurance carrier to obtain pre-certification or prior authorization.    You are strongly encouraged to contact your insurance carrier to verify that your procedure/test has been approved and is a COVERED benefit.  Although the [facility] Centralized Referral Team does its due diligence, the insurance carrier gives the disclaimer that \"Although the procedure is authorized, this does not guarantee payment.\"    Ultimately the patient is responsible for payment.   Thank you for your understanding in this matter.  Paperwork Completion:  If you require FMLA or disability paperwork for your recovery, please make sure to either drop it off or have it faxed to our office at [phone]. Be sure the form has your name and date of birth on it.  The form will be faxed to our Forms Department and they will complete it for you.  There is a 25$ fee for all forms that need to be filled out.  Please be aware there is a 10-14 day turnaround time.  You will need to sign a release of information (ORION) form if your paperwork does not come with one.  You may call the Forms Department with any questions at [phone].  Their fax number is [phone].

## 2024-11-06 ENCOUNTER — TELEPHONE (OUTPATIENT)
Dept: NEUROLOGY | Facility: CLINIC | Age: 54
End: 2024-11-06

## 2024-11-06 DIAGNOSIS — R51.9 CHRONIC INTRACTABLE HEADACHE, UNSPECIFIED HEADACHE TYPE: ICD-10-CM

## 2024-11-06 DIAGNOSIS — G89.29 CHRONIC INTRACTABLE HEADACHE, UNSPECIFIED HEADACHE TYPE: ICD-10-CM

## 2024-11-06 DIAGNOSIS — G43.011 INTRACTABLE MIGRAINE WITHOUT AURA AND WITH STATUS MIGRAINOSUS: ICD-10-CM

## 2024-11-06 RX ORDER — BUTALBITAL, ACETAMINOPHEN AND CAFFEINE 50; 325; 40 MG/1; MG/1; MG/1
TABLET ORAL
Qty: 180 TABLET | Refills: 0 | Status: SHIPPED | OUTPATIENT
Start: 2024-11-06

## 2024-11-06 RX ORDER — RIMEGEPANT SULFATE 75 MG/75MG
TABLET, ORALLY DISINTEGRATING ORAL
Qty: 16 TABLET | Refills: 3 | Status: SHIPPED | OUTPATIENT
Start: 2024-11-06

## 2024-11-06 NOTE — TELEPHONE ENCOUNTER
HA recurring more frequently with the reduction in Zonisamide  Going back of 100 mg daily  Renew Fioricet as she has been taking more    Roman Morataya MD  Vascular & General Neurology  Multiple Sclerosis & related disorders  [facility]  11/6/2024, Time completed 3:54 PM

## 2024-11-11 RX ORDER — ZONISAMIDE 100 MG/1
100 CAPSULE ORAL DAILY
Qty: 90 CAPSULE | Refills: 3 | Status: SHIPPED | OUTPATIENT
Start: 2024-11-11

## 2024-11-11 NOTE — TELEPHONE ENCOUNTER
Medication: ZONISAMIDE 100 MG      Date of last refill: 10/30/23 (#90/3R)  [place]     Last office visit: 10/9/24 Botox. 6/17/24 OV  Due back to clinic per last office note:  per Botox  Date next office visit scheduled:    Future Appointments   Date Time Provider Department Center   11/13/2024  7:30 AM Carmen Carrillo DO [facility] [address]   12/9/2024  1:00 PM Roman Morataya MD [facility]   1/13/2025  9:40 AM Roman Morataya MD [facility]           Last OV note recommendation:    IMPRESSION:  1. Chronic intractable headache, unspecified headache type          Post injections instructions:  Expect response to start on the second or going into the 3rd week  Use ice packs and Tylenol ES if with pain at injections sites  Report any signs of infection or inflammation at site of injection  Use migraine medications the same was as before

## 2024-11-13 ENCOUNTER — OFFICE VISIT (OUTPATIENT)
Dept: FAMILY MEDICINE CLINIC | Facility: CLINIC | Age: 54
End: 2024-11-13
Payer: COMMERCIAL

## 2024-11-13 ENCOUNTER — LAB ENCOUNTER (OUTPATIENT)
Dept: LAB | Age: 54
End: 2024-11-13
Attending: FAMILY MEDICINE
Payer: COMMERCIAL

## 2024-11-13 VITALS
RESPIRATION RATE: 16 BRPM | DIASTOLIC BLOOD PRESSURE: 62 MMHG | HEIGHT: 66.5 IN | SYSTOLIC BLOOD PRESSURE: 102 MMHG | WEIGHT: 144 LBS | OXYGEN SATURATION: 100 % | BODY MASS INDEX: 22.87 KG/M2

## 2024-11-13 DIAGNOSIS — G47.09 OTHER INSOMNIA: ICD-10-CM

## 2024-11-13 DIAGNOSIS — Z23 NEED FOR VACCINATION: ICD-10-CM

## 2024-11-13 DIAGNOSIS — R91.1 LUNG NODULE: ICD-10-CM

## 2024-11-13 DIAGNOSIS — Z00.00 ANNUAL PHYSICAL EXAM: ICD-10-CM

## 2024-11-13 DIAGNOSIS — Z12.31 ENCOUNTER FOR SCREENING MAMMOGRAM FOR HIGH-RISK PATIENT: ICD-10-CM

## 2024-11-13 DIAGNOSIS — Z00.00 ANNUAL PHYSICAL EXAM: Primary | ICD-10-CM

## 2024-11-13 LAB
ALBUMIN SERPL-MCNC: 4.5 G/DL (ref 3.2–4.8)
ALBUMIN/GLOB SERPL: 1.8 {RATIO} (ref 1–2)
ALP LIVER SERPL-CCNC: 103 U/L
ALT SERPL-CCNC: 28 U/L
ANION GAP SERPL CALC-SCNC: 5 MMOL/L (ref 0–18)
AST SERPL-CCNC: 38 U/L (ref ?–34)
BASOPHILS # BLD AUTO: 0.04 X10(3) UL (ref 0–0.2)
BASOPHILS NFR BLD AUTO: 0.8 %
BILIRUB SERPL-MCNC: 0.3 MG/DL (ref 0.3–1.2)
BUN BLD-MCNC: 8 MG/DL (ref 9–23)
CALCIUM BLD-MCNC: 9.3 MG/DL (ref 8.7–10.4)
CHLORIDE SERPL-SCNC: 107 MMOL/L (ref 98–112)
CHOLEST SERPL-MCNC: 184 MG/DL (ref ?–200)
CO2 SERPL-SCNC: 32 MMOL/L (ref 21–32)
CREAT BLD-MCNC: 0.75 MG/DL
EGFRCR SERPLBLD CKD-EPI 2021: 94 ML/MIN/1.73M2 (ref 60–?)
EOSINOPHIL # BLD AUTO: 0.19 X10(3) UL (ref 0–0.7)
EOSINOPHIL NFR BLD AUTO: 3.8 %
ERYTHROCYTE [DISTWIDTH] IN BLOOD BY AUTOMATED COUNT: 13.2 %
EST. AVERAGE GLUCOSE BLD GHB EST-MCNC: 111 MG/DL (ref 68–126)
FASTING PATIENT LIPID ANSWER: YES
FASTING STATUS PATIENT QL REPORTED: YES
GLOBULIN PLAS-MCNC: 2.5 G/DL (ref 2–3.5)
GLUCOSE BLD-MCNC: 82 MG/DL (ref 70–99)
HBA1C MFR BLD: 5.5 % (ref ?–5.7)
HCT VFR BLD AUTO: 42.8 %
HDLC SERPL-MCNC: 68 MG/DL (ref 40–59)
HGB BLD-MCNC: 14 G/DL
IMM GRANULOCYTES # BLD AUTO: 0.03 X10(3) UL (ref 0–1)
IMM GRANULOCYTES NFR BLD: 0.6 %
LDLC SERPL CALC-MCNC: 92 MG/DL (ref ?–100)
LYMPHOCYTES # BLD AUTO: 1.13 X10(3) UL (ref 1–4)
LYMPHOCYTES NFR BLD AUTO: 22.5 %
MCH RBC QN AUTO: 29.9 PG (ref 26–34)
MCHC RBC AUTO-ENTMCNC: 32.7 G/DL (ref 31–37)
MCV RBC AUTO: 91.3 FL
MONOCYTES # BLD AUTO: 0.43 X10(3) UL (ref 0.1–1)
MONOCYTES NFR BLD AUTO: 8.6 %
NEUTROPHILS # BLD AUTO: 3.2 X10 (3) UL (ref 1.5–7.7)
NEUTROPHILS # BLD AUTO: 3.2 X10(3) UL (ref 1.5–7.7)
NEUTROPHILS NFR BLD AUTO: 63.7 %
NONHDLC SERPL-MCNC: 116 MG/DL (ref ?–130)
OSMOLALITY SERPL CALC.SUM OF ELEC: 295 MOSM/KG (ref 275–295)
PLATELET # BLD AUTO: 249 10(3)UL (ref 150–450)
POTASSIUM SERPL-SCNC: 4.3 MMOL/L (ref 3.5–5.1)
PROT SERPL-MCNC: 7 G/DL (ref 5.7–8.2)
RBC # BLD AUTO: 4.69 X10(6)UL
SODIUM SERPL-SCNC: 144 MMOL/L (ref 136–145)
T4 FREE SERPL-MCNC: 1.1 NG/DL (ref 0.8–1.7)
TRIGL SERPL-MCNC: 137 MG/DL (ref 30–149)
TSI SER-ACNC: 0.6 UIU/ML (ref 0.55–4.78)
VIT B12 SERPL-MCNC: 600 PG/ML (ref 211–911)
VIT D+METAB SERPL-MCNC: 27.8 NG/ML (ref 30–100)
VLDLC SERPL CALC-MCNC: 22 MG/DL (ref 0–30)
WBC # BLD AUTO: 5 X10(3) UL (ref 4–11)

## 2024-11-13 PROCEDURE — 80061 LIPID PANEL: CPT

## 2024-11-13 PROCEDURE — 36415 COLL VENOUS BLD VENIPUNCTURE: CPT

## 2024-11-13 PROCEDURE — 99396 PREV VISIT EST AGE 40-64: CPT | Performed by: FAMILY MEDICINE

## 2024-11-13 PROCEDURE — 84443 ASSAY THYROID STIM HORMONE: CPT

## 2024-11-13 PROCEDURE — 90471 IMMUNIZATION ADMIN: CPT | Performed by: FAMILY MEDICINE

## 2024-11-13 PROCEDURE — 83036 HEMOGLOBIN GLYCOSYLATED A1C: CPT

## 2024-11-13 PROCEDURE — 80053 COMPREHEN METABOLIC PANEL: CPT

## 2024-11-13 PROCEDURE — 82306 VITAMIN D 25 HYDROXY: CPT

## 2024-11-13 PROCEDURE — 82607 VITAMIN B-12: CPT

## 2024-11-13 PROCEDURE — 90656 IIV3 VACC NO PRSV 0.5 ML IM: CPT | Performed by: FAMILY MEDICINE

## 2024-11-13 PROCEDURE — 84439 ASSAY OF FREE THYROXINE: CPT

## 2024-11-13 PROCEDURE — 85025 COMPLETE CBC W/AUTO DIFF WBC: CPT

## 2024-11-13 RX ORDER — QUETIAPINE FUMARATE 25 MG/1
25 TABLET, FILM COATED ORAL NIGHTLY
Qty: 30 TABLET | Refills: 0 | Status: SHIPPED | OUTPATIENT
Start: 2024-11-13

## 2024-11-13 NOTE — PROGRESS NOTES
HPI:   Palak Berrios is a 54 year old female who presents for a complete physical exam.     Wt Readings from Last 6 Encounters:   11/13/24 144 lb (65.3 kg)   10/09/24 142 lb (64.4 kg)   09/27/24 142 lb (64.4 kg)   08/08/24 144 lb (65.3 kg)   08/06/24 140 lb (63.5 kg)   07/15/24 140 lb (63.5 kg)     Body mass index is 22.89 kg/m².     Cholesterol, Total (mg/dL)   Date Value   11/14/2023 179   11/12/2019 149   02/08/2016 140     CHOLESTEROL (mg/dL)   Date Value   07/29/2014 152   04/11/2013 154     HDL Cholesterol (mg/dL)   Date Value   11/14/2023 78 (H)   11/12/2019 60 (H)   02/08/2016 53     HDL CHOL (mg/dL)   Date Value   07/29/2014 76   04/11/2013 74     LDL Cholesterol (mg/dL)   Date Value   11/14/2023 78   11/12/2019 77   02/08/2016 71     LDL CHOLESTROL (mg/dL)   Date Value   07/29/2014 59   04/11/2013 67     AST (U/L)   Date Value   08/06/2024 27   11/14/2023 23   11/12/2019 19   07/29/2014 17   09/12/2013 20   06/29/2013 19     ALT (U/L)   Date Value   08/06/2024 13   11/14/2023 24   11/12/2019 20   07/29/2014 16   09/12/2013 19   06/29/2013 22       last pap -- Dr. Dany Pozo Aj- h/o partial hysterectomy    No vaginal discharge  No bladder dysfunctions   No hot flashes or vaginal dryness   + performing self breast exams  last mammogram- due   Dexa - 2024  C-scope - will have soon  calcium and vit D supplementation  No family history of breast colon or ovarian cancer  MGM - colon cancer screen     Pt has extensive cardiac history after the birth of her fourth child.  Pt sees Dr. Munguia- recent defib replacement   Pt had another carotid aneurysm/ dissection    H/o fibromuscular dysplasia specialist.     Dr. Chambers - neuro for migraines     Pt still not sleeping   Not anxious / depressed   No SI nor HI     Seeing [facility]     Current Outpatient Medications   Medication Sig Dispense Refill    ZONISAMIDE 100 MG Oral Cap TAKE 1 CAPSULE BY MOUTH EVERY DAY 90 capsule 3     butalbital-acetaminophen-caffeine -40 MG Oral Tab 1 tab BID prn 180 tablet 0    Rimegepant Sulfate (NURTEC) 75 MG Oral Tablet Dispersible 1 tab every other day 16 tablet 3    rivaroxaban (XARELTO) 20 MG Oral Tab Take 1 tablet (20 mg total) by mouth daily with food.      Multiple Vitamin (MULTIVITAMIN ADULT OR) Take 1 tablet by mouth daily.      Cobalamin Combinations (VITAMIN B12-FOLIC ACID OR) Take 1 tablet by mouth daily.      atorvastatin 40 MG Oral Tab Take 1 tablet (40 mg total) by mouth nightly. 90 tablet 3    amLODIPine Besylate (NORVASC) 5 MG Oral Tab Take 1 tablet (5 mg total) by mouth daily. 90 tablet 3      Past Medical History:    Abdominal pain    AICD (automatic cardioverter/defibrillator) present    Automatic implantable cardiac defibrillator in situ    Blood clot in vein    Cardiac arrest (HCC)    Cardiac defibrillator in place    Cardiomyopathy (HCC)    Resolved.     Chronic headaches    Dissection of coronary artery    DVT (deep venous thrombosis) (HCC)    Edema    H/O arterial dissection    LAD    Hemorrhoids    High cholesterol    Hoarseness, chronic    Hyperlipidemia    Migraines    Other pulmonary embolism and infarction    Postpartum, Aug 2005.     PONV (postoperative nausea and vomiting)    Pulmonary embolism (HCC)    Secondary hypercoagulability disorder (HCC)    Protein S deficiency, W/heterzygous MTHFR mutation.     Spastic colon    Stented coronary artery    Thyromegaly    Tricuspid valve disorder    Vasculitis (HCC)    Involving cerebral vascular bed & R carotid.    Vasospasm (HCC)    During catheter introduction in R coronary artery, Jan 2007.     Ventricular fibrillation (HCC)    Arrest, 2 weeks post delivery of 4th child, Aug 2005.     Wears glasses      Past Surgical History:   Procedure Laterality Date    Angioplasty (coronary)  2005     2.25x x 18mm Vision x 2 stents in LADm     Angioplasty (coronary)  2007    2.5 x 8 mm Vision in LADp    Cardiac pacemaker placement      Cath  drug eluting stent      Colonoscopy      Egd      Hysterectomy  07/2021    Other accessory      new defibralator cord    Other surgical history  8/2005, 1/2009    stents, ICD, mini vision     Other surgical history  08/2005    ICD implantation      Family History   Problem Relation Age of Onset    Diabetes Other         grandmother     Cancer Other         grandmother     Stroke Other         grandmother     Heart Disease Other         grandmother     Hypertension Father     Hypertension Mother     Colon Polyps Mother     Other (VSD & ASD) Other         child born with, repaired at birth     Colon Cancer Maternal Grandmother     Diabetes Maternal Grandmother     Hypertension Maternal Grandmother     Heart Attack Maternal Grandmother     Stroke Maternal Grandmother       Social History:   Social History     Socioeconomic History    Marital status:     Number of children: 4   Occupational History    Occupation: Dev Director     Employer: [employer]    Occupation: stay at home mom   Tobacco Use    Smoking status: Never     Passive exposure: Never    Smokeless tobacco: Never   Vaping Use    Vaping status: Never Used   Substance and Sexual Activity    Alcohol use: Yes     Alcohol/week: 0.0 standard drinks of alcohol     Comment: socially    Drug use: No   Other Topics Concern    Caffeine Concern Yes     Comment: 1 cup of coffee daily    Exercise Yes     Comment: 5 x a week, walking     Social Drivers of Health     Food Insecurity: No Food Insecurity (9/27/2024)    Food Insecurity     Food Insecurity: Never true   Transportation Needs: No Transportation Needs (9/27/2024)    Transportation Needs     Lack of Transportation: No   Housing Stability: Low Risk  (9/27/2024)    Housing Stability     Housing Instability: No     Occ: . : 4. Children:    Works for therapy office // volleyball ref   Exercise:  7 times per week.  Diet: watches calories closely     REVIEW OF SYSTEMS:   GENERAL: feels well  otherwise  SKIN: denies any unusual skin lesions  EYES:denies blurred vision or double vision  HEENT: denies nasal congestion, sinus pain or ST  LUNGS: denies shortness of breath with exertion  CARDIOVASCULAR: denies chest pain on exertion  GI: denies abdominal pain,denies heartburn  MUSCULOSKELETAL: denies back pain  PSYCHE: denies depression or anxiety  HEMATOLOGIC: denies hx of anemia  ENDOCRINE: denies thyroid history  ALL/ASTHMA: denies asthma    EXAM:   /62   Resp 16   Ht 5' 6.5\" (1.689 m)   Wt 144 lb (65.3 kg)   SpO2 100%   BMI 22.89 kg/m²   Body mass index is 22.89 kg/m².   GENERAL: alert and oriented X 3, well developed, well nourished,in no apparent distress  CARDIO: RRR without murmur  LUNGS: clear to auscultation  NECK: supple,no adenopathy,no thyromegaly   HEENT: atraumatic, normocephalic,ears and throat are clear  EYES:PERRLA, EOMI, normal,conjunctiva are clear  SKIN: norashes,no suspicious lesions  GI: good BS's,no masses, HSM or tenderness  CHEST: no chest tenderness  MUSCULOSKELETAL: back is not tender,FROM of the back  EXTREMITIES: no cyanosis, clubbing or edema  NEURO: cranial nerves are intact,motor and sensory are intact    ASSESSMENT AND PLAN:   Palak Berrios is a 54 year old female who presents for annual physical.    1. Annual physical exam    - Vitamin D [E]; Future  - Free T4, (Free Thyroxine); Future  - Assay, Thyroid Stim Hormone; Future  - Lipid Panel; Future  - CBC With Differential With Platelet; Future  - Vitamin B12; Future  - Comp Metabolic Panel (14); Future  - Hemoglobin A1C; Future    2. Encounter for screening mammogram for high-risk patient    - [facility] BAN 2D+3D SCREENING BILAT (CPT=77067/58208); Future    3. Other insomnia  F/u sooner if meds not working     - QUEtiapine (SEROQUEL) 25 MG Oral Tab; Take 1 tablet (25 mg total) by mouth nightly.  Dispense: 30 tablet; Refill: 0    4. Lung nodule    - z Insight CT CHEST (CPT=71250); Future  - z Insight CT CHEST  (CPT=71250)    5. Need for vaccination    - INFLUENZA VACCINE, TRI, PRESERV FREE, 0.5 ML      Discussed diet, exercise,calcium, vitamin D, fish oil and self breast exams.   Questions answered and patient indicates understanding of these issues and agrees to the plan.  Follow up in 1 year or sooner if needed.

## 2024-11-26 ENCOUNTER — TELEPHONE (OUTPATIENT)
Dept: FAMILY MEDICINE CLINIC | Facility: CLINIC | Age: 54
End: 2024-11-26

## 2024-11-26 DIAGNOSIS — R91.8 PULMONARY NODULES: Primary | ICD-10-CM

## 2024-11-26 NOTE — TELEPHONE ENCOUNTER
Pt requesting a name of a pulmonologist   She made an apt with Dr. Robertson but can not get in until 01/08/25  Please advise

## 2024-11-27 ENCOUNTER — TELEPHONE (OUTPATIENT)
Facility: CLINIC | Age: 54
End: 2024-11-27

## 2024-11-27 NOTE — TELEPHONE ENCOUNTER
Referral from Dr. Carrillo for Lung Nodule clinic.  Had CT chest on 11-20-24 with findings:  Multiple bilateral noncalcified lung nodules measuring up to 4 mm. For a low risk individual, no routine followup is recommended. For a high risk individual, an optional 12 month followup CT should be considered.   Appointment scheduled for 1-8-25 but pt would like a sooner appointment.    Per Jordyn PSR, pt scheduled with Dr. Galvez on 12-3-24.

## 2024-12-03 ENCOUNTER — OFFICE VISIT (OUTPATIENT)
Age: 54
End: 2024-12-03
Payer: COMMERCIAL

## 2024-12-03 VITALS
SYSTOLIC BLOOD PRESSURE: 116 MMHG | WEIGHT: 145 LBS | RESPIRATION RATE: 16 BRPM | OXYGEN SATURATION: 98 % | DIASTOLIC BLOOD PRESSURE: 70 MMHG | HEIGHT: 66.5 IN | HEART RATE: 69 BPM | BODY MASS INDEX: 23.03 KG/M2

## 2024-12-03 DIAGNOSIS — R06.02 SHORTNESS OF BREATH: ICD-10-CM

## 2024-12-03 DIAGNOSIS — I27.82 CHRONIC PULMONARY EMBOLISM, UNSPECIFIED PULMONARY EMBOLISM TYPE, UNSPECIFIED WHETHER ACUTE COR PULMONALE PRESENT (HCC): Primary | ICD-10-CM

## 2024-12-03 PROCEDURE — 99204 OFFICE O/P NEW MOD 45 MIN: CPT | Performed by: INTERNAL MEDICINE

## 2024-12-03 NOTE — PROGRESS NOTES
[facility] General Pulmonary Consult Note    Chief Complaint:  Chief Complaint   Patient presents with    New Patient     Pt c/o chest tightness and dyspnea on exertion x4 months  CT 11/20       History of Present Illness:  Palak Berrios is a 54 year old female who presents today for evaluation of chest tightness.  Feels like something is sitting on her chest - had extensive coronary disease treatment including AICD placement, but after cardiac optimization still getting significant dyspnea on exertion.  Denies any fevers, chills, cough, etc ,mostly just dyspnea on exertion.  Patient is a never smoker.  Patient had CT last month, she didhave PE noted about 4 months ago.      Past Medical History:   Past Medical History:    Abdominal pain    AICD (automatic cardioverter/defibrillator) present    ALCOHOL USE    Allergic rhinitis    Atherosclerosis of coronary artery    Not sure if considered CAD    Automatic implantable cardiac defibrillator in situ    Blood clot in vein    Cardiac arrest (HCC)    Cardiac defibrillator in place    Cardiomyopathy (HCC)    Resolved.     Chronic headaches    Dissection of coronary artery    DVT (deep venous thrombosis) (HCC)    Edema    H/O arterial dissection    LAD    Hemorrhoids    High cholesterol    History of blood clots    Have had several ( lungs , legs, brachial vein)    Hoarseness, chronic    Hyperlipidemia    Migraines    Myocardial infarction (HCC)    at time of LAD dissection    Other pulmonary embolism and infarction    Postpartum, Aug 2005.     PONV (postoperative nausea and vomiting)    Pulmonary embolism (HCC)    Secondary hypercoagulability disorder (HCC)    Protein S deficiency, W/heterzygous MTHFR mutation.     Spastic colon    Stented coronary artery    Thyromegaly    Tricuspid valve disorder    Vasculitis (HCC)    Involving cerebral vascular bed & R carotid.    Vasospasm (HCC)    During catheter introduction in R coronary artery, Jan 2007.     Ventricular fibrillation  (HCC)    Arrest, 2 weeks post delivery of 4th child, Aug 2005.     Wears glasses        Past Surgical History:   Past Surgical History:   Procedure Laterality Date    Angioplasty (coronary)       2.25x x 18mm Vision x 2 stents in LADm     Angioplasty (coronary)      2.5 x 8 mm Vision in LADp    Cardiac pacemaker placement      Cath drug eluting stent      Colonoscopy      D & c      Non viable pregnancy    Egd      Hysterectomy  2021          Other accessory      new defibralator cord    Other surgical history  2005, 2009    stents, ICD, mini vision     Other surgical history  2005    ICD implantation       Family Medical History:   Family History   Problem Relation Age of Onset    Diabetes Other         grandmother     Cancer Other         grandmother     Stroke Other         grandmother     Heart Disease Other         grandmother     Hypertension Father     Dementia Father          of Alzheimer’s    Hypertension Mother     Colon Polyps Mother     Other (VSD & ASD) Other         child born with, repaired at birth     Colon Cancer Maternal Grandmother     Diabetes Maternal Grandmother     Hypertension Maternal Grandmother     Heart Attack Maternal Grandmother     Stroke Maternal Grandmother     Cancer Maternal Grandmother         Social History:   Social History     Socioeconomic History    Marital status:      Spouse name: Not on file    Number of children: 4    Years of education: Not on file    Highest education level: Not on file   Occupational History    Occupation: Dev Director     Employer: [employer]    Occupation: stay at home mom   Tobacco Use    Smoking status: Never     Passive exposure: Never    Smokeless tobacco: Never    Tobacco comments:     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   Vaping Use    Vaping status: Never Used   Substance and Sexual Activity    Alcohol use: Yes     Comment: one or two glasses a month    Drug use: No    Sexual  activity: Not on file   Other Topics Concern     Service Not Asked    Blood Transfusions Not Asked    Caffeine Concern Yes     Comment: 1 cup of coffee daily    Occupational Exposure Not Asked    Hobby Hazards Not Asked    Sleep Concern Not Asked    Stress Concern Not Asked    Weight Concern Not Asked    Special Diet Not Asked    Back Care Not Asked    Exercise Yes     Comment: 5 x a week, walking    Bike Helmet Not Asked    Seat Belt Not Asked    Self-Exams Not Asked   Social History Narrative    Not on file     Social Drivers of Health     Financial Resource Strain: Not on file   Food Insecurity: No Food Insecurity (9/27/2024)    Food Insecurity     Food Insecurity: Never true   Transportation Needs: No Transportation Needs (9/27/2024)    Transportation Needs     Lack of Transportation: No     Car Seat: Not on file   Physical Activity: Not on file   Stress: Not on file   Social Connections: Not on file   Housing Stability: Low Risk  (9/27/2024)    Housing Stability     Housing Instability: No     Housing Instability Emergency: Not on file     Crib or Bassinette: Not on file        Allergies: Clavulanic acid, Augmentin [amoxicillin-pot clavulanate], Cephalexin, and Keflex [cephalexin monohydrate]     Medications:   Current Outpatient Medications   Medication Sig Dispense Refill    QUEtiapine (SEROQUEL) 25 MG Oral Tab Take 1 tablet (25 mg total) by mouth nightly. 30 tablet 0    ZONISAMIDE 100 MG Oral Cap TAKE 1 CAPSULE BY MOUTH EVERY DAY 90 capsule 3    butalbital-acetaminophen-caffeine -40 MG Oral Tab 1 tab BID prn 180 tablet 0    Rimegepant Sulfate (NURTEC) 75 MG Oral Tablet Dispersible 1 tab every other day 16 tablet 3    rivaroxaban (XARELTO) 20 MG Oral Tab Take 1 tablet (20 mg total) by mouth daily with food.      Multiple Vitamin (MULTIVITAMIN ADULT OR) Take 1 tablet by mouth daily.      Cobalamin Combinations (VITAMIN B12-FOLIC ACID OR) Take 1 tablet by mouth daily.      atorvastatin 40 MG Oral  Tab Take 1 tablet (40 mg total) by mouth nightly. 90 tablet 3    amLODIPine Besylate (NORVASC) 5 MG Oral Tab Take 1 tablet (5 mg total) by mouth daily. 90 tablet 3       Review of Systems: Review of Systems    Physical Exam:  /70 (BP Location: Right arm, Patient Position: Sitting, Cuff Size: adult)   Pulse 69   Resp 16   Ht 5' 6.5\" (1.689 m)   Wt 145 lb (65.8 kg)   SpO2 98%   BMI 23.05 kg/m²      Constitutional: alert, cooperative. No acute distress.  HEENT: Head NC/AT. Nares normal. Septum midline. Mucosa normal. No drainage or sinus tenderness.. Mallampati 1+  Cardio: Regular rate and rhythm. Normal S1 and S2. No murmurs.   Respiratory: Thorax symmetrical with no labored breathing. clear to auscultation bilaterally  GI: NABS. Abd soft, non-tender.  Extremities: No clubbing or cyanosis. No BLE edema.    Neurologic: A&Ox3. No gross motor deficits.  Skin: Warm, dry  Psych: Calm, cooperative. Pleasant affect.    Results:  Personally reviewed  WBC: 5, done on 11/13/2024.  HGB: 14, done on 11/13/2024.  PLT: 249, done on 11/13/2024.     Glucose: 82, done on 11/13/2024.  Cr: 0.75, done on 11/13/2024.  Last eGFR was 94 on 11/13/2024.  CA: 9.3, done on 11/13/2024.  Na: 144, done on 11/13/2024.  K: 4.3, done on 11/13/2024.  Cl: 107, done on 11/13/2024.  CO2: 32, done on 11/13/2024.  Last ALB was 4.5% done on 11/13/2024.     XR CHEST PA + LAT CHEST (CPT=71046)    Result Date: 9/28/2024  CONCLUSION:  Subcutaneous ICD with battery pack in the left and lead overlying the midline chest.   LOCATION:  [facility]   Dictated by (CST): Charles King MD on 9/28/2024 at 10:17 AM     Finalized by (CST): Charles King MD on 9/28/2024 at 10:19 AM       XR CHEST AP PORTABLE  (CPT=71045)    Result Date: 9/27/2024  CONCLUSION:  No acute cardiopulmonary disease.  Interval removal of cardiac defibrillator.  Interval placement of neurostimulator device.  Please see further details above.   LOCATION:  [facility]      Dictated by (CST): Damion  SusanDO arabella on 9/27/2024 at 4:21 PM     Finalized by (CST): Susan Brunoikmary joDO on 9/27/2024 at 4:24 PM       XR DEXA BONE DENSITOMETRY (CPT=77080)    Result Date: 9/18/2024  CONCLUSION:  Bone mineral density values for left femoral neck are compatible with the diagnosis of osteopenia by WHO definition (T score between -1.0 and -2.5).  If therapy is initiated, follow-up study is recommended in 2 years for further evaluation of therapeutic efficacy.   Recommendation:  The National Osteoporosis Foundation (NOF) recommends pharmacological treatment for patients with a FRAX 10-year risk score of 3% or higher for a hip fracture or 20% or higher for a major osteoporotic fracture, to prevent osteoporosis and reduce fracture risk.  The World Health Organization has defined the following categories based on bone density: Normal bone:  T-score greater than or equal to -1 Osteopenia: T-score  less than -1 and greater  than -2.5 Osteoporosis:  T-score less than or equal -2.5   LOCATION:  [facility]     Dictated by (CST): Constance Burks MD on 9/18/2024 at 11:09 AM     Finalized by (CST): Constance Burks MD on 9/18/2024 at 11:10 AM       XR CHEST PA + LAT CHEST (CPT=71046)    Result Date: 9/11/2024  CONCLUSION:  1. Hyperexpansion of the lungs. 2. No acute pulmonary findings.    LOCATION:  [facility]   Dictated by (CST): Constance Burks MD on 9/11/2024 at 11:19 AM     Finalized by (CST): Constance Burks MD on 9/11/2024 at 11:19 AM         Assessment/Plan:  #1. Shortness of breath  Given her recent history of PE, would recommend VQ scan    #2. Multiple 2-4 mm lung nodules  We reviewed CT scan in detail per guidelines, no follow up is recommended given that she is low risk  I offered 1 time follow up CT in a year but she declined, which is reasonable    No follow-ups on file.    Deion Galvez MD  12/3/2024

## 2024-12-09 ENCOUNTER — OFFICE VISIT (OUTPATIENT)
Dept: NEUROLOGY | Facility: CLINIC | Age: 54
End: 2024-12-09
Payer: COMMERCIAL

## 2024-12-09 ENCOUNTER — HOSPITAL ENCOUNTER (OUTPATIENT)
Dept: GENERAL RADIOLOGY | Facility: HOSPITAL | Age: 54
Discharge: HOME | End: 2024-12-09
Attending: INTERNAL MEDICINE
Payer: COMMERCIAL

## 2024-12-09 ENCOUNTER — HOSPITAL ENCOUNTER (OUTPATIENT)
Dept: NUCLEAR MEDICINE | Facility: HOSPITAL | Age: 54
Discharge: HOME | End: 2024-12-09
Attending: INTERNAL MEDICINE
Payer: COMMERCIAL

## 2024-12-09 VITALS
HEIGHT: 66.5 IN | WEIGHT: 145 LBS | SYSTOLIC BLOOD PRESSURE: 108 MMHG | DIASTOLIC BLOOD PRESSURE: 66 MMHG | RESPIRATION RATE: 16 BRPM | HEART RATE: 57 BPM | BODY MASS INDEX: 23.03 KG/M2

## 2024-12-09 DIAGNOSIS — R06.02 SHORTNESS OF BREATH: ICD-10-CM

## 2024-12-09 DIAGNOSIS — I77.3 FIBROMUSCULAR DYSPLASIA (HCC): ICD-10-CM

## 2024-12-09 DIAGNOSIS — I77.71 CAROTID ARTERY DISSECTION (HCC): ICD-10-CM

## 2024-12-09 DIAGNOSIS — G43.011 INTRACTABLE MIGRAINE WITHOUT AURA AND WITH STATUS MIGRAINOSUS: Primary | ICD-10-CM

## 2024-12-09 DIAGNOSIS — I27.82 CHRONIC PULMONARY EMBOLISM, UNSPECIFIED PULMONARY EMBOLISM TYPE, UNSPECIFIED WHETHER ACUTE COR PULMONALE PRESENT (HCC): ICD-10-CM

## 2024-12-09 DIAGNOSIS — R06.02 SOB (SHORTNESS OF BREATH): ICD-10-CM

## 2024-12-09 PROCEDURE — 71046 X-RAY EXAM CHEST 2 VIEWS: CPT | Performed by: INTERNAL MEDICINE

## 2024-12-09 PROCEDURE — 78582 LUNG VENTILAT&PERFUS IMAGING: CPT | Performed by: INTERNAL MEDICINE

## 2024-12-09 PROCEDURE — 99214 OFFICE O/P EST MOD 30 MIN: CPT | Performed by: OTHER

## 2024-12-09 NOTE — PROGRESS NOTES
NEUROLOGY  [facility]       Palak Whitejamarcus  2/17/1970  Primary Care Provider:  Carmen Carrillo DO    12/9/2024  54 year old yo,  was last seen on:: October 2024    Seen for/plans last visit:  Chronic migraine  Dissection carotid and Coronary arteries    Previous visit and existing record notes reviewed in preparation for the face to face visit.  Relevant labs and studies reviewed and will be noted in relevant areas of this record.  Accompanied visit:     (x) No.      Present condition:  She tried switching zonisamide and the headaches came back with vengeance   Now that she is back taking them she is on her baseline    1-2 days a week where HA can be manageable with 1 fioricet   Rarely takes 2   Other days, does not take medications    Past History update/new problem(s): as above    Review of Systems:  Review of Systems:  Denies systemic symptoms     No CP or SOB.  No GI or  symptoms. Relevant Neuro as noted above.      Medications:      Current Outpatient Medications:     Cholecalciferol 125 MCG (5000 UT) Oral Tab, Take 1 tablet (5,000 Units total) by mouth daily., Disp: , Rfl:     ZONISAMIDE 100 MG Oral Cap, TAKE 1 CAPSULE BY MOUTH EVERY DAY, Disp: 90 capsule, Rfl: 3    butalbital-acetaminophen-caffeine -40 MG Oral Tab, 1 tab BID prn, Disp: 180 tablet, Rfl: 0    Rimegepant Sulfate (NURTEC) 75 MG Oral Tablet Dispersible, 1 tab every other day, Disp: 16 tablet, Rfl: 3    rivaroxaban (XARELTO) 20 MG Oral Tab, Take 1 tablet (20 mg total) by mouth daily with food., Disp: , Rfl:     Multiple Vitamin (MULTIVITAMIN ADULT OR), Take 1 tablet by mouth daily., Disp: , Rfl:     Cobalamin Combinations (VITAMIN B12-FOLIC ACID OR), Take 1 tablet by mouth daily., Disp: , Rfl:     atorvastatin 40 MG Oral Tab, Take 1 tablet (40 mg total) by mouth nightly., Disp: 90 tablet, Rfl: 3    amLODIPine Besylate (NORVASC) 5 MG Oral Tab, Take 1 tablet (5 mg total) by mouth daily., Disp: 90 tablet, Rfl: 3  PRN:      Allergies:  Allergies[1]       EXAM:  /66 (BP Location: Left arm, Patient Position: Sitting, Cuff Size: adult)   Pulse 57   Resp 16   Ht 66.5\"   Wt 145 lb (65.8 kg)   BMI 23.05 kg/m²   Looks stated age  General Exam:  HENT:  pink conjunctiva anicteric sclerae  Neck no adenopathy, thyroid normal  Heart and Lungs:  normal  Extremities: no cyanosis, skin changes    NEURO  NEURO  Able to relate events with fluent speech and intact comprehension  CN 2-12: pupils reactive, VF full face symmetric sensation and movement tongue midline  No motor focal findings  Sensory: no lateralizing findings  Reflexes are symmetric  UMN signs: none  Gait: narrow based          INTERPRETATION of RELEVANT LABS and other DATA:          Problem/s Identified this visit:   1. Intractable migraine without aura and with status migrainosus    2. Carotid artery dissection (HCC)    3. Fibromuscular dysplasia (HCC)          Discussion plus Diagnostics & Treatment Orders:  After initial exploration and possibly getting her off zonisamide leading to increased headaches and now more controlled that she is back on the same doses, it is better that we keep her in the same medication for now indefinitely.    Renew necessary medications      (X) Discussed potential side effects of any treatment relevant to above.  Includes explanation of tests as necessary.    Return in about 6 months (around 6/9/2025).      Patient understands that if needed, based on condition and or test results, follow up will be readjusted      Roman Morataya MD  Vascular & General Neurology  Director, Multiple Sclerosis Program  [facility]  12/9/2024, Time completed 1:12 PM    Decision making:  ( x ) labs reviewed/ordered - 1  (  ) new diagnosis: - 1  ( x) Images & studies independently reviewed -non F2F  (  ) Case/studies discussed with other caregivers - -non F2F  (  ) Telephone time with patiern or authorized Fam member--non F2F  ( x ) other  records reviewed --non F2F including consultations  (  ) Fam meetings - patient not present --non F2F  (  ) Independent Historian obtained    Non Face to Face CPT code 27744/95683 applies as documented above    PROCEDURE DONE     (   ) see notes        After visit, patient was escorted out and handed-off discharge process and instructions to the check out desk.  No additional issues relevant to visit were raised to staff at this time interval.        This document is to be interpreted as my current opinion regarding the case as of the stated date of service based on the information available to me at this time and may supersedes any prior opinion expressed either orally or in writing.  Services rendered are only within the scope of direct medical care  Sometimes, reports may have been prepared partially using a speech recognition software technology.  If a word or phrase is confusing or out of context, please do not hesitate to call for clarification.              [1]   Allergies  Allergen Reactions    Clavulanic Acid HIVES    Augmentin [Amoxicillin-Pot Clavulanate] HIVES    Cephalexin OTHER (SEE COMMENTS)     Oral, hives    Keflex [Cephalexin Monohydrate] HIVES

## 2024-12-09 NOTE — PATIENT INSTRUCTIONS
Refill policies:    Allow 2-3 business days for refills; controlled substances may take longer.  Contact your pharmacy at least 5 days prior to running out of medication and have them send an electronic request or submit request through the “request refill” option in your [employer] account.  Refills are not addressed on weekends; covering physicians do not authorize routine medications on weekends.  No narcotics or controlled substances are refilled after noon on Fridays or by on call physicians.  By law, narcotics must be electronically prescribed.  A 30 day supply with no refills is the maximum allowed.  If your prescription is due for a refill, you may be due for a follow up appointment.  To best provide you care, patients receiving routine medications need to be seen at least once a year.  Patients receiving narcotic/controlled substance medications need to be seen at least once every 3 months.  In the event that your preferred pharmacy does not have the requested medication in stock (e.g. Backordered), it is your responsibility to find another pharmacy that has the requested medication available.  We will gladly send a new prescription to that pharmacy at your request.    Scheduling Tests:    If your physician has ordered radiology tests such as MRI or CT scans, please contact Central Scheduling at [phone] right away to schedule the test.  Once scheduled, the [facility] Centralized Referral Team will work with your insurance carrier to obtain pre-certification or prior authorization.  Depending on your insurance carrier, approval may take 3-10 days.  It is highly recommended patients assure they have received an authorization before having a test performed.  If test is done without insurance authorization, patient may be responsible for the entire amount billed.      Precertification and Prior Authorizations:  If your physician has recommended that you have a procedure or additional testing performed the [facility]  Centralized Referral Team will contact your insurance carrier to obtain pre-certification or prior authorization.    You are strongly encouraged to contact your insurance carrier to verify that your procedure/test has been approved and is a COVERED benefit.  Although the [facility] Centralized Referral Team does its due diligence, the insurance carrier gives the disclaimer that \"Although the procedure is authorized, this does not guarantee payment.\"    Ultimately the patient is responsible for payment.   Thank you for your understanding in this matter.  Paperwork Completion:  If you require FMLA or disability paperwork for your recovery, please make sure to either drop it off or have it faxed to our office at [phone]. Be sure the form has your name and date of birth on it.  The form will be faxed to our Forms Department and they will complete it for you.  There is a 25$ fee for all forms that need to be filled out.  Please be aware there is a 10-14 day turnaround time.  You will need to sign a release of information (ORION) form if your paperwork does not come with one.  You may call the Forms Department with any questions at [phone].  Their fax number is [phone].

## 2024-12-17 ENCOUNTER — HOSPITAL ENCOUNTER (OUTPATIENT)
Dept: MAMMOGRAPHY | Age: 54
Discharge: HOME | End: 2024-12-17
Attending: FAMILY MEDICINE
Payer: COMMERCIAL

## 2024-12-17 DIAGNOSIS — Z12.31 ENCOUNTER FOR SCREENING MAMMOGRAM FOR HIGH-RISK PATIENT: ICD-10-CM

## 2024-12-17 PROCEDURE — 77067 SCR MAMMO BI INCL CAD: CPT | Performed by: FAMILY MEDICINE

## 2024-12-17 PROCEDURE — 77063 BREAST TOMOSYNTHESIS BI: CPT | Performed by: FAMILY MEDICINE

## 2025-01-02 ENCOUNTER — TELEPHONE (OUTPATIENT)
Dept: NEUROLOGY | Facility: CLINIC | Age: 55
End: 2025-01-02

## 2025-01-02 NOTE — TELEPHONE ENCOUNTER
PA requested for [facility]  Clinical questions answered and submitted to insurance  Awaiting coverage determination

## 2025-01-14 NOTE — TELEPHONE ENCOUNTER
Spoke w/ pt, relayed new pharmacy prior authorization insurance number  Submitted to [facility], holding for response   Subjective   Patient ID: John is a 57 year old male.    Chief Complaint   Patient presents with   • Generalized Body Aches     head, neck and shoulder pain, calve pain, left glut pain. right arm sore. x \"couple of weeks\" hx: RA     Multiple complaints. + headache and neck and back pain. No fevers no uri symptoms.     Headache    This is a chronic problem. The problem occurs intermittently. The pain radiates to the upper back. Associated symptoms include back pain and neck pain. Pertinent negatives include no abdominal pain, coughing, dizziness, facial sweating, fever, hearing loss, insomnia, nausea, numbness, phonophobia, scalp tenderness, sinus pressure, sore throat, tingling or vomiting. Nothing aggravates the symptoms. He has tried nothing for the symptoms.         No past medical history on file.    MEDICATIONS:  No current outpatient medications on file.     No current facility-administered medications for this visit.        ALLERGIES:  ALLERGIES:  Allergies no known allergies    PAST SURGICAL HISTORY:  No past surgical history on file.    FAMILY HISTORY:  No family history on file.    SOCIAL HISTORY:  Social History     Tobacco Use   • Smoking status: Not on file   Substance Use Topics   • Alcohol use: Not on file   • Drug use: Not on file         Patient's medications, allergies, past medical, surgical, and social history  were reviewed and updated as appropriate.    Review of Systems   Constitutional: Negative for chills, fatigue and fever.   HENT: Negative for congestion, hearing loss, sinus pressure, sore throat and voice change.    Respiratory: Negative for cough and wheezing.    Cardiovascular: Negative for chest pain, palpitations and leg swelling.   Gastrointestinal: Negative for abdominal pain, nausea and vomiting.   Genitourinary: Negative for dysuria, frequency and hematuria.   Musculoskeletal: Positive for back pain and neck pain. Negative for joint swelling and myalgias.   Skin: Negative for  pallor and rash.   Allergic/Immunologic: Negative for environmental allergies and food allergies.   Neurological: Positive for headaches. Negative for dizziness, tingling and numbness.   Hematological: Negative for adenopathy.   Psychiatric/Behavioral: The patient does not have insomnia.    All other systems reviewed and are negative.      Objective   Physical Exam  Vitals signs and nursing note reviewed.   Constitutional:       General: He is not in acute distress.     Appearance: Normal appearance. He is not ill-appearing.      Comments: Elevated BP   HENT:      Head: Normocephalic and atraumatic.      Nose: Nose normal.      Mouth/Throat:      Mouth: Mucous membranes are moist.   Eyes:      Extraocular Movements: Extraocular movements intact.      Conjunctiva/sclera: Conjunctivae normal.      Pupils: Pupils are equal, round, and reactive to light.   Neck:      Musculoskeletal: Normal range of motion and neck supple.   Cardiovascular:      Rate and Rhythm: Normal rate and regular rhythm.   Pulmonary:      Effort: Pulmonary effort is normal. No respiratory distress.      Breath sounds: Normal breath sounds.   Abdominal:      General: Abdomen is flat. There is no distension.      Palpations: Abdomen is soft.      Tenderness: There is no tenderness.   Musculoskeletal: Normal range of motion.         General: No swelling.   Lymphadenopathy:      Cervical: No cervical adenopathy.   Skin:     General: Skin is warm and dry.      Capillary Refill: Capillary refill takes less than 2 seconds.   Neurological:      General: No focal deficit present.      Mental Status: He is alert and oriented to person, place, and time.      Cranial Nerves: No cranial nerve deficit.      Sensory: No sensory deficit.   Psychiatric:         Mood and Affect: Mood normal.       Visit Vitals  BP (!) 170/107 (BP Location: RUE - Right upper extremity)   Pulse 65   Temp 97.8 °F (36.6 °C)   Resp 16   Wt 87 kg (191 lb 12.8 oz)   BMI 26.01 kg/m²        Assessment   Problem List Items Addressed This Visit     None      Visit Diagnoses     Elevated blood-pressure reading without diagnosis of hypertension    -  Primary    Nonintractable headache, unspecified chronicity pattern, unspecified headache type              This is a 57 year old year-old male who presents with multiple complaints    Recommended to be seen in the ED for elevated BP and headache..    Instructions provided as documented in the AVS.    Thank you for visiting [facility].  Please follow up with your PCP recommended to go to the ED for further evaluation and treatment..

## 2025-01-14 NOTE — TELEPHONE ENCOUNTER
Called and spoke w/ [employer] and [employer]  States patient has no active coverage.    [employer] sent to pt notifying of above  Requesting pt to submit new insurance to continue w/ PA process.

## 2025-01-31 DIAGNOSIS — R51.9 CHRONIC INTRACTABLE HEADACHE, UNSPECIFIED HEADACHE TYPE: ICD-10-CM

## 2025-01-31 DIAGNOSIS — G43.011 INTRACTABLE MIGRAINE WITHOUT AURA AND WITH STATUS MIGRAINOSUS: ICD-10-CM

## 2025-01-31 DIAGNOSIS — G89.29 CHRONIC INTRACTABLE HEADACHE, UNSPECIFIED HEADACHE TYPE: ICD-10-CM

## 2025-01-31 RX ORDER — BUTALBITAL, ACETAMINOPHEN AND CAFFEINE 50; 325; 40 MG/1; MG/1; MG/1
TABLET ORAL
Qty: 60 TABLET | Refills: 1 | Status: SHIPPED | OUTPATIENT
Start: 2025-01-31

## 2025-02-19 ENCOUNTER — TELEPHONE (OUTPATIENT)
Dept: NEUROLOGY | Facility: CLINIC | Age: 55
End: 2025-02-19

## 2025-02-19 DIAGNOSIS — G43.011 INTRACTABLE MIGRAINE WITHOUT AURA AND WITH STATUS MIGRAINOSUS: Primary | ICD-10-CM

## 2025-02-19 NOTE — TELEPHONE ENCOUNTER
Response received from [employer], requesting to resubmit PA including MIDAS and HIT scoring.  Completed via telephone.    Offered pt to come to office for samples while waiting for determination.    Will notify pt when response received.

## 2025-02-19 NOTE — TELEPHONE ENCOUNTER
Patient calling, advised she has new [employer] insurance for 2025. Would like to see if able to get botox prior authorization approved.     Please advise.

## 2025-02-20 NOTE — TELEPHONE ENCOUNTER
[facility]   Approval received for patient use of Nurtec   Approval granted: quantity of 16 is approved for 6 months        Pt notified

## 2025-02-20 NOTE — TELEPHONE ENCOUNTER
Patient stated she was advised by pharmacy the nurtec quantity for refill is not authorized for 16 pills and only approving 8 pills.    Please call patient to discuss and advise about insurance and nurtec.

## 2025-02-20 NOTE — TELEPHONE ENCOUNTER
Called and spoke w/ pharmacy  Pharmacy states they have no approval on file  Noted that pharmacy does not have the \"correct\" pharmaceutical insurance on file.    Called and spoke w/ pt, notified will have to bring in current insurance card to pharmacy as could be the culprit as to why Rx is \"not covered\"    Faxed approval notice to pt pharmacy

## 2025-02-21 RX ORDER — RIMEGEPANT SULFATE 75 MG/75MG
TABLET, ORALLY DISINTEGRATING ORAL
Qty: 16 TABLET | Refills: 3 | Status: SHIPPED | OUTPATIENT
Start: 2025-02-21

## 2025-02-21 NOTE — TELEPHONE ENCOUNTER
Medication: Rimegepant Sulfate (NURTEC) 75 MG      Date of last refill: 11/6/24 (#16/3)  [place]     Last office visit: 12/9/24  Due back to clinic per last office note:  6 mon  Date next office visit scheduled:    Future Appointments   Date Time Provider Department Center   6/11/2025 10:40 AM Roman Morataya MD [facility]           Last OV note recommendation:    Problem/s Identified this visit:   1. Intractable migraine without aura and with status migrainosus    2. Carotid artery dissection (HCC)    3. Fibromuscular dysplasia (HCC)             Discussion plus Diagnostics & Treatment Orders:  After initial exploration and possibly getting her off zonisamide leading to increased headaches and now more controlled that she is back on the same doses, it is better that we keep her in the same medication for now indefinitely.

## 2025-02-21 NOTE — TELEPHONE ENCOUNTER
Pt requesting Rx to be sent to alt pharmacy d/t troubles filing Rx    Pending to requested pharmacy

## 2025-02-21 NOTE — TELEPHONE ENCOUNTER
Received approval from [facility] for Botox.      Auth#[id number] from 02/20/25-02/19/26     This is buy and bill.      Once 200U of Botox is available, please call patient to schedule appointment.  Route back to the PA team.

## 2025-03-14 ENCOUNTER — OFFICE VISIT (OUTPATIENT)
Dept: NEUROLOGY | Facility: CLINIC | Age: 55
End: 2025-03-14
Payer: COMMERCIAL

## 2025-03-14 VITALS
BODY MASS INDEX: 23.03 KG/M2 | HEART RATE: 56 BPM | DIASTOLIC BLOOD PRESSURE: 71 MMHG | HEIGHT: 66.5 IN | WEIGHT: 145 LBS | RESPIRATION RATE: 16 BRPM | SYSTOLIC BLOOD PRESSURE: 122 MMHG

## 2025-03-14 DIAGNOSIS — G89.29 CHRONIC INTRACTABLE HEADACHE, UNSPECIFIED HEADACHE TYPE: Primary | ICD-10-CM

## 2025-03-14 DIAGNOSIS — R51.9 CHRONIC INTRACTABLE HEADACHE, UNSPECIFIED HEADACHE TYPE: Primary | ICD-10-CM

## 2025-03-14 DIAGNOSIS — G43.E01 CHRONIC MIGRAINE WITH AURA AND WITH STATUS MIGRAINOSUS, NOT INTRACTABLE: ICD-10-CM

## 2025-03-14 DIAGNOSIS — G43.011 INTRACTABLE MIGRAINE WITHOUT AURA AND WITH STATUS MIGRAINOSUS: ICD-10-CM

## 2025-03-14 PROCEDURE — 64615 CHEMODENERV MUSC MIGRAINE: CPT | Performed by: OTHER

## 2025-03-14 RX ORDER — BUTALBITAL, ACETAMINOPHEN AND CAFFEINE 50; 325; 40 MG/1; MG/1; MG/1
TABLET ORAL
Qty: 60 TABLET | Refills: 5 | Status: SHIPPED | OUTPATIENT
Start: 2025-03-14

## 2025-03-14 RX ORDER — ZONISAMIDE 100 MG/1
100 CAPSULE ORAL DAILY
Qty: 90 CAPSULE | Refills: 3 | Status: SHIPPED | OUTPATIENT
Start: 2025-03-14

## 2025-03-14 RX ORDER — RIMEGEPANT SULFATE 75 MG/75MG
TABLET, ORALLY DISINTEGRATING ORAL
Qty: 16 TABLET | Refills: 11 | Status: SHIPPED | OUTPATIENT
Start: 2025-03-14

## 2025-03-14 RX ORDER — ONDANSETRON 4 MG/1
4 TABLET, ORALLY DISINTEGRATING ORAL EVERY 8 HOURS PRN
Qty: 30 TABLET | Refills: 2 | Status: SHIPPED | OUTPATIENT
Start: 2025-03-14

## 2025-03-14 RX ORDER — ONDANSETRON 4 MG/1
4 TABLET, ORALLY DISINTEGRATING ORAL EVERY 8 HOURS PRN
COMMUNITY
End: 2025-03-14

## 2025-03-14 NOTE — PATIENT INSTRUCTIONS
Refill policies:    Allow 2-3 business days for refills; controlled substances may take longer.  Contact your pharmacy at least 5 days prior to running out of medication and have them send an electronic request or submit request through the “request refill” option in your [employer] account.  Refills are not addressed on weekends; covering physicians do not authorize routine medications on weekends.  No narcotics or controlled substances are refilled after noon on Fridays or by on call physicians.  By law, narcotics must be electronically prescribed.  A 30 day supply with no refills is the maximum allowed.  If your prescription is due for a refill, you may be due for a follow up appointment.  To best provide you care, patients receiving routine medications need to be seen at least once a year.  Patients receiving narcotic/controlled substance medications need to be seen at least once every 3 months.  In the event that your preferred pharmacy does not have the requested medication in stock (e.g. Backordered), it is your responsibility to find another pharmacy that has the requested medication available.  We will gladly send a new prescription to that pharmacy at your request.    Scheduling Tests:    If your physician has ordered radiology tests such as MRI or CT scans, please contact Central Scheduling at [phone] right away to schedule the test.  Once scheduled, the [facility] Centralized Referral Team will work with your insurance carrier to obtain pre-certification or prior authorization.  Depending on your insurance carrier, approval may take 3-10 days.  It is highly recommended patients assure they have received an authorization before having a test performed.  If test is done without insurance authorization, patient may be responsible for the entire amount billed.      Precertification and Prior Authorizations:  If your physician has recommended that you have a procedure or additional testing performed the [facility]  Centralized Referral Team will contact your insurance carrier to obtain pre-certification or prior authorization.    You are strongly encouraged to contact your insurance carrier to verify that your procedure/test has been approved and is a COVERED benefit.  Although the [facility] Centralized Referral Team does its due diligence, the insurance carrier gives the disclaimer that \"Although the procedure is authorized, this does not guarantee payment.\"    Ultimately the patient is responsible for payment.   Thank you for your understanding in this matter.  Paperwork Completion:  If you require FMLA or disability paperwork for your recovery, please make sure to either drop it off or have it faxed to our office at [phone]. Be sure the form has your name and date of birth on it.  The form will be faxed to our Forms Department and they will complete it for you.  There is a 25$ fee for all forms that need to be filled out.  Please be aware there is a 10-14 day turnaround time.  You will need to sign a release of information (ORION) form if your paperwork does not come with one.  You may call the Forms Department with any questions at [phone].  Their fax number is [phone].

## 2025-03-14 NOTE — PROGRESS NOTES
Paperwork noting that patient may bear financial responsibility for procedure(s) performed in clinic today signed prior to proceeding with procedure(s).    Furthermore, patient notified that they should contact their insurer to verify that your procedure/test has been approved and is a COVERED benefit.  Although the [facility] staff does its due diligence, the insurance carrier gives the disclaimer that \"Although the procedure is authorized, this does not guarantee payment.\"    Ultimately the patient is responsible for payment.    Botox is:  [x] Buy and Bill  [] Patient Supplied      Botox Reauthorization Questions:  Has the patient experienced a reduction in frequency of migraines since starting Botox? yes  If yes, by what percentage? 75%  Has the intensity of migraines decreased since starting Botox? yes  If yes, by what percentage? 75%    [x] I have discussed with patient that if their insurance changes they must contact the office right away with that information so that a new prior authorization can be completed.  Patient verbalized understanding that Botox cannot be performed without a current prior authorization in place with correct insurance.

## 2025-03-14 NOTE — PROGRESS NOTES
NEUROLOGY  [facility]     3/14/2025  CHRONIC MIGRAINE - BOTOX Injection  CPT: 51136    Palak Berrios is a(n) 55 year old female with chronic migraine.  Patient is here for Botox injection.      ( x ) Headaches are frequent and based on screening procedures, satisfies criteria of chronic migraine:  >15 days a month, each occurrence can be > 4 hours duration.   Note is made that she has tried 2 or more prophylactic treatment in the past and has not responded that is why we are using Botox.      ( x ) Headaches have responded well to previous Botox injection.   Follow up injection necessary because headaches are recurring.  Patient is likewise familiar with the risks. These were reviewed and patient requests to proceed.    ROS:  No additional issues added after completing 10 point ROS      Current Outpatient Medications:     ondansetron 4 MG Oral Tablet Dispersible, Take 1 tablet (4 mg total) by mouth every 8 (eight) hours as needed for Nausea., Disp: , Rfl:     Rimegepant Sulfate (NURTEC) 75 MG Oral Tablet Dispersible, 1 tab every other day, Disp: 16 tablet, Rfl: 3    butalbital-acetaminophen-caffeine -40 MG Oral Tab, 1 tab BID prn, Disp: 60 tablet, Rfl: 1    Cholecalciferol 125 MCG (5000 UT) Oral Tab, Take 1 tablet (5,000 Units total) by mouth daily., Disp: , Rfl:     ZONISAMIDE 100 MG Oral Cap, TAKE 1 CAPSULE BY MOUTH EVERY DAY, Disp: 90 capsule, Rfl: 3    rivaroxaban (XARELTO) 20 MG Oral Tab, Take 1 tablet (20 mg total) by mouth daily with food., Disp: , Rfl:     Multiple Vitamin (MULTIVITAMIN ADULT OR), Take 1 tablet by mouth daily., Disp: , Rfl:     Cobalamin Combinations (VITAMIN B12-FOLIC ACID OR), Take 1 tablet by mouth daily., Disp: , Rfl:     atorvastatin 40 MG Oral Tab, Take 1 tablet (40 mg total) by mouth nightly., Disp: 90 tablet, Rfl: 3    amLODIPine Besylate (NORVASC) 5 MG Oral Tab, Take 1 tablet (5 mg total) by mouth daily., Disp: 90 tablet, Rfl: 3      /71 (BP  Location: Left arm, Patient Position: Sitting, Cuff Size: adult)   Pulse 56   Resp 16   Ht 66.5\"   Wt 145 lb (65.8 kg)   BMI 23.05 kg/m²   HENT:  Pink conjunctiva, anicteric sclerae, moist mucosa  Neck: No adenopathy or thyromegaly  Heart S1S2, no murmur  Lungs are clear, no wheezing  Extremities: no cyanosis or edema      PROCEDURE:  Assisted by: CMA or RN in room  BOTOX injection for chronic migraine:  Using alcohol prep, fixed site protocol performed.  Botox was reconstituted to the following concentration:  5 units per 0.1 ml.      Muscles, number of sites, units used and Side injected were as follows:                                                 Units used     Side  Procerus   5 units        center  Corrugators 2 sites  10 units      R/L  Frontalis   4 sites  20 units      R/L  Temporalis   4 sites each side  40 units      R/L  Occipitalis:            3 sites each side             30 units      R/L        Cervical paraspinals   2 sites each side 20 units      R/L  Upper trapezius   3 sites each side 30 units      R/L                                  TOTAL       155 units  Discarded:  45 units        ADDITIONAL ADJUNCTIVE SITES  (   ) Masseters    units  (   ) Levator scapula   units  (   ) Rhomboids      units        IMPRESSION:  1. Chronic intractable headache, unspecified headache type    2. Chronic migraine with aura and with status migrainosus, not intractable    3. Intractable migraine without aura and with status migrainosus        Post injections instructions:  Expect response to start on the second or going into the 3rd week  Use ice packs and Tylenol ES if with pain at injections sites  Report any signs of infection or inflammation at site of injection  Use migraine medications the same was as before    After procedure check out process by PSRs and rooming RN/MA who were present during the procedure to assist.        Roman Morataya MD  Vascular & General Neurology  [facility]  [place]

## 2025-04-30 ENCOUNTER — TELEPHONE (OUTPATIENT)
Dept: NEUROLOGY | Facility: CLINIC | Age: 55
End: 2025-04-30

## 2025-04-30 ENCOUNTER — LAB ENCOUNTER (OUTPATIENT)
Dept: LAB | Age: 55
End: 2025-04-30
Attending: Other
Payer: COMMERCIAL

## 2025-04-30 ENCOUNTER — OFFICE VISIT (OUTPATIENT)
Dept: NEUROLOGY | Facility: CLINIC | Age: 55
End: 2025-04-30
Payer: COMMERCIAL

## 2025-04-30 VITALS
DIASTOLIC BLOOD PRESSURE: 66 MMHG | RESPIRATION RATE: 16 BRPM | HEIGHT: 66.5 IN | WEIGHT: 145 LBS | HEART RATE: 56 BPM | BODY MASS INDEX: 23.03 KG/M2 | SYSTOLIC BLOOD PRESSURE: 110 MMHG

## 2025-04-30 DIAGNOSIS — I25.42 DISSECTION OF CORONARY ARTERY: ICD-10-CM

## 2025-04-30 DIAGNOSIS — R55 SYNCOPE AND COLLAPSE: ICD-10-CM

## 2025-04-30 DIAGNOSIS — I77.3 FIBROMUSCULAR DYSPLASIA: ICD-10-CM

## 2025-04-30 DIAGNOSIS — G89.29 CHRONIC INTRACTABLE HEADACHE, UNSPECIFIED HEADACHE TYPE: Primary | ICD-10-CM

## 2025-04-30 DIAGNOSIS — R51.9 CHRONIC INTRACTABLE HEADACHE, UNSPECIFIED HEADACHE TYPE: Primary | ICD-10-CM

## 2025-04-30 DIAGNOSIS — G43.E01 CHRONIC MIGRAINE WITH AURA AND WITH STATUS MIGRAINOSUS, NOT INTRACTABLE: ICD-10-CM

## 2025-04-30 DIAGNOSIS — G89.29 CHRONIC INTRACTABLE HEADACHE, UNSPECIFIED HEADACHE TYPE: ICD-10-CM

## 2025-04-30 DIAGNOSIS — R51.9 CHRONIC INTRACTABLE HEADACHE, UNSPECIFIED HEADACHE TYPE: ICD-10-CM

## 2025-04-30 PROCEDURE — 36415 COLL VENOUS BLD VENIPUNCTURE: CPT

## 2025-04-30 PROCEDURE — 99214 OFFICE O/P EST MOD 30 MIN: CPT | Performed by: OTHER

## 2025-04-30 PROCEDURE — 80203 DRUG SCREEN QUANT ZONISAMIDE: CPT

## 2025-04-30 RX ORDER — PREDNISONE 20 MG/1
20 TABLET ORAL DAILY
COMMUNITY
Start: 2025-04-17 | End: 2025-04-30 | Stop reason: ALTCHOICE

## 2025-04-30 NOTE — PROGRESS NOTES
NEUROLOGY  [facility]       Palak CARSON Agustina  2/17/1970  Primary Care Provider:  Carmen Carrillo DO    4/30/2025  55 year old yo,  was last seen on:: March 2025    Seen for/plans last visit:  Migraines   History of coronary and carotid dissection      Previous visit and existing record notes reviewed in preparation for the face to face visit.  Relevant labs and studies reviewed and will be noted in relevant areas of this record.  Accompanied visit:     (x) No.      Present condition:  Had a spell a week ago while watching volleyball.  Hands got tingly and felt funny and was feeling like she was lightheaded yet when they put on the ground and legs raised, her BP was high    She was taken by paramedics and could have passed out while on stretcher being taken to hospital     She had a headache afterwards   was nauseous  No URI but tested positive for some virus (not Covid)    CTA and MRI all negative  ECHo normal      HEADACHES are still daily   The tension in the back of the neck is somewhat better      Past History update/new problem(s):     Review of Systems:  Review of Systems:  Denies systemic symptoms     No CP or SOB.  No GI or  symptoms. Relevant Neuro as noted above.      Medications:    Medications - Current[1]  PRN: PRN Medications[2]    Allergies:  Allergies[3]       EXAM:  /66 (BP Location: Left arm, Patient Position: Sitting, Cuff Size: adult)   Pulse 56   Resp 16   Ht 66.5\"   Wt 145 lb (65.8 kg)   BMI 23.05 kg/m²   Looks stated age  General Exam:  HENT:  pink conjunctiva anicteric sclerae  Neck no adenopathy, thyroid normal  Heart and Lungs:  normal  Extremities: no cyanosis, skin changes    NEURO  NEURO  Able to relate events with fluent speech and intact comprehension  CN 2-12: pupils reactive, VF full face symmetric sensation and movement tongue midline  No motor focal findings  Sensory: no lateralizing findings  Reflexes are symmetric  UMN signs: none  Gait: narrow  based          INTERPRETATION of RELEVANT LABS and other DATA:          Problem/s Identified this visit:   No diagnosis found.      Discussion plus Diagnostics & Treatment Orders:  Not sure what the event was but more serious causes were ruled out including dissections and strokes    Therefore remaining considerations are likely be benign and at this point, we can only speculate different scenarios   Vestibular that triggered a vasovagal?   Seizure? Although already on Zonisamide   Migraine equivalent???        Procedures    Zonisamide Quantitative   EEG      (x) Discussed potential side effects of any treatment relevant to above.  Includes explanation of tests as necessary.    Next Botox appointment    Patient understands that if needed, based on condition and or test results, follow up will be readjusted      Roman Morataya MD  Vascular & General Neurology  Director, Multiple Sclerosis Program  [facility]  4/30/2025, Time completed 8:49 AM    Decision making:  ( x ) labs reviewed/ordered - 1  (  ) new diagnosis: - 1  ( x) Images & studies independently reviewed -non F2F  (  ) Case/studies discussed with other caregivers - -non F2F  (  ) Telephone time with patiern or authorized Fam member--non F2F  ( x ) other records reviewed --non F2F including consultations  (  ) [facility] meetings - patient not present --non F2F  (  ) Independent Historian obtained    Non Face to Face CPT code 34481/90116 applies as documented above    PROCEDURE DONE     (   ) see notes        After visit, patient was escorted out and handed-off discharge process and instructions to the check out desk.  No additional issues relevant to visit were raised to staff at this time interval.        This document is to be interpreted as my current opinion regarding the case as of the stated date of service based on the information available to me at this time and may supersedes any prior opinion expressed either orally or in writing.   Services rendered are only within the scope of direct medical care  Sometimes, reports may have been prepared partially using a speech recognition software technology.  If a word or phrase is confusing or out of context, please do not hesitate to call for clarification.              [1]   Current Outpatient Medications:     butalbital-acetaminophen-caffeine -40 MG Oral Tab, 1 tab BID prn, Disp: 60 tablet, Rfl: 5    Rimegepant Sulfate (NURTEC) 75 MG Oral Tablet Dispersible, 1 tab every other day, Disp: 16 tablet, Rfl: 11    zonisamide 100 MG Oral Cap, Take 1 capsule (100 mg total) by mouth daily., Disp: 90 capsule, Rfl: 3    ondansetron 4 MG Oral Tablet Dispersible, Take 1 tablet (4 mg total) by mouth every 8 (eight) hours as needed for Nausea., Disp: 30 tablet, Rfl: 2    Cholecalciferol 125 MCG (5000 UT) Oral Tab, Take 1 tablet (5,000 Units total) by mouth daily., Disp: , Rfl:     rivaroxaban (XARELTO) 20 MG Oral Tab, Take 1 tablet (20 mg total) by mouth daily with food., Disp: , Rfl:     Multiple Vitamin (MULTIVITAMIN ADULT OR), Take 1 tablet by mouth daily., Disp: , Rfl:     Cobalamin Combinations (VITAMIN B12-FOLIC ACID OR), Take 1 tablet by mouth daily., Disp: , Rfl:     atorvastatin 40 MG Oral Tab, Take 1 tablet (40 mg total) by mouth nightly., Disp: 90 tablet, Rfl: 3    amLODIPine Besylate (NORVASC) 5 MG Oral Tab, Take 1 tablet (5 mg total) by mouth daily., Disp: 90 tablet, Rfl: 3  [2] [3]   Allergies  Allergen Reactions    Clavulanic Acid HIVES    Augmentin [Amoxicillin-Pot Clavulanate] HIVES    Cephalexin OTHER (SEE COMMENTS)     Oral, hives    Keflex [Cephalexin Monohydrate] HIVES

## 2025-04-30 NOTE — PATIENT INSTRUCTIONS
Refill policies:    Allow 2-3 business days for refills; controlled substances may take longer.  Contact your pharmacy at least 5 days prior to running out of medication and have them send an electronic request or submit request through the “request refill” option in your [employer] account.  Refills are not addressed on weekends; covering physicians do not authorize routine medications on weekends.  No narcotics or controlled substances are refilled after noon on Fridays or by on call physicians.  By law, narcotics must be electronically prescribed.  A 30 day supply with no refills is the maximum allowed.  If your prescription is due for a refill, you may be due for a follow up appointment.  To best provide you care, patients receiving routine medications need to be seen at least once a year.  Patients receiving narcotic/controlled substance medications need to be seen at least once every 3 months.  In the event that your preferred pharmacy does not have the requested medication in stock (e.g. Backordered), it is your responsibility to find another pharmacy that has the requested medication available.  We will gladly send a new prescription to that pharmacy at your request.    Scheduling Tests:    If your physician has ordered radiology tests such as MRI or CT scans, please contact Central Scheduling at [phone] right away to schedule the test.  Once scheduled, the [facility] Centralized Referral Team will work with your insurance carrier to obtain pre-certification or prior authorization.  Depending on your insurance carrier, approval may take 3-10 days.  It is highly recommended patients assure they have received an authorization before having a test performed.  If test is done without insurance authorization, patient may be responsible for the entire amount billed.      Precertification and Prior Authorizations:  If your physician has recommended that you have a procedure or additional testing performed the [facility]  Centralized Referral Team will contact your insurance carrier to obtain pre-certification or prior authorization.    You are strongly encouraged to contact your insurance carrier to verify that your procedure/test has been approved and is a COVERED benefit.  Although the [facility] Centralized Referral Team does its due diligence, the insurance carrier gives the disclaimer that \"Although the procedure is authorized, this does not guarantee payment.\"    Ultimately the patient is responsible for payment.   Thank you for your understanding in this matter.  Paperwork Completion:  If you require FMLA or disability paperwork for your recovery, please make sure to either drop it off or have it faxed to our office at [phone]. Be sure the form has your name and date of birth on it.  The form will be faxed to our Forms Department and they will complete it for you.  There is a 25$ fee for all forms that need to be filled out.  Please be aware there is a 10-14 day turnaround time.  You will need to sign a release of information (ORION) form if your paperwork does not come with one.  You may call the Forms Department with any questions at [phone].  Their fax number is [phone].

## 2025-05-01 ENCOUNTER — OFFICE VISIT (OUTPATIENT)
Dept: FAMILY MEDICINE CLINIC | Facility: CLINIC | Age: 55
End: 2025-05-01
Payer: COMMERCIAL

## 2025-05-01 VITALS
OXYGEN SATURATION: 98 % | DIASTOLIC BLOOD PRESSURE: 74 MMHG | WEIGHT: 143 LBS | SYSTOLIC BLOOD PRESSURE: 112 MMHG | RESPIRATION RATE: 16 BRPM | BODY MASS INDEX: 22.71 KG/M2 | HEART RATE: 78 BPM | HEIGHT: 66.5 IN

## 2025-05-01 DIAGNOSIS — R55 SYNCOPE, UNSPECIFIED SYNCOPE TYPE: Primary | ICD-10-CM

## 2025-05-01 DIAGNOSIS — I42.9 CARDIOMYOPATHY, UNSPECIFIED TYPE (HCC): ICD-10-CM

## 2025-05-01 DIAGNOSIS — I77.71 CAROTID ARTERY DISSECTION (HCC): ICD-10-CM

## 2025-05-01 DIAGNOSIS — Z00.00 GENERAL MEDICAL EXAM: ICD-10-CM

## 2025-05-01 DIAGNOSIS — R06.83 SNORING: ICD-10-CM

## 2025-05-01 DIAGNOSIS — I77.3 FIBROMUSCULAR DYSPLASIA: ICD-10-CM

## 2025-05-01 PROCEDURE — 99214 OFFICE O/P EST MOD 30 MIN: CPT | Performed by: FAMILY MEDICINE

## 2025-05-03 ENCOUNTER — PATIENT MESSAGE (OUTPATIENT)
Dept: NEUROLOGY | Facility: CLINIC | Age: 55
End: 2025-05-03

## 2025-05-03 DIAGNOSIS — R51.9 CHRONIC INTRACTABLE HEADACHE, UNSPECIFIED HEADACHE TYPE: Primary | ICD-10-CM

## 2025-05-03 DIAGNOSIS — R51.9 CHRONIC INTRACTABLE HEADACHE, UNSPECIFIED HEADACHE TYPE: ICD-10-CM

## 2025-05-03 DIAGNOSIS — G43.011 INTRACTABLE MIGRAINE WITHOUT AURA AND WITH STATUS MIGRAINOSUS: ICD-10-CM

## 2025-05-03 DIAGNOSIS — G89.29 CHRONIC INTRACTABLE HEADACHE, UNSPECIFIED HEADACHE TYPE: Primary | ICD-10-CM

## 2025-05-03 DIAGNOSIS — G89.29 CHRONIC INTRACTABLE HEADACHE, UNSPECIFIED HEADACHE TYPE: ICD-10-CM

## 2025-05-03 LAB — ZONISAMIDE LVL: 2.3 UG/ML

## 2025-05-05 RX ORDER — BUTALBITAL, ACETAMINOPHEN AND CAFFEINE 50; 325; 40 MG/1; MG/1; MG/1
1 TABLET ORAL 2 TIMES DAILY PRN
Qty: 180 TABLET | Refills: 1 | Status: SHIPPED | OUTPATIENT
Start: 2025-05-05

## 2025-05-05 NOTE — TELEPHONE ENCOUNTER
Medication: Butalbital 50/325/40 mg     Date of last refill: 03/14/2025 with 5 addt refills  [place]      Last office visit: 04/30/25  Due back to clinic per last office note:    Date next office visit scheduled:    Future Appointments   Date Time Provider Department Center   5/9/2025  1:30 PM [facility]   6/13/2025  8:40 AM Roman Morataya MD [facility] [place]   11/19/2025  7:30 AM Carmen Carrillo DO EMG 13 [facility] [address]           Last OV note recommendation:    Discussion plus Diagnostics & Treatment Orders:  Not sure what the event was but more serious causes were ruled out including dissections and strokes     Therefore remaining considerations are likely be benign and at this point, we can only speculate different scenarios                Vestibular that triggered a vasovagal?                Seizure? Although already on Zonisamide                Migraine equivalent???             Procedures    Zonisamide Quantitative   EEG        (x) Discussed potential side effects of any treatment relevant to above.  Includes explanation of tests as necessary.     Next Botox appointment

## 2025-05-05 NOTE — TELEPHONE ENCOUNTER
Per Dr. Morataya's note, patient to increase Zonisamide to 150 mg daily as level is low.  Patient reports having 100 mg capsules and 25 mg capsules only.  Patient asked if a new Rx for Zonisamide 150 mg is needed.

## 2025-05-07 RX ORDER — ZONISAMIDE 50 MG/1
50 CAPSULE ORAL DAILY
Qty: 90 CAPSULE | Refills: 3 | Status: SHIPPED | OUTPATIENT
Start: 2025-05-07

## 2025-05-07 RX ORDER — ZONISAMIDE 100 MG/1
100 CAPSULE ORAL DAILY
Qty: 90 CAPSULE | Refills: 3 | Status: SHIPPED | OUTPATIENT
Start: 2025-05-07

## 2025-05-09 ENCOUNTER — NURSE ONLY (OUTPATIENT)
Dept: ELECTROPHYSIOLOGY | Facility: HOSPITAL | Age: 55
End: 2025-05-09
Attending: Other
Payer: COMMERCIAL

## 2025-05-09 DIAGNOSIS — R55 SYNCOPE AND COLLAPSE: ICD-10-CM

## 2025-05-09 PROCEDURE — 95819 EEG AWAKE AND ASLEEP: CPT

## 2025-05-12 NOTE — PROCEDURES
[facility]    VIDEO-ELECTROENCEPHALOGRAM (EEG) REPORT  Patient Name:  Palak Berrios   MRN / CSN:  [id number] / [id number]   Date of Birth / Age:  2/17/1970 /  55 year old   Encounter (Start) Date:  5/9/25    Study Duration: 44 min     HISTORY  This is a 55 year old female who presents with concern for seizure.    METHODS:  Twenty-two electrodes were applied according to the 10-20 electrode placement system on this audio-video EEG. EKG monitoring, monopolar and bipolar montages are routinely utilized. Video-EEG data were recorded continuously (or only for the initial portion, in the case of ambulatory studies) and stored digitally.    FINDINGS:  1) Background: A 8-9 Hz posterior dominant rhythm (PDR) was seen, symmetric and synchronous, reactive to eye opening.  2) Sleep: Stage N1 / drowsiness and Stage N2 were recorded, with normal, symmetric, and synchronous architecture.  3) Activation Procedures:                    Hyperventilation was performed with normal symmetric slow wave buildup.                    Photic stimulation was performed and no significant changes were noted.  4) Abnormal Slowing:  Generalized semi-rhythmic, or frontal intermittent rhythmic delta activity (FIRDA) are noted maddi in drowsiness and post-HV.  Unclear if truly abnormal.  5) Epileptiform Activity:  None  6) Seizures:  None    FINAL INTERPRETATION:    This EEG is normal in waking, drowsiness, and sleep.  (The study did capture FIRDA, although this could represent mild nonspecific generalized cerebral dysfunction, it is a relatively mild finding and not necessarily abnormal.)  No evidence of seizures or epilepsy is noted.    Prieto Rodriguez MD, FAES, FAAN  Board-Certified in Neurology, Epilepsy, and Clinical Neurophysiology  [facility]

## 2025-05-24 DIAGNOSIS — R51.9 CHRONIC INTRACTABLE HEADACHE, UNSPECIFIED HEADACHE TYPE: ICD-10-CM

## 2025-05-24 DIAGNOSIS — G43.011 INTRACTABLE MIGRAINE WITHOUT AURA AND WITH STATUS MIGRAINOSUS: ICD-10-CM

## 2025-05-24 DIAGNOSIS — G89.29 CHRONIC INTRACTABLE HEADACHE, UNSPECIFIED HEADACHE TYPE: ICD-10-CM

## 2025-05-27 RX ORDER — BUTALBITAL, ACETAMINOPHEN AND CAFFEINE 50; 325; 40 MG/1; MG/1; MG/1
1 TABLET ORAL 2 TIMES DAILY PRN
Qty: 60 TABLET | Refills: 0 | OUTPATIENT
Start: 2025-05-27

## 2025-05-27 NOTE — TELEPHONE ENCOUNTER
Medication: Butalbital 50/325/40 mg     Date of last refill: 05/05/2025 # 180/1  [place]      Last office visit: 04/30/25  Due back to clinic per last office note:    Date next office visit scheduled:           Future Appointments   Date Time Provider Department Center   5/9/2025  1:30 PM [facility]   6/13/2025  8:40 AM Roman Morataya MD [facility] [place]   11/19/2025  7:30 AM Carmen Carrillo DO EMG 13 [facility] [address]            Last OV note recommendation:     Discussion plus Diagnostics & Treatment Orders:  Not sure what the event was but more serious causes were ruled out including dissections and strokes     Therefore remaining considerations are likely be benign and at this point, we can only speculate different scenarios                Vestibular that triggered a vasovagal?                Seizure? Although already on Zonisamide                Migraine equivalent???               Procedures    Zonisamide Quantitative   EEG        (x) Discussed potential side effects of any treatment relevant to above.  Includes explanation of tests as necessary.     Next Botox appointment

## 2025-06-13 ENCOUNTER — OFFICE VISIT (OUTPATIENT)
Dept: NEUROLOGY | Facility: CLINIC | Age: 55
End: 2025-06-13
Payer: COMMERCIAL

## 2025-06-13 VITALS
BODY MASS INDEX: 22.71 KG/M2 | WEIGHT: 143 LBS | HEART RATE: 62 BPM | DIASTOLIC BLOOD PRESSURE: 60 MMHG | HEIGHT: 66.5 IN | RESPIRATION RATE: 16 BRPM | SYSTOLIC BLOOD PRESSURE: 136 MMHG

## 2025-06-13 DIAGNOSIS — R51.9 CHRONIC INTRACTABLE HEADACHE, UNSPECIFIED HEADACHE TYPE: Primary | ICD-10-CM

## 2025-06-13 DIAGNOSIS — G89.29 CHRONIC INTRACTABLE HEADACHE, UNSPECIFIED HEADACHE TYPE: Primary | ICD-10-CM

## 2025-06-13 DIAGNOSIS — G43.011 INTRACTABLE MIGRAINE WITHOUT AURA AND WITH STATUS MIGRAINOSUS: ICD-10-CM

## 2025-06-13 PROCEDURE — 64615 CHEMODENERV MUSC MIGRAINE: CPT | Performed by: OTHER

## 2025-06-13 RX ORDER — RIMEGEPANT SULFATE 75 MG/75MG
TABLET, ORALLY DISINTEGRATING ORAL
Qty: 16 TABLET | Refills: 11 | Status: SHIPPED | OUTPATIENT
Start: 2025-06-13

## 2025-06-13 RX ORDER — ONDANSETRON 4 MG/1
4 TABLET, ORALLY DISINTEGRATING ORAL EVERY 8 HOURS PRN
Qty: 30 TABLET | Refills: 5 | Status: SHIPPED | OUTPATIENT
Start: 2025-06-13

## 2025-06-13 RX ORDER — BUTALBITAL, ACETAMINOPHEN AND CAFFEINE 50; 325; 40 MG/1; MG/1; MG/1
1 TABLET ORAL 2 TIMES DAILY PRN
Qty: 180 TABLET | Refills: 1 | Status: SHIPPED | OUTPATIENT
Start: 2025-06-13

## 2025-06-13 NOTE — PROGRESS NOTES
Paperwork noting that patient may bear financial responsibility for procedure(s) performed in clinic today signed prior to proceeding with procedure(s).    Furthermore, patient notified that they should contact their insurer to verify that your procedure/test has been approved and is a COVERED benefit.  Although the [facility] staff does its due diligence, the insurance carrier gives the disclaimer that \"Although the procedure is authorized, this does not guarantee payment.\"    Ultimately the patient is responsible for payment.    Botox is:  [x] Buy and Bill  [] Patient Supplied      Botox Reauthorization Questions:  Has the patient experienced a reduction in frequency of migraines since starting Botox? yes  If yes, by what percentage? 75%  Has the intensity of migraines decreased since starting Botox? yes  If yes, by what percentage? 75%    [x] I have discussed with patient that if their insurance changes they must contact the office right away with that information so that a new prior authorization can be completed.  Patient verbalized understanding that Botox cannot be performed without a current prior authorization in place with correct insurance.

## 2025-06-13 NOTE — PROGRESS NOTES
NEUROLOGY  [facility]     6/13/2025  CHRONIC MIGRAINE - BOTOX Injection  CPT: 05542    Palak Berrios is a(n) 55 year old female with chronic migraine.  Patient is here for Botox injection.      ( x ) Headaches are frequent and based on screening procedures, satisfies criteria of chronic migraine:  >15 days a month, each occurrence can be > 4 hours duration.   Note is made that she has tried 2 or more prophylactic treatment in the past and has not responded that is why we are using Botox.      ( x ) Headaches have responded well to previous Botox injection.   Follow up injection necessary because headaches are recurring.  Patient is likewise familiar with the risks. These were reviewed and patient requests to proceed.    ROS:  No additional issues added after completing 10 point ROS    Medications - Current[1]      /60 (BP Location: Left arm, Patient Position: Sitting, Cuff Size: adult)   Pulse 62   Resp 16   Ht 66.5\"   Wt 143 lb (64.9 kg)   BMI 22.74 kg/m²   HENT:  Pink conjunctiva, anicteric sclerae, moist mucosa  Neck: No adenopathy or thyromegaly  Heart S1S2, no murmur  Lungs are clear, no wheezing  Extremities: no cyanosis or edema      PROCEDURE:  Assisted by: CMA or RN in room  BOTOX injection for chronic migraine:  Using alcohol prep, fixed site protocol performed.  Botox was reconstituted to the following concentration:  5 units per 0.1 ml.      Muscles, number of sites, units used and Side injected were as follows:                                                 Units used     Side  Procerus   5 units        center  Corrugators 2 sites  10 units      R/L  Frontalis   4 sites  20 units      R/L  Temporalis   4 sites each side  40 units      R/L  Occipitalis:            3 sites each side             30 units      R/L        Cervical paraspinals   2 sites each side 20 units      R/L  Upper trapezius   3 sites each side 30 units      R/L                                  TOTAL        155 units  Discarded:  45 units        ADDITIONAL ADJUNCTIVE SITE     SIDE (R/L/B)  Masseters    units          Levator scapula   units    Rhomboids       units         IMPRESSION:  1. Chronic intractable headache, unspecified headache type    2. Intractable migraine without aura and with status migrainosus        Post injections instructions:  Expect response to start on the second or going into the 3rd week  Use ice packs and Tylenol ES if with pain at injections sites  Report any signs of infection or inflammation at site of injection  Use migraine medications the same was as before    After procedure check out process by PSRs and rooming RN/MA who were present during the procedure to assist.        Roman Morataya MD  Vascular & General Neurology  [facility]             [1]   Current Outpatient Medications:     butalbital-acetaminophen-caffeine -40 MG Oral Tab, Take 1 tablet by mouth 2 (two) times daily as needed., Disp: 180 tablet, Rfl: 1    ondansetron 4 MG Oral Tablet Dispersible, Take 1 tablet (4 mg total) by mouth every 8 (eight) hours as needed for Nausea., Disp: 30 tablet, Rfl: 5    Rimegepant Sulfate (NURTEC) 75 MG Oral Tablet Dispersible, 1 tab every other day, Disp: 16 tablet, Rfl: 11    zonisamide 100 MG Oral Cap, Take 1 capsule (100 mg total) by mouth daily. TAKE IN ADDITION TO 50 MG CAPSULE FOR A TOTAL  MG DAILY, Disp: 90 capsule, Rfl: 3    zonisamide 50 MG Oral Cap, Take 1 capsule (50 mg total) by mouth daily. TAKE IN ADDITION  MG CAPSULE FOR A TOTAL  MG DAILY, Disp: 90 capsule, Rfl: 3    Cholecalciferol 125 MCG (5000 UT) Oral Tab, Take 1 tablet (5,000 Units total) by mouth daily., Disp: , Rfl:     rivaroxaban (XARELTO) 20 MG Oral Tab, Take 1 tablet (20 mg total) by mouth daily with food., Disp: , Rfl:     Multiple Vitamin (MULTIVITAMIN ADULT OR), Take 1 tablet by mouth daily., Disp: , Rfl:     Cobalamin Combinations (VITAMIN B12-FOLIC ACID OR), Take  1 tablet by mouth daily., Disp: , Rfl:     atorvastatin 40 MG Oral Tab, Take 1 tablet (40 mg total) by mouth nightly., Disp: 90 tablet, Rfl: 3    amLODIPine Besylate (NORVASC) 5 MG Oral Tab, Take 1 tablet (5 mg total) by mouth daily., Disp: 90 tablet, Rfl: 3

## 2025-06-13 NOTE — PATIENT INSTRUCTIONS
Refill policies:    Allow 2-3 business days for refills; controlled substances may take longer.  Contact your pharmacy at least 5 days prior to running out of medication and have them send an electronic request or submit request through the “request refill” option in your [employer] account.  Refills are not addressed on weekends; covering physicians do not authorize routine medications on weekends.  No narcotics or controlled substances are refilled after noon on Fridays or by on call physicians.  By law, narcotics must be electronically prescribed.  A 30 day supply with no refills is the maximum allowed.  If your prescription is due for a refill, you may be due for a follow up appointment.  To best provide you care, patients receiving routine medications need to be seen at least once a year.  Patients receiving narcotic/controlled substance medications need to be seen at least once every 3 months.  In the event that your preferred pharmacy does not have the requested medication in stock (e.g. Backordered), it is your responsibility to find another pharmacy that has the requested medication available.  We will gladly send a new prescription to that pharmacy at your request.    Scheduling Tests:    If your physician has ordered radiology tests such as MRI or CT scans, please contact Central Scheduling at [phone] right away to schedule the test.  Once scheduled, the [facility] Centralized Referral Team will work with your insurance carrier to obtain pre-certification or prior authorization.  Depending on your insurance carrier, approval may take 3-10 days.  It is highly recommended patients assure they have received an authorization before having a test performed.  If test is done without insurance authorization, patient may be responsible for the entire amount billed.      Precertification and Prior Authorizations:  If your physician has recommended that you have a procedure or additional testing performed the [facility]  Centralized Referral Team will contact your insurance carrier to obtain pre-certification or prior authorization.    You are strongly encouraged to contact your insurance carrier to verify that your procedure/test has been approved and is a COVERED benefit.  Although the [facility] Centralized Referral Team does its due diligence, the insurance carrier gives the disclaimer that \"Although the procedure is authorized, this does not guarantee payment.\"    Ultimately the patient is responsible for payment.   Thank you for your understanding in this matter.  Paperwork Completion:  If you require FMLA or disability paperwork for your recovery, please make sure to either drop it off or have it faxed to our office at [phone]. Be sure the form has your name and date of birth on it.  The form will be faxed to our Forms Department and they will complete it for you.  There is a 25$ fee for all forms that need to be filled out.  Please be aware there is a 10-14 day turnaround time.  You will need to sign a release of information (ORION) form if your paperwork does not come with one.  You may call the Forms Department with any questions at [phone].  Their fax number is [phone].

## 2025-07-17 ENCOUNTER — OFFICE VISIT (OUTPATIENT)
Dept: SLEEP CENTER | Age: 55
End: 2025-07-17
Attending: FAMILY MEDICINE
Payer: COMMERCIAL

## 2025-07-17 DIAGNOSIS — R06.83 SNORING: ICD-10-CM

## 2025-07-17 PROCEDURE — 95810 POLYSOM 6/> YRS 4/> PARAM: CPT

## 2025-08-15 ENCOUNTER — SLEEP STUDY (OUTPATIENT)
Facility: CLINIC | Age: 55
End: 2025-08-15

## 2025-08-15 DIAGNOSIS — G47.33 OBSTRUCTIVE SLEEP APNEA SYNDROME: Primary | ICD-10-CM

## 2025-08-15 PROCEDURE — 95810 POLYSOM 6/> YRS 4/> PARAM: CPT | Performed by: OTHER

## 2025-08-26 ENCOUNTER — TELEPHONE (OUTPATIENT)
Dept: NEUROLOGY | Facility: CLINIC | Age: 55
End: 2025-08-26

## 2025-08-26 ENCOUNTER — OFFICE VISIT (OUTPATIENT)
Facility: CLINIC | Age: 55
End: 2025-08-26

## 2025-08-26 VITALS
BODY MASS INDEX: 23.19 KG/M2 | SYSTOLIC BLOOD PRESSURE: 132 MMHG | OXYGEN SATURATION: 99 % | RESPIRATION RATE: 16 BRPM | TEMPERATURE: 98 F | HEART RATE: 64 BPM | DIASTOLIC BLOOD PRESSURE: 74 MMHG | HEIGHT: 66.5 IN | WEIGHT: 146 LBS

## 2025-08-26 DIAGNOSIS — R06.83 SNORING: Primary | ICD-10-CM

## 2025-08-26 DIAGNOSIS — R53.83 FATIGUE, UNSPECIFIED TYPE: ICD-10-CM

## 2025-08-26 DIAGNOSIS — R91.8 PULMONARY NODULES: ICD-10-CM

## 2025-08-26 DIAGNOSIS — G47.33 OSA (OBSTRUCTIVE SLEEP APNEA): ICD-10-CM

## 2025-08-26 DIAGNOSIS — I77.3 FIBROMUSCULAR DYSPLASIA: ICD-10-CM

## 2025-08-26 DIAGNOSIS — R59.0 MEDIASTINAL ADENOPATHY: ICD-10-CM

## 2025-08-26 PROCEDURE — 99214 OFFICE O/P EST MOD 30 MIN: CPT | Performed by: INTERNAL MEDICINE

## 2025-08-27 ENCOUNTER — TELEPHONE (OUTPATIENT)
Facility: CLINIC | Age: 55
End: 2025-08-27

## (undated) DIAGNOSIS — G43.011 INTRACTABLE MIGRAINE WITHOUT AURA AND WITH STATUS MIGRAINOSUS: ICD-10-CM

## (undated) DIAGNOSIS — G89.29 CHRONIC INTRACTABLE HEADACHE, UNSPECIFIED HEADACHE TYPE: ICD-10-CM

## (undated) DIAGNOSIS — R51.9 CHRONIC INTRACTABLE HEADACHE, UNSPECIFIED HEADACHE TYPE: ICD-10-CM

## (undated) DEVICE — PINNACLE INTRODUCER SHEATH: Brand: PINNACLE

## (undated) DEVICE — Device

## (undated) DEVICE — STERILE POLYISOPRENE POWDER-FREE SURGICAL GLOVES: Brand: PROTEXIS

## (undated) DEVICE — PLASMABLADE X PS210-030S-LIGHT 3.0SL: Brand: PLASMABLADE™ X

## (undated) DEVICE — LAWSON - CONTAINER SPCMN STL 5OZ

## (undated) DEVICE — Device: Brand: ASAHI GLADIUS MONGO14 ES

## (undated) DEVICE — LACTATED R INJ

## (undated) DEVICE — OPEN HEART: Brand: MEDLINE INDUSTRIES, INC.

## (undated) DEVICE — NEEDLE'S EYE SNARE RETRIEVAL SET: Brand: NEEDLE'S EYE

## (undated) DEVICE — SOF-GRIP HEMOSTAT: Brand: SOF-GRIP HEMOSTAT

## (undated) DEVICE — KIT INFL DEV 20ML W/ INSRT TOOL 3 W STPCOCK

## (undated) DEVICE — FIXED CORE WIRE GUIDE SAFE-T-J, CURVED: Brand: COOK

## (undated) DEVICE — CURVED 16 FR INTRODUCER SHEATH SET - FEMORAL: Brand: COOK

## (undated) DEVICE — Device: Brand: ASAHI GLADIUS18

## (undated) DEVICE — LAWSON - GOWN SURG STD XL STL DISP

## (undated) DEVICE — BLADE SAW 6.4X32MM THK0.79MM CUT THK1.02MM

## (undated) DEVICE — PACK CUSTOM PACEMAKER

## (undated) DEVICE — BULLDOG LEAD EXTENDER: Brand: BULLDOG

## (undated) NOTE — LETTER
August 22, 2017    Deidra Mccullough  [address]      Dear Ellen Julien:    CT of Cervical spine showed only degenerative arthritis and the CT Angiogram showed no evidence of acute dissection to explain your sudden severe head

## (undated) NOTE — ED AVS SNAPSHOT
[facility] Emergency Department    [place]  [address] [address]    Phone:  [phone]    Fax:  [phone]           Isabella Auguste   MRN: [id number]    Department:  [facility] Emergency Department   Date of Visit:  6/ Si usted tiene algun problema con miranda sequimiento, por favor llame a nuestro adminstrador de jennifer al [phone]    Expect to receive an electronic request (by e-mail or text) to complete a self-assessment the day after your visit.   You may also receiv [facility] [address] (92 [facility]) Geetha [address] (900 [facility]) [address]  ([address] [facility] COMPARISON:  TY , CT ANGIOGRAPHY, CHEST (CPT=71275), 12/14/2015, 10:55. TY , CTA BRAIN + CTA CAROTIDS (MBA=[id number]/45374), 1/14/2016, 9:20.      INDICATIONS:  SOB, back pain, hx of PE     TECHNIQUE:  IV contrast-enhanced multislice CT angiography is Basilic, Cephalic, and the contralateral Subclavian and Jugular. FINDINGS:    EXTREMITY:  Left upper  THROMBI:  None visible.   COMPRESSION:  Normal compressibility, phasicity, and augmentation of the subclavian, jugular, axillary, brachial, basilic, an

## (undated) NOTE — ED AVS SNAPSHOT
Isabella Molina   MRN: [id number]    Department:  [facility] Emergency Department   Date of Visit:  2/28/2019           Disclosure     Insurance plans vary and the physician(s) referred by the ER may not be covered by your plan.  Please contact y tell this physician (or your personal doctor if your instructions are to return to your personal doctor) about any new or lasting problems. The primary care or specialist physician will see patients referred from the [facility] Emergency Department.  Iman Reyes

## (undated) NOTE — ED AVS SNAPSHOT
[facility] Emergency Department    [place]  [address] [address]    Phone:  [phone]    Fax:  [phone]           Nathalia Albarran   MRN: [id number]    Department:  [facility] Emergency Department   Date of Visit:  6/ IF THERE IS ANY CHANGE OR WORSENING OF YOUR CONDITION, CALL YOUR PRIMARY CARE PHYSICIAN AT ONCE OR RETURN IMMEDIATELY TO THE EMERGENCY DEPARTMENT.     If you have been prescribed any medication(s), please fill your prescription right away and begin taking t

## (undated) NOTE — LETTER
01/09/19        Dino Higuera 50      Dear Jaspreet Oconnell,    Our records indicate that you have outstanding lab work and or testing that was ordered for you and has not yet been completed:  Orders Placed This Encoun

## (undated) NOTE — LETTER
01/29/20        Deidra Cowart [phone]      Dear [facility],    Our records indicate that you have outstanding lab work and or testing that was ordered for you and has not yet been completed:  Orders Placed This E

## (undated) NOTE — MR AVS SNAPSHOT
[address]  [address] [address] [phone]               Thank you for choosing us for your health care visit with Shahnaz Perez DO.   We are glad to serve you and happy to provide you with this miranda Commonly known as:  XARELTO           zonisamide 100 MG Caps   Take 1 capsule (100 mg total) by mouth daily. Commonly known as:  ZONEGRAN           * Notice: This list has 2 medication(s) that are the same as other medications prescribed for you.  Read t